# Patient Record
Sex: FEMALE | Race: ASIAN | NOT HISPANIC OR LATINO | Employment: OTHER | URBAN - METROPOLITAN AREA
[De-identification: names, ages, dates, MRNs, and addresses within clinical notes are randomized per-mention and may not be internally consistent; named-entity substitution may affect disease eponyms.]

---

## 2017-01-17 ENCOUNTER — GENERIC CONVERSION - ENCOUNTER (OUTPATIENT)
Dept: OTHER | Facility: OTHER | Age: 77
End: 2017-01-17

## 2017-03-22 DIAGNOSIS — Z12.31 ENCOUNTER FOR SCREENING MAMMOGRAM FOR MALIGNANT NEOPLASM OF BREAST: ICD-10-CM

## 2017-04-18 ENCOUNTER — GENERIC CONVERSION - ENCOUNTER (OUTPATIENT)
Dept: OTHER | Facility: OTHER | Age: 77
End: 2017-04-18

## 2017-04-18 ENCOUNTER — HOSPITAL ENCOUNTER (OUTPATIENT)
Dept: RADIOLOGY | Age: 77
Discharge: HOME | End: 2017-04-18
Payer: COMMERCIAL

## 2017-04-18 DIAGNOSIS — Z12.31 ENCOUNTER FOR SCREENING MAMMOGRAM FOR MALIGNANT NEOPLASM OF BREAST: ICD-10-CM

## 2017-04-18 PROCEDURE — G0202 SCR MAMMO BI INCL CAD: HCPCS

## 2017-05-26 ENCOUNTER — ALLSCRIPTS OFFICE VISIT (OUTPATIENT)
Dept: OTHER | Facility: OTHER | Age: 77
End: 2017-05-26

## 2017-08-02 ENCOUNTER — APPOINTMENT (OUTPATIENT)
Dept: LAB | Facility: CLINIC | Age: 77
End: 2017-08-02
Payer: COMMERCIAL

## 2017-08-02 DIAGNOSIS — Z00.00 ENCOUNTER FOR GENERAL ADULT MEDICAL EXAMINATION WITHOUT ABNORMAL FINDINGS: ICD-10-CM

## 2017-08-02 DIAGNOSIS — I10 ESSENTIAL (PRIMARY) HYPERTENSION: ICD-10-CM

## 2017-08-02 LAB
ALBUMIN SERPL BCP-MCNC: 3.7 G/DL (ref 3.5–5)
ALP SERPL-CCNC: 89 U/L (ref 46–116)
ALT SERPL W P-5'-P-CCNC: 47 U/L (ref 12–78)
ANION GAP SERPL CALCULATED.3IONS-SCNC: 6 MMOL/L (ref 4–13)
AST SERPL W P-5'-P-CCNC: 29 U/L (ref 5–45)
BILIRUB SERPL-MCNC: 0.5 MG/DL (ref 0.2–1)
BUN SERPL-MCNC: 17 MG/DL (ref 5–25)
CALCIUM SERPL-MCNC: 8.4 MG/DL (ref 8.3–10.1)
CHLORIDE SERPL-SCNC: 104 MMOL/L (ref 100–108)
CHOLEST SERPL-MCNC: 184 MG/DL (ref 50–200)
CO2 SERPL-SCNC: 31 MMOL/L (ref 21–32)
CREAT SERPL-MCNC: 0.58 MG/DL (ref 0.6–1.3)
GFR SERPL CREATININE-BSD FRML MDRD: 89 ML/MIN/1.73SQ M
GLUCOSE P FAST SERPL-MCNC: 93 MG/DL (ref 65–99)
HDLC SERPL-MCNC: 96 MG/DL (ref 40–60)
LDLC SERPL CALC-MCNC: 72 MG/DL (ref 0–100)
POTASSIUM SERPL-SCNC: 3.7 MMOL/L (ref 3.5–5.3)
PROT SERPL-MCNC: 7.8 G/DL (ref 6.4–8.2)
SODIUM SERPL-SCNC: 141 MMOL/L (ref 136–145)
TRIGL SERPL-MCNC: 79 MG/DL

## 2017-08-02 PROCEDURE — 80061 LIPID PANEL: CPT

## 2017-08-02 PROCEDURE — 80053 COMPREHEN METABOLIC PANEL: CPT

## 2017-08-02 PROCEDURE — 36415 COLL VENOUS BLD VENIPUNCTURE: CPT

## 2017-08-04 ENCOUNTER — ALLSCRIPTS OFFICE VISIT (OUTPATIENT)
Dept: OTHER | Facility: OTHER | Age: 77
End: 2017-08-04

## 2017-08-18 ENCOUNTER — ALLSCRIPTS OFFICE VISIT (OUTPATIENT)
Dept: OTHER | Facility: OTHER | Age: 77
End: 2017-08-18

## 2017-10-17 ENCOUNTER — GENERIC CONVERSION - ENCOUNTER (OUTPATIENT)
Dept: OTHER | Facility: OTHER | Age: 77
End: 2017-10-17

## 2017-10-31 ENCOUNTER — GENERIC CONVERSION - ENCOUNTER (OUTPATIENT)
Dept: OTHER | Facility: OTHER | Age: 77
End: 2017-10-31

## 2018-01-10 NOTE — RESULT NOTES
Message   notify the patient mammogram no evidence of malignancy/negative, see OB/GYN for routine examination and follow-up as scheduled        Verified Results  * MAMMO SCREENING BILATERAL W CAD 12Apr2016 10:53AM Anisha Givens     Test Name Result Flag Reference   MAMMO SCREENING BILATERAL W CAD (Report)     Patient History:   Patient is postmenopausal, has history of colorectal cancer at    age 45, and had first child at age 43  Family history of breast cancer in sister at age 61 and    colorectal cancer in sister at age 79  Took estrogen for 20 years  Patient has never smoked  Patient's BMI is 23 4  Reason for exam: screening (asymptomatic)  Mammo Screening Bilateral W CAD: April 12, 2016 - Check In #:    [de-identified]   Bilateral CC and MLO view(s) were taken  Technologist: RT Xiao(R)(M)   Prior study comparison: April 8, 2015, bilateral digital    screening mammogram, performed at [place] [address]  April 2, 2014, digital bilateral screening mammogram    performed at [address] [place] [address]  March 28, 2013,    digital bilateral screening mammogram performed at [facility]  March 27, 2012, digital bilateral screening    mammogram performed at [facility]  March 22, 2011, digital bilateral screening mammogram performed at [address] [place] [address]  There are scattered fibroglandular densities  No new dominant    soft tissue mass, architectural distortion or suspicious    calcifications are noted  The skin and nipple contours are    within normal limits  No evidence of malignancy  No significant changes   when compared with prior studies  ASSESSMENT: BiRad:1 - Negative     Recommendation:   Routine screening mammogram of both breasts in 1 year  Analyzed by CAD     8-10% of cancers will be missed on mammography  Management of a    palpable abnormality must be based on clinical grounds   Patients will be notified of their results via letter from our facility  Accredited by [employer] of [employer] and FDA  Transcription Location: DEVI Jenkins 98: [id number]     Risk Value(s):   Tyrer-Cuzick 10 Year: 5 764%, Tyrer-Cuzick Lifetime: 5 764%,    Myriad Table: 1 5%, BRITTNEE 5 Year: 2 6%, NCI Lifetime: 5 9%   Signed by:   Radha Nesbitt MD   4/12/16       Signatures   Electronically signed by : Kadie Serrato DO;  Apr 12 2016  3:05PM EST                       (Author)

## 2018-01-11 NOTE — PROGRESS NOTES
Chief Complaint  The patient arrived for a blood pressure check  the patient did bring her BP log with her to her appointment  The blood pressure was 142/84, pulse 64  I reviewed the blood pressure and the log with Dr Galen Jaramillo and he requested the patient continue to monitor her BP and continue to modify her lifestyle  Active Problems    1  Abnormal blood chemistry (790 6) (R79 9)   2  Achrochordon (701 9) (L91 8)   3  Acute bronchitis (466 0) (J20 9)   4  Acute pharyngitis (462) (J02 9)   5  Acute upper respiratory infection (465 9) (J06 9)   6  Benign essential hypertension (401 1) (I10)   7  Diarrhea (787 91) (R19 7)   8  Encounter for screening for osteoporosis (V82 81) (Z13 820)   9  Encounter for screening mammogram for malignant neoplasm of breast (V76 12)   (Z12 31)   10  Hypokalemia (276 8) (E87 6)   11  Keratosis, seborrheic (702 19) (L82 1)   12  Lower back pain (724 2) (M54 5)   13  Medicare annual wellness visit, subsequent (V70 0) (Z00 00)   14  Need for chickenpox vaccination (V05 4) (Z23)   15  Need for vaccine for TD (tetanus-diphtheria) (V06 5) (Z23)   16  Screening for genitourinary condition (V81 6) (Z13 89)   17  Screening for neurological condition (V80 09) (Z13 89)   18  Sinus bradycardia (427 89) (R00 1)   19  Skin tag (701 9) (L91 8)   20  Urinary incontinence (788 30) (R32)   21  Vitamin A deficiency (264 9) (E50 9)   22  Vitamin D deficiency (268 9) (E55 9)    Current Meds   1  Calcium 500-125 MG-UNIT Oral Tablet; TAKE 1 TABLET DAILY; Therapy: 68VLC6887 to Recorded   2  Glucosamine CAPS; Patient takes 1200 mg- 1500 mg daily; Therapy: (Recorded:95Ryh0094) to Recorded   3  Kelp 150 MCG Oral Tablet; Therapy: (Cy Jury) to Recorded   4  Omega-3 Fish Oil 1200 MG Oral Capsule; Take 1 times daily; Therapy: (Recorded:20Rig7834) to Recorded   5  One-Daily Multi Vitamins TABS; TAKE 1 TABLET DAILY; Therapy: (Recorded:64Xut4340) to Recorded   6   Probiotic CAPS; USE AS DIRECTED; Therapy: (Recorded:77Yco7286) to Recorded   7  Restasis 0 05 % Ophthalmic Emulsion; INSTILL 1 DROP IN [facility] EYE TWICE DAILY; Therapy: 40YMD5585 to  Requested for: 81Sso2010 Recorded   8  Vitamin C 500 MG Oral Capsule; TAKE 1 CAPSULE DAILY; Therapy: (Recorded:69Vln3767) to Recorded   9  Vitamin C 500 MG Oral Capsule; TAKE 1 CAPSULE DAILY; Therapy: 68DTR0108 to Recorded   10  Vitamin D3 CAPS; Patient takes 2700 IU in the winter and 2300IU in the spring/summer; Therapy: (Recorded:89Mtd7256) to Recorded    Allergies    1  No Known Drug Allergies    2  No Known Environmental Allergies   3   No Known Food Allergies    Future Appointments    Date/Time Provider Specialty Site   08/10/2018 10:30 AM Champ Adams DO [facility]     Signatures   Electronically signed by : Bernie Singer DO; Aug 20 2017  9:39AM EST                       (Author)

## 2018-01-11 NOTE — PROGRESS NOTES
Chief Complaint  The patient arrived for a blood pressure check, she had her BP log with her  Initial BP was 142/64 Pulse 64  Active Problems    1  Abnormal blood chemistry (790 6) (R79 9)   2  Achrochordon (701 9) (L91 8)   3  Acute bronchitis (466 0) (J20 9)   4  Acute pharyngitis (462) (J02 9)   5  Acute upper respiratory infection (465 9) (J06 9)   6  Benign essential hypertension (401 1) (I10)   7  Diarrhea (787 91) (R19 7)   8  Encounter for screening for osteoporosis (V82 81) (Z13 820)   9  Encounter for screening mammogram for malignant neoplasm of breast (V76 12)   (Z12 31)   10  Hypokalemia (276 8) (E87 6)   11  Keratosis, seborrheic (702 19) (L82 1)   12  Lower back pain (724 2) (M54 5)   13  Medicare annual wellness visit, subsequent (V70 0) (Z00 00)   14  Need for chickenpox vaccination (V05 4) (Z23)   15  Need for vaccine for TD (tetanus-diphtheria) (V06 5) (Z23)   16  Screening for genitourinary condition (V81 6) (Z13 89)   17  Screening for neurological condition (V80 09) (Z13 89)   18  Sinus bradycardia (427 89) (R00 1)   19  Skin tag (701 9) (L91 8)   20  Urinary incontinence (788 30) (R32)   21  Vitamin A deficiency (264 9) (E50 9)   22  Vitamin D deficiency (268 9) (E55 9)    Current Meds   1  Calcium 500-125 MG-UNIT Oral Tablet; TAKE 1 TABLET DAILY; Therapy: 12ILK3527 to Recorded   2  Glucosamine CAPS; Patient takes 1200 mg- 1500 mg daily; Therapy: (Recorded:06Sxg7667) to Recorded   3  Kelp 150 MCG Oral Tablet; Therapy: (Alcus [place]) to Recorded   4  Omega-3 Fish Oil 1200 MG Oral Capsule; Take 1 times daily; Therapy: (Recorded:88Pzm7651) to Recorded   5  One-Daily Multi Vitamins TABS; TAKE 1 TABLET DAILY; Therapy: (Recorded:05Wag2647) to Recorded   6  Probiotic CAPS; USE AS DIRECTED; Therapy: (Recorded:14Jhc4281) to Recorded   7  Restasis 0 05 % Ophthalmic Emulsion; INSTILL 1 DROP IN [facility] EYE TWICE DAILY;    Therapy: 76URA2047 to  Requested for: 25Ukx8480 Recorded 8  Vitamin C 500 MG Oral Capsule; TAKE 1 CAPSULE DAILY; Therapy: (Recorded:95Jgm3338) to Recorded   9  Vitamin C 500 MG Oral Capsule; TAKE 1 CAPSULE DAILY; Therapy: 16ESL1027 to Recorded   10  Vitamin D3 CAPS; Patient takes 2700 IU in the winter and 2300IU in the spring/summer; Therapy: (Recorded:89Vrx5357) to Recorded    Allergies    1  No Known Drug Allergies    2  No Known Environmental Allergies   3   No Known Food Allergies    Future Appointments    Date/Time Provider Specialty Site   08/10/2018 10:30 AM Glynn Phillips DO [facility]     Signatures   Electronically signed by : Christiane Borrero DO; Aug 20 2017  9:37AM EST                       (Author)

## 2018-01-11 NOTE — RESULT NOTES
Message   Notify the patient the Holter monitor did show rare PACs  follow up as scheduled to discuss        Verified Results  HOLTER MONITOR - 24 HOUR [id number] 12:49PM Kalyan De Leon Order Number: [id number]    - Patient Instructions: To schedule this appointment, please contact Central Scheduling at [phone]  For Jose Partida, please call [phone]   Order Number: [id number]    - Patient Instructions: To schedule this appointment, please contact Central Scheduling at [phone]  For Jose Partida, please call [phone]  Test Name Result Flag Reference   HOLTER MONITOR - 24 HOUR (Report)     PATIENT NAME: Husam Walsh      AGE: 66 y o  Sex: female   MRN: [id number]       PROCEDURE: Holter monitor - 24 hour        INDICATIONS: bradycardia     HOLTER FINDINGS:     The patient was monitored for 24 continuous hours  This revealed the patient to be in normal sinus rhythm with an average rate of 59 BPM; a minimum rate of 44 BPM; and a maximum rate of 93 BPM       There were a total of 0 premature ventricular contractions  There were a total of 51 premature atrial contractions  There was no evidence of atrial fibrillation or atrial flutter  There was no evidence of heart block, and no significant pauses were    seen  No symptoms were reported  1  The patient was in Sinus rhythm throughout the duration of the study  2  The patients average heart rate was 59 bpm     3  Rare PACs   4  No arrhythmia were noted  5  No symptoms reported         Signatures   Electronically signed by : Km Gutierrez DO; Aug 18 2016  7:36PM EST                       (Author)

## 2018-01-12 NOTE — RESULT NOTES
Message   Notify the patient the DEXA scan does show osteopenia, compared to the prior study no significant change follow up as scheduled to discuss        Verified Results  * DXA BONE DENSITY [address] [id number] 02:31PM Darcie Cortez Order Number: [id number]    - Patient Instructions: To schedule this appointment, please contact Central Scheduling at [phone]   Order Number: [id number]    - Patient Instructions: To schedule this appointment, please contact Central Scheduling at [phone]  Test Name Result Flag Reference   DXA BONE DENSITY SPINE HIP AND PELVIS (Report)     CENTRAL DXA SCAN     CLINICAL HISTORY:  66year old postmenopausal  female with history of degenerative arthritis, colon cancer and kidney stones  The patient does not exercise and takes calcium and vitamin D supplements  TECHNIQUE: Bone densitometry was performed using a Hologic Horizon A bone densitometer  Regions of interest appear properly placed  There are degenerative changes of the lumbar spine, which can artificially elevate bone mineral density results  Mild    dextroscoliosis is present  COMPARISON: July 16, 2014 and before     RESULTS:    LUMBAR SPINE: L1-L4:   BMD 0 815 gm/cm2   T-score -2 1   Z-score 0 5     LEFT TOTAL HIP:   BMD 0 812 gm/cm2   T-score -1 1   Z-score 0 9     LEFT FEMORAL NECK:   BMD 0 639 gm/cm2   T-score -1 9   Z-score 0 3             IMPRESSION:   1  Based on the [facility] classification, the T-scores of -2 1, -1 9 and -1 1 in the lumbar spine, left femoral neck and total left hip are consistent with low bone mineral density  2  Since the prior study, there has been no significant change in BMD in the lumbar spine or left hip  3  The 10 year risk of hip fracture is 3 6 %, with the 10 year risk of major osteoporotic fracture being 14 %, as calculated by the WHO fracture risk assessment tool (FRAX)   The current NOF guidelines recommend treating patients with FRAX 10 year risk   score of >3% for hip fracture and >20% for major osteoporotic fracture  4  Any secondary causes of low bone mineral density should be excluded prior to treatment, if clinically indicated  5  A daily intake of at least 1200 mg calcium and 800 to 1000 IU of Vitamin D, as well as weight bearing and muscle strengthening exercise, fall prevention and avoidance of tobacco and excessive alcohol intake as basic preventive measures are suggested  6  Repeat DXA scan in 18-24 months as clinically indicated  WHO CLASSIFICATION:   Normal (a T-score of -1 0 or higher)   Low bone mineral density (a T-score of less than -1 0 but higher than -2 5)   Osteoporosis (a T-score of -2 5 or less)   Severe osteoporosis (a T-score of -2 5 or less with a fragility fracture)             Workstation performed: [id number]     Signed by:   Juli Farrell MD   8/17/16       Plan  Medicare annual wellness visit, subsequent    · * Dexa Scan Axial Skel (Hip, Pelvis, Spine) ; every 2 years; Last 61UOS7597;  Next  73EIC6632; Status:Active    Signatures   Electronically signed by : Daniel Ospina DO; Aug 17 2016 10:27PM EST                       (Author)

## 2018-01-13 VITALS
HEIGHT: 59 IN | BODY MASS INDEX: 24.2 KG/M2 | OXYGEN SATURATION: 98 % | SYSTOLIC BLOOD PRESSURE: 144 MMHG | RESPIRATION RATE: 16 BRPM | HEART RATE: 58 BPM | WEIGHT: 120.04 LBS | DIASTOLIC BLOOD PRESSURE: 100 MMHG

## 2018-01-13 VITALS
TEMPERATURE: 98.7 F | HEIGHT: 59 IN | OXYGEN SATURATION: 96 % | HEART RATE: 57 BPM | DIASTOLIC BLOOD PRESSURE: 92 MMHG | RESPIRATION RATE: 16 BRPM | SYSTOLIC BLOOD PRESSURE: 133 MMHG

## 2018-01-13 NOTE — PROGRESS NOTES
Assessment    1  Medicare annual wellness visit, subsequent (V70 0) (Z00 00)   2  Former smoker (V15 82) (K79 269)   3  Skin tag (701 9) (L91 8)   4  Keratosis, seborrheic (702 19) (L82 1)   5  Benign essential hypertension (401 1) (I10)    Assessment and plan #1 Medicare annual wellness examination completed for the patient today-We'll be ordering patient's comprehensive metabolic panel, lipid panel and hemoglobin A1c  She is up-to-date on her mammogram and colonoscopy  Today she will receive the Prevnar 13  #2 skin tag, seborrheic keratosis I will have the patient see plastic surgeon Dr Mercedes Stewart #3 benign essential hypertension suboptimal control  The patient start monitoring her blood pressure on twice stay, I would like the patient to keep a log and come back for a blood pressure check in 2 weeks; the patient reports me that her home blood pressures are running fine  RTO one year, blood pressure check 2 weeks call if any problems  Plan  Abnormal blood chemistry, Benign essential hypertension    · (1) HEMOGLOBIN A1C; Status:Active; Requested MYU:[id number]; Benign essential hypertension    · (1) COMPREHENSIVE METABOLIC PANEL; Status:Active; Requested FUD:[id number];    · (1) LIPID PANEL, FASTING; Status:Active; Requested RSE:[id number];   Keratosis, seborrheic    · 1 - Britton Butterfield MD, Arabella Elena  (Plastic And Reconstructive Surgery) Co-Management  *   Status: Active  Requested for: 60SPA7032  Care Summary provided  : Yes  Screening for genitourinary condition    · *VB - Urinary Incontinence Screen (Dx Z13 89 Screen for UI);  Status:Complete;   Done:  43CFI0158 12:00AM    prevnar 13; bp check 2 weeks     Chief Complaint  Patient is here for a Medicare Wellness Exam       History of Present Illness  HPI: bp 120/80-90 at home; the pt reports increased leaking urine patient does have a seborrheic keratosis of the right shoulder region a irritated skin tag of the right neck and then a seborrheic keratosis Ã2 of the left flank   Welcome to Medicare and Wellness Visits: The patient is being seen for the subsequent annual wellness visit  Medicare Screening and Risk Factors   Hospitalizations: no previous hospitalizations  Medicare Screening Tests Risk Questions   Abdominal aortic aneurysm risk assessment: none indicated  Osteoporosis risk assessment:  descent  Drug and Alcohol Use: The patient is a former cigarette smoker and quit smoking 1976  The patient reports never drinking alcohol  She has never used illicit drugs  Diet and Physical Activity: Current diet includes well balanced meals, 2-3 servings of fruit per day, 3-4 servings of vegetables per day, 1 servings of meat per day, 1 servings of whole grains per day, 1 servings of dairy products per day and 3 cups of coffee per day  She exercises infrequently  Exercise: walking  (the pt is a grazer)   Mood Disorder and Cognitive Impairment Screening: She denies feeling down, depressed, or hopeless over the past two weeks  She denies feeling little interest or pleasure in doing things over the past two weeks  Cognitive impairment screening: denies difficulty learning/retaining new information, denies difficulty handling complex tasks, denies difficulty with reasoning, denies difficulty with spatial ability and orientation, denies difficulty with language and denies difficulty with behavior  Functional Ability/Level of Safety: Hearing is normal bilaterally  She does not use a hearing aid  Able to do activities of daily living without limitations:  Able to participate in social activities without limitations  Able to drive without limitations  Activities of daily living details: does not need help using the phone, no transportation help needed, does not need help shopping, no meal preparation help needed, does not need help doing housework, does not need help doing laundry, does not need help managing medications and does not need help managing money  Advance Directives: Advance directives: living will, but no durable power of  for health care directives and no advance directives  Co-Managers and Medical Equipment/Suppliers: See Patient Care Team   Preventive Quality Program 65 and Older: Falls Risk: The patient fell 0 times in the past 12 months  Symptoms Include: The patient is currently experiencing fall symptoms  Patient Care Team    Care Team Member Role Specialty Office Number   Gabriela Knapp DO Specialist Cardiology [phone]   Chiamaya Danny DO Specialist Obstetrics/Gynecology [phone]   Susan Macedo MD Specialist Ophthalmology [phone]   Linda Alejandro MD  Colon and Rectal Surgery [phone]     Active Problems    1  Abnormal blood chemistry (790 6) (R79 9)   2  Achrochordon (701 9) (L91 8)   3  Acute bronchitis (466 0) (J20 9)   4  Acute pharyngitis (462) (J02 9)   5  Acute upper respiratory infection (465 9) (J06 9)   6  Benign essential hypertension (401 1) (I10)   7  Diarrhea (787 91) (R19 7)   8  Encounter for screening for osteoporosis (V82 81) (Z13 820)   9  Encounter for screening mammogram for malignant neoplasm of breast (V76 12)   (Z12 31)   10  Hypokalemia (276 8) (E87 6)   11  Lower back pain (724 2) (M54 5)   12  Medicare annual wellness visit, subsequent (V70 0) (Z00 00)   13  Need for chickenpox vaccination (V05 4) (Z23)   14  Need for vaccine for TD (tetanus-diphtheria) (V06 5) (Z23)   15  Screening for genitourinary condition (V81 6) (Z13 89)   16  Screening for neurological condition (V80 09) (Z13 89)   17  Sinus bradycardia (427 89) (R00 1)   18  Vitamin A deficiency (264 9) (E50 9)   19  Vitamin D deficiency (268 9) (E55 9)    Past Medical History    · Need for chickenpox vaccination (V05 4) (Z23)   · Need for vaccine for TD (tetanus-diphtheria) (V06 5) (Z23)    The active problems and past medical history were reviewed and updated today        Surgical History    · History of  Section   · History of Colon Surgery   · History of Hysterectomy    The surgical history was reviewed and updated today  Family History  Family History    · Family history of Cancer   · Family history of Family Health Status Of Father -    · Family history of Family Health Status Of Mother -     The family history was reviewed and updated today  Social History    · Denied: Drug Use   · Never A Smoker   · Never Drank Alcohol  The social history was reviewed and updated today  The social history was reviewed and is unchanged  Current Meds   1  Calcium 500-125 MG-UNIT Oral Tablet; TAKE 1 TABLET DAILY; Therapy: 39IVA4980 to Recorded   2  Glucosamine CAPS; Patient takes 1200 mg- 1500 mg daily; Therapy: (Recorded:98Qxv5089) to Recorded   3  Kelp 150 MCG Oral Tablet; Therapy: (Danny Ma) to Recorded   4  Omega-3 Fish Oil 1200 MG Oral Capsule; Take 1 times daily; Therapy: (Recorded:19Ueq4039) to Recorded   5  One-Daily Multi Vitamins TABS; TAKE 1 TABLET DAILY; Therapy: (Recorded:33Mea1057) to Recorded   6  Probiotic CAPS; USE AS DIRECTED; Therapy: (Recorded:32Yqw2625) to Recorded   7  Restasis 0 05 % Ophthalmic Emulsion; INSTILL 1 DROP IN [facility] EYE TWICE DAILY; Therapy: 09RYH4561 to  Requested for: 52Daz4203 Recorded   8  Vitamin C 500 MG Oral Capsule; TAKE 1 CAPSULE DAILY; Therapy: (Recorded:19Zxi1381) to Recorded   9  Vitamin C 500 MG Oral Capsule; TAKE 1 CAPSULE DAILY; Therapy: 22SJO6191 to Recorded   10  Vitamin D3 CAPS; Patient takes 2700 IU in the winter and 2300IU in the spring/summer; Therapy: (Recorded:94Ucy8115) to Recorded    The medication list was reviewed and updated today  Allergies    1  No Known Drug Allergies    2  No Known Environmental Allergies   3   No Known Food Allergies    Immunizations   1    Influenza  08-Oct-2016    PPSV  2010    Td/DT  2013    Zoster  2014     Vitals  Signs    Heart Rate: 58  Respiration: 16  Systolic: 615, LUE, Sitting  Diastolic: 585, LUE, Sitting  Height: 4 ft 11 in  Weight: 120 lb 0 6 oz  BMI Calculated: 24 24  BSA Calculated: 1 48  O2 Saturation: 98    Physical Exam  Right neck skin tag, right shoulder seborrheic keratosis     Constitutional   General appearance: No acute distress, well appearing and well nourished  Head and Face   Head and face: Normal     Eyes   Conjunctiva and lids: No swelling, erythema or discharge  Pupils and irises: Equal, round, reactive to light  Ears, Nose, Mouth, and Throat   External inspection of ears and nose: Normal     Otoscopic examination: Tympanic membranes translucent with normal light reflex  Canals patent without erythema  Hearing: Normal     Nasal mucosa, septum, and turbinates: Normal without edema or erythema  Lips, teeth, and gums: Normal, good dentition  Oropharynx: Normal with no erythema, edema, exudate or lesions  Neck   Neck: Supple, symmetric, trachea midline, no masses  Pulmonary   Respiratory effort: No increased work of breathing or signs of respiratory distress  Auscultation of lungs: Clear to auscultation  Cardiovascular   Auscultation of heart: Normal rate and rhythm, normal S1 and S2, no murmurs  Pedal pulses: 2+ bilaterally  Examination of extremities for edema and/or varicosities: Normal     Lymphatic   Palpation of lymph nodes in neck: No lymphadenopathy  Psychiatric   Mood and affect: Normal        Results/Data  PHQ-9 Adult Depression Screening 74Lvn2483 10:55AM User, [facility]     Test Name Result Flag Reference   PHQ-9 Adult Depression Score 0     Over the last two weeks, how often have you been bothered by any of the following problems?   Little interest or pleasure in doing things: Not at all - 0  Feeling down, depressed, or hopeless: Not at all - 0  Trouble falling or staying asleep, or sleeping too much: Not at all - 0  Feeling tired or having little energy: Not at all - 0  Poor appetite or over eating: Not at all - 0  Feeling bad about yourself - or that you are a failure or have let yourself or your family down: Not at all - 0  Trouble concentrating on things, such as reading the newspaper or watching television: Not at all - 0  Moving or speaking so slowly that other people could have noticed  Or the opposite -  being so fidgety or restless that you have been moving around a lot more than usual: Not at all - 0  Thoughts that you would be better off dead, or of hurting yourself in some way: Not at all - 0   PHQ-9 Adult Depression Screening Negative     PHQ-9 Difficulty Level Not difficult at all     PHQ-9 Severity No Depression       Falls Risk Assessment (Dx Z13 89 Screen for Neurologic Disorder) 94FTH7170 10:55AM User, [facility]     Test Name Result Flag Reference   Falls Risk      No falls in the past year     (1) COMPREHENSIVE METABOLIC PANEL 94KON0012 33:84LP Brooklynn Shy GILL Order Number: [id number]     Test Name Result Flag Reference   SODIUM 141 mmol/L  136-145   POTASSIUM 3 7 mmol/L  3 5-5 3   CHLORIDE 104 mmol/L  100-108   CARBON DIOXIDE 31 mmol/L  21-32   ANION GAP (CALC) 6 mmol/L  4-13   BLOOD UREA NITROGEN 17 mg/dL  5-25   CREATININE 0 58 mg/dL L 0 60-1 30   Standardized to IDMS reference method   CALCIUM 8 4 mg/dL  8 3-10 1   BILI, TOTAL 0 50 mg/dL  0 20-1 00   ALK PHOSPHATAS 89 U/L     ALT (SGPT) 47 U/L  12-78   AST(SGOT) 29 U/L  5-45   ALBUMIN 3 7 g/dL  3 5-5 0   TOTAL PROTEIN 7 8 g/dL  6 4-8 2   eGFR 89 ml/min/1 73sq m     National Kidney Disease Education Program recommendations are as follows:  GFR calculation is accurate only with a steady state creatinine  Chronic Kidney disease less than 60 ml/min/1 73 sq  meters  Kidney failure less than 15 ml/min/1 73 sq  meters     GLUCOSE FASTING 93 mg/dL  65-99     (1) LIPID PANEL, FASTING 92Lvh7468 12:07PM Jeremy Hinojosa Order Number: [id number]     Test Name Result Flag Reference CHOLESTEROL 184 mg/dL     HDL,DIRECT 96 mg/dL H 40-60   Specimen collection should occur prior to Metamizole administration due to the potential for falsely depressed results  LDL CHOLESTEROL CALCULATED 72 mg/dL  0-100   - Patient Instructions: This is a fasting blood test  Water,black tea or black  coffee only after 9:00pm the night before test   Drink 2 glasses of water the morning of test       Triglyceride:         Normal              <150 mg/dl       Borderline High    150-199 mg/dl       High               200-499 mg/dl       Very High          >499 mg/dl  Cholesterol:         Desirable        <200 mg/dl      Borderline High  200-239 mg/dl      High             >239 mg/dl  HDL Cholesterol:        High    >59 mg/dL      Low     <41 mg/dL  LDL CALCULATED:    This screening LDL is a calculated result  It does not have the accuracy of the Direct Measured LDL in the monitoring of patients with hyperlipidemia and/or statin therapy  Direct Measure LDL (UEW029) must be ordered separately in these patients  TRIGLYCERIDES 79 mg/dL  <=150   Specimen collection should occur prior to N-Acetylcysteine or Metamizole administration due to the potential for falsely depressed results  * MAMMO SCREENING BILATERAL W CAD 18Apr2017 10:54AM Lyanne Phalen Order Number: [id number]    - Patient Instructions: To schedule this appointment, please contact Central Scheduling at [phone]  Do not wear any perfume, powder, lotion or deodorant on breast or underarm area  Please bring your doctors order, referral (if needed) and insurance information with you on the day of the test  Failure to bring this information may result in this test being rescheduled  Arrive 15 minutes prior to your appointment time to register  On the day of your test, please bring any prior mammogram or breast studies with you that were not performed at a [facility]   Failure to bring prior exams may result in your test needing to be rescheduled  Test Name Result Flag Reference   MAMMO SCREENING BILATERAL W CAD (Report)     Patient History:   Patient is postmenopausal, has history of colorectal cancer at    age 45, and had first child at age 43  Family history of breast cancer at age 61 and colorectal cancer    at age 79 in sister  Took estrogen for 20 years  Patient has never smoked  Patient's BMI is 23 4  Reason for exam: screening, asymptomatic  Mammo Screening Bilateral W CAD: April 18, 2017 - Check In #:    [de-identified]   Bilateral CC and MLO view(s) were taken  Technologist: Ramesh Gipson RT(R)(M)   Prior study comparison: April 12, 2016, mammo screening bilateral   W CAD performed at [address] [place] [address]  April 8, 2015,    bilateral digital screening mammogram, performed at [place] [address]  April 2, 2014, digital bilateral    screening mammogram performed at [address] [place] [address]  March 28, 2013, digital bilateral screening mammogram performed    at [address] [place] [address]  March 27, 2012, digital    bilateral screening mammogram performed at /[facility]  There are scattered fibroglandular densities  The parenchymal pattern appears stable  No dominant soft tissue    mass or suspicious calcifications are noted  The skin and nipple   contours are within normal limits  No mammographic evidence of malignancy  No    significant changes when compared with prior studies  ACR BI-RADSï¾® Assessments: BiRad:1 - Negative     Recommendation:   Routine screening mammogram in 1 year  A reminder letter will be   scheduled  Analyzed by CAD     8-10% of cancers will be missed on mammography  Management of a    palpable abnormality must be based on clinical grounds  Patients   will be notified of their results via letter from our facility  Accredited by [employer] of Radiology and FDA       Transcription Location: [facility]   Signing Station: [id number]     Risk Value(s):   Tyrer-Cuzick 10 Year: 5 100%, Tyrer-Cuzick Lifetime: 5 100%,    Myriad Table: 1 5%, BRITTNEE 5 Year: 3 5%, NCI Lifetime: 6 2%   Signed by:   Germaine Cruz MD   4/18/17     Health Management  Medicare annual wellness visit, subsequent   * Dexa Scan Axial Skel (Hip, Pelvis, Spine); every 2 years; Last 05ABH0225; Next Due:  48SCM9562; Overdue  Health Maintenance   Medicare Annual Wellness Visit; every 1 year; Next Due: 01AKG9776;  Overdue    Future Appointments    Date/Time Provider Specialty Site   08/18/2017 10:30 AM ABHIJIT, Nurse Schedule  [facility]   08/10/2018 10:30 AM Delmi Love DO Internal Medicine [facility]     Signatures   Electronically signed by : Kelton Perdue DO; Aug  6 2017  4:12PM EST                       (Author)

## 2018-01-13 NOTE — PROGRESS NOTES
Assessment    1  Medicare annual wellness visit, subsequent (V70 0) (Z00 00)   2  Sinus bradycardia (427 89) (R00 1)   3  Encounter for screening for osteoporosis (V82 81) (Z13 820)    Assessment and plan #1 annual Medicare wellness examination was completed for the patient overall the patient is clinically stable and doing very well we encouraged her to continue with healthy and balanced diet encourage her to exercise i e  walk 30 minutes daily  I will be ordering the patient's routine laboratories in one year including the comprehensive metabolic panel, lipid panel  #2 the patient has noticed some sinus bradycardia when at home in the 40s and because of this we will check a 24-hour Holter monitor to evaluate this further if she does have significant bradycardia she will need to see cardiology #3 I have also given the patient her order requisition for screening for osteoporosis  Given her old request for the DEXA scan  We encouraged the patient to get a flu vaccine this coming fall she does mention some international travel RTO in one year call if any problems  Plan  Benign essential hypertension, Medicare annual wellness visit, subsequent    · (1) LIPID PANEL, FASTING; Status:Active; Requested for:[id number];   Encounter for screening for osteoporosis    · * DXA BONE DENSITY SPINE HIP AND PELVIS; Status:Hold For - Scheduling;  Requested for:[id number];   Medicare annual wellness visit, subsequent    · (1) COMPREHENSIVE METABOLIC PANEL; Status:Active; Requested for:[id number];   Sinus bradycardia    · HOLTER MONITOR - 24 HOUR; Status:Hold For - Scheduling; Requested  for:[id number];     Discussion/Summary  Impression: Subsequent Annual Wellness Visit  Cardiovascular screening and counseling: counseling was given on maintaining a healthy diet and counseling was given on maintaining a healthy weight     Diabetes screening and counseling: counseling was given on maintaining a healthy diet and counseling was given on maintaining a healthy weight  Colorectal cancer screening and counseling: screening is current  Breast cancer screening and counseling: the risks and benefits of screening were discussed and screening is current  Cervical cancer screening and counseling: screening not indicated  Osteoporosis screening and counseling: the risks and benefits of screening were discussed  Abdominal aortic aneurysm screening and counseling: screening not indicated  HIV screening and counseling: screening not indicated  Chief Complaint  Patient presents to office today for a Medicare Wellness  History of Present Illness  HPI: the pt reports slight urine leaking she is working with ob/gyn   Welcome to [employer] and Wellness Visits: The patient is being seen for the subsequent annual wellness visit  Medicare Screening and Risk Factors   Hospitalizations: no previous hospitalizations  Medicare Screening Tests Risk Questions   Osteoporosis risk assessment: female gender, over 48years of age and 200 dexa  HIV risk assessment: none indicated  Drug and Alcohol Use: The patient has never smoked cigarettes  The patient reports never drinking alcohol  She has never used illicit drugs  Diet and Physical Activity: Current diet includes well balanced meals and low salt food choices  She exercises infrequently  Exercise: very active  Mood Disorder and Cognitive Impairment Screening: She denies feeling down, depressed, or hopeless over the past two weeks  She denies feeling little interest or pleasure in doing things over the past two weeks  Cognitive impairment screening: denies difficulty handling complex tasks, denies difficulty with language and denies difficulty with behavior  Functional Ability/Level of Safety: Hearing is normal bilaterally and a hearing aid is not used   Activities of daily living details: does not need help using the phone, no transportation help needed, does not need help shopping, no meal preparation help needed, does not need help doing housework, does not need help doing laundry, does not need help managing medications and does not need help managing money  Fall risk factors: The patient fell 0 times in the past 12 months  Home safety risk factors:  no unfamiliar surroundings, no loose rugs, no poor household lighting, no uneven floors, no household clutter, grab bars in the bathroom and handrails on the stairs  Advance Directives: Advance directives: living will, durable power of  for health care directives, advance directives and aravind Garay  Co-Managers and Medical Equipment/Suppliers: See Patient Care Team      Patient Care Team    Care Team Member Role Specialty Office Number   Christine Lockwood DO Specialist Cardiology [phone]   Wing Ayers DO Specialist Obstetrics/Gynecology [phone]   Chilango Ortiz MD Specialist Ophthalmology [phone]   Safia Hines MD  Colon and Rectal Surgery [phone]     Active Problems    1  Abnormal blood chemistry (790 6) (R79 9)   2  Achrochordon (701 9) (L91 8)   3  Acute bronchitis (466 0) (J20 9)   4  Acute pharyngitis (462) (J02 9)   5  Acute upper respiratory infection (465 9) (J06 9)   6  Benign essential hypertension (401 1) (I10)   7  Diarrhea (787 91) (R19 7)   8  Encounter for screening mammogram for malignant neoplasm of breast (V76 12)   (Z12 31)   9  Hypokalemia (276 8) (E87 6)   10  Lower back pain (724 2) (M54 5)   11  Medicare annual wellness visit, subsequent (V70 0) (Z00 00)   12  Need for chickenpox vaccination (V05 4) (Z23)   13  Need for vaccine for TD (tetanus-diphtheria) (V06 5) (Z23)   14  Screening for genitourinary condition (V81 6) (Z13 89)   15  Screening for neurological condition (V80 09) (Z13 89)   16  Vitamin A deficiency (264 9) (E50 9)   17   Vitamin D deficiency (268 9) (E55 9)    Past Medical History    · Need for chickenpox vaccination (V05 4) (Z23)   · Need for vaccine for TD (tetanus-diphtheria) (V06 5) (Z23)    The active problems and past medical history were reviewed and updated today  Surgical History    · History of  Section   · History of Colon Surgery   · History of Hysterectomy    The surgical history was reviewed and updated today  Family History  Family History    · Family history of Cancer   · Family history of Family Health Status Of Father -    · Family history of Family Health Status Of Mother -     The family history was reviewed and updated today  Social History    · Denied: Drug Use   · Never A Smoker   · Never Drank Alcohol  The social history was reviewed and updated today  The social history was reviewed and is unchanged  Current Meds   1  Calcium 600 TABS; TAKE 1 TABLET DAILY; Therapy: [phone]) to Recorded   2  Omega-3 Fish Oil 1000 MG Oral Capsule; TAKE 4 CAPSULE Daily; Therapy: [phone]) to Recorded   3  One-Daily Multi Vitamins TABS; TAKE 1 TABLET DAILY; Therapy: [phone]) to Recorded   4  Restasis 0 05 % Ophthalmic Emulsion; INSTILL 3 TIMES DAILY; Therapy: [id number] to ([id number])  Requested for: 20LEF1675 Recorded   5  Tears Again Hydrate 1000-500-40 MG CAPS; Therapy: 88MGP9310 to Recorded   6  Vitamin C 500 MG Oral Capsule; TAKE 1 CAPSULE DAILY; Therapy: [phone]) to Recorded    The medication list was reviewed and updated today  Allergies    1  No Known Drug Allergies    2  No Known Environmental Allergies   3  No Known Food Allergies    Immunizations   1    PPSV  2010    Td/DT  2013    Zoster  2014     Physical Exam    Constitutional   General appearance: No acute distress, well appearing and well nourished  Head and Face   Head and face: Normal     Eyes   Conjunctiva and lids: No swelling, erythema or discharge  Pupils and irises: Equal, round, reactive to light      Ears, Nose, Mouth, and Throat External inspection of ears and nose: Normal     Otoscopic examination: Tympanic membranes translucent with normal light reflex  Canals patent without erythema  Hearing: Normal     Nasal mucosa, septum, and turbinates: Normal without edema or erythema  Lips, teeth, and gums: Normal, good dentition  Oropharynx: Normal with no erythema, edema, exudate or lesions  Neck   Neck: Supple, symmetric, trachea midline, no masses  Pulmonary   Respiratory effort: No increased work of breathing or signs of respiratory distress  Auscultation of lungs: Clear to auscultation  Cardiovascular   Auscultation of heart: Normal rate and rhythm, normal S1 and S2, no murmurs  Pedal pulses: 2+ bilaterally  Examination of extremities for edema and/or varicosities: Normal     Lymphatic   Palpation of lymph nodes in neck: No lymphadenopathy  Psychiatric   Mood and affect: Normal        Health Management  Medicare annual wellness visit, subsequent   * Dexa Scan Axial Skel (Hip, Pelvis, Spine); every 2 years; Last [id number]; Next Due:  30QNF1709; Overdue  Health Maintenance   Medicare Annual Wellness Visit; every 1 year; Next Due: 05LAE5923;  Overdue    Signatures   Electronically signed by : Cherelle Ivey DO; Aug  2 2016 11:56AM EST                       (Author)

## 2018-01-18 NOTE — RESULT NOTES
Message   Notify the patient the mammogram -no mammographic evidence of malignancy, please have patient see OB/GYN for her routine examination and follow up as scheduled        Verified Results  * [facility] SCREENING BILATERAL W CAD 18Apr2017 10:54AM Edmond Teague Order Number: [id number]    - Patient Instructions: To schedule this appointment, please contact Central Scheduling at [phone]  Do not wear any perfume, powder, lotion or deodorant on breast or underarm area  Please bring your doctors order, referral (if needed) and insurance information with you on the day of the test  Failure to bring this information may result in this test being rescheduled  Arrive 15 minutes prior to your appointment time to register  On the day of your test, please bring any prior mammogram or breast studies with you that were not performed at a [facility]  Failure to bring prior exams may result in your test needing to be rescheduled  Test Name Result Flag Reference   MAMMO SCREENING BILATERAL W CAD (Report)     Patient History:   Patient is postmenopausal, has history of colorectal cancer at    age 45, and had first child at age 43  Family history of breast cancer at age 61 and colorectal cancer    at age 79 in sister  Took estrogen for 20 years  Patient has never smoked  Patient's BMI is 23 4  Reason for exam: screening, asymptomatic  Mammo Screening Bilateral W CAD: April 18, 2017 - Check In #:    [de-identified]   Bilateral CC and MLO view(s) were taken  Technologist: RT Miriam(R)(M)   Prior study comparison: April 12, 2016, mammo screening bilateral   W CAD performed at [address] [place] [address]  April 8, 2015,    bilateral digital screening mammogram, performed at [address] [place] [address]  April 2, 2014, digital bilateral    screening mammogram performed at [address] [place] [address]      March 28, 2013, digital bilateral screening mammogram performed at [address]  March 27, 2012, digital    bilateral screening mammogram performed at [address]  There are scattered fibroglandular densities  The parenchymal pattern appears stable  No dominant soft tissue    mass or suspicious calcifications are noted  The skin and nipple   contours are within normal limits  No mammographic evidence of malignancy  No    significant changes when compared with prior studies  ACR BI-RADSï¾® Assessments: BiRad:1 - Negative     Recommendation:   Routine screening mammogram in 1 year  A reminder letter will be   scheduled  Analyzed by CAD     8-10% of cancers will be missed on mammography  Management of a    palpable abnormality must be based on clinical grounds  Patients   will be notified of their results via letter from our facility  Accredited by [employer] of Radiology and FDA  Transcription Location: [facility] 98: [id number]     Risk Value(s):   Tyrer-Cuzick 10 Year: 5 100%, Tyrer-Cuzick Lifetime: 5 100%,    Myriad Table: 1 5%, BRITTNEE 5 Year: 3 5%, NCI Lifetime: 6 2%   Signed by:   Kiara Lara MD   4/18/17       Signatures   Electronically signed by : Cristo Mercado DO;  Apr 18 2017  6:06PM EST                       (Author)

## 2018-03-26 ENCOUNTER — TELEPHONE (OUTPATIENT)
Dept: INTERNAL MEDICINE CLINIC | Facility: CLINIC | Age: 78
End: 2018-03-26

## 2018-03-26 DIAGNOSIS — Z12.31 ENCOUNTER FOR SCREENING MAMMOGRAM FOR BREAST CANCER: Primary | ICD-10-CM

## 2018-04-18 ENCOUNTER — ANNUAL EXAM (OUTPATIENT)
Dept: OBGYN CLINIC | Facility: CLINIC | Age: 78
End: 2018-04-18
Payer: COMMERCIAL

## 2018-04-18 VITALS
DIASTOLIC BLOOD PRESSURE: 94 MMHG | SYSTOLIC BLOOD PRESSURE: 136 MMHG | BODY MASS INDEX: 25.13 KG/M2 | WEIGHT: 128 LBS | HEIGHT: 60 IN

## 2018-04-18 DIAGNOSIS — Z01.419 ENCOUNTER FOR ANNUAL ROUTINE GYNECOLOGICAL EXAMINATION: Primary | ICD-10-CM

## 2018-04-18 PROCEDURE — G0101 CA SCREEN;PELVIC/BREAST EXAM: HCPCS | Performed by: OBSTETRICS & GYNECOLOGY

## 2018-04-18 RX ORDER — KELP 150 MCG
TABLET ORAL
COMMUNITY

## 2018-04-18 RX ORDER — CYCLOSPORINE 0.5 MG/ML
EMULSION OPHTHALMIC
COMMUNITY
Start: 2018-02-21

## 2018-04-18 RX ORDER — PYRIDOXINE HCL (VITAMIN B6) 100 MG
1 TABLET ORAL DAILY
COMMUNITY
Start: 2017-08-04

## 2018-04-18 RX ORDER — BIOTIN 1 MG
TABLET ORAL
COMMUNITY

## 2018-04-18 RX ORDER — FOLIC ACID/MULTIVIT,IRON,MINER 0.4MG-18MG
TABLET ORAL
COMMUNITY

## 2018-04-18 RX ORDER — HYDROCORTISONE ACETATE 0.5 %
CREAM (GRAM) TOPICAL
COMMUNITY

## 2018-04-18 RX ORDER — MULTIVIT-MIN/IRON/FOLIC ACID/K 18-600-40
1 CAPSULE ORAL DAILY
COMMUNITY
End: 2021-06-28

## 2018-04-18 RX ORDER — MULTIVITAMIN
1 TABLET ORAL DAILY
COMMUNITY

## 2018-04-18 NOTE — PROGRESS NOTES
Assessment/Plan:    Pap smear was deferred due to low risk status  Encouraged self-breast examination as well as calcium supplementation  Continue annual mammogram   She is scheduled for screening colonoscopy this month  She will follow up with her family physician as scheduled  Return to office in 2 years per Medicare recommendations/    No problem-specific Assessment & Plan notes found for this encounter  Diagnoses and all orders for this visit:    Encounter for annual routine gynecological examination    Other orders  -     Calcium 500-125 MG-UNIT TABS; Take 1 tablet by mouth daily  -     RESTASIS 0 05 % ophthalmic emulsion;   -     Glucosamine-Chondroit-Vit C-Mn (GLUCOSAMINE 1500 COMPLEX) CAPS; Take by mouth  -     Kelp 150 MCG TABS; Take by mouth  -     Omega-3 Fatty Acids (OMEGA-3 FISH OIL) 1200 MG CAPS; Take by mouth  -     Multiple Vitamin (MULTIVITAMIN) tablet; Take 1 tablet by mouth daily  -     Probiotic Product (PROBIOTIC-10) CAPS; Take by mouth  -     Ascorbic Acid (VITAMIN C) 500 MG CAPS; Take 1 capsule by mouth daily  -     Cholecalciferol (VITAMIN D3) 1000 units CAPS; Take by mouth          Subjective:      Patient ID: Jj Buckner is a 76 y o  female  HPI     This is a 68-year-old female  ( x1) presents for her annual gyn exam   Patient went through iatrogenic menopause at age 34 (TAHBSO due to fibroid uterus)  She was on hormone replacement therapy for 3 years thereafter  Patient denies any vaginal bleeding or spotting  She denies any changes in bowel habits  She has had episodes of urinary incontinence associated with urgency  She prefers to manage this conservatively  She does follow up with her family physician on a regular basis  Overall patient is very healthy and active  She is  x7 years      The following portions of the patient's history were reviewed and updated as appropriate: allergies, current medications, past family history, past medical history, past social history, past surgical history and problem list     Review of Systems   Constitutional: Negative for fatigue, fever and unexpected weight change  Respiratory: Negative for cough, chest tightness, shortness of breath and wheezing  Cardiovascular: Negative  Negative for chest pain and palpitations  Gastrointestinal: Negative  Negative for abdominal distention, abdominal pain, blood in stool, constipation, diarrhea, nausea and vomiting  Genitourinary: Positive for frequency  Negative for difficulty urinating, dyspareunia, dysuria, flank pain, genital sores, hematuria, pelvic pain, urgency, vaginal bleeding, vaginal discharge and vaginal pain  Skin: Negative for rash  Objective:      /94   Ht 5' (1 524 m)   Wt 58 1 kg (128 lb)   Breastfeeding? No   BMI 25 00 kg/m²          Physical Exam   Constitutional: She appears well-developed and well-nourished  Cardiovascular: Normal rate and regular rhythm  Pulmonary/Chest: Effort normal and breath sounds normal    Abdominal: Soft  Bowel sounds are normal  She exhibits no distension  There is no tenderness  There is no rebound and no guarding  Genitourinary: Vagina normal  No vaginal discharge found  Genitourinary Comments: Cervix is surgically absent  The cuff is well-supported  The vagina is noted to be atrophic  No pelvic masses are appreciated    Rectovaginal exam is confirmatory

## 2018-04-23 ENCOUNTER — TRANSCRIBE ORDERS (OUTPATIENT)
Dept: RADIOLOGY | Facility: CLINIC | Age: 78
End: 2018-04-23

## 2018-04-24 ENCOUNTER — HOSPITAL ENCOUNTER (OUTPATIENT)
Dept: RADIOLOGY | Age: 78
Discharge: HOME | End: 2018-04-24
Payer: COMMERCIAL

## 2018-04-24 DIAGNOSIS — Z12.31 ENCOUNTER FOR SCREENING MAMMOGRAM FOR BREAST CANCER: ICD-10-CM

## 2018-04-24 PROCEDURE — 77067 SCR MAMMO BI INCL CAD: CPT

## 2018-08-04 DIAGNOSIS — R79.9 ABNORMAL FINDING OF BLOOD CHEMISTRY: ICD-10-CM

## 2018-08-04 DIAGNOSIS — I10 ESSENTIAL (PRIMARY) HYPERTENSION: ICD-10-CM

## 2018-08-06 ENCOUNTER — APPOINTMENT (OUTPATIENT)
Dept: LAB | Facility: CLINIC | Age: 78
End: 2018-08-06
Payer: COMMERCIAL

## 2018-08-06 DIAGNOSIS — I10 ESSENTIAL (PRIMARY) HYPERTENSION: ICD-10-CM

## 2018-08-06 DIAGNOSIS — I10 HYPERTENSION, UNSPECIFIED TYPE: Primary | ICD-10-CM

## 2018-08-06 DIAGNOSIS — I10 HYPERTENSION, UNSPECIFIED TYPE: ICD-10-CM

## 2018-08-06 DIAGNOSIS — R79.9 ABNORMAL FINDING OF BLOOD CHEMISTRY: ICD-10-CM

## 2018-08-06 LAB
ALBUMIN SERPL BCP-MCNC: 3.5 G/DL (ref 3.5–5)
ALP SERPL-CCNC: 87 U/L (ref 46–116)
ALT SERPL W P-5'-P-CCNC: 54 U/L (ref 12–78)
ANION GAP SERPL CALCULATED.3IONS-SCNC: 7 MMOL/L (ref 4–13)
AST SERPL W P-5'-P-CCNC: 34 U/L (ref 5–45)
BILIRUB SERPL-MCNC: 0.5 MG/DL (ref 0.2–1)
BUN SERPL-MCNC: 15 MG/DL (ref 5–25)
CALCIUM SERPL-MCNC: 8.6 MG/DL (ref 8.3–10.1)
CHLORIDE SERPL-SCNC: 103 MMOL/L (ref 100–108)
CHOLEST SERPL-MCNC: 194 MG/DL (ref 50–200)
CO2 SERPL-SCNC: 30 MMOL/L (ref 21–32)
CREAT SERPL-MCNC: 0.67 MG/DL (ref 0.6–1.3)
EST. AVERAGE GLUCOSE BLD GHB EST-MCNC: 117 MG/DL
GFR SERPL CREATININE-BSD FRML MDRD: 84 ML/MIN/1.73SQ M
GLUCOSE P FAST SERPL-MCNC: 93 MG/DL (ref 65–99)
HBA1C MFR BLD: 5.7 % (ref 4.2–6.3)
HDLC SERPL-MCNC: 103 MG/DL (ref 40–60)
LDLC SERPL CALC-MCNC: 77 MG/DL (ref 0–100)
NONHDLC SERPL-MCNC: 91 MG/DL
POTASSIUM SERPL-SCNC: 3.9 MMOL/L (ref 3.5–5.3)
PROT SERPL-MCNC: 7.4 G/DL (ref 6.4–8.2)
SODIUM SERPL-SCNC: 140 MMOL/L (ref 136–145)
TRIGL SERPL-MCNC: 71 MG/DL

## 2018-08-06 PROCEDURE — 36415 COLL VENOUS BLD VENIPUNCTURE: CPT

## 2018-08-06 PROCEDURE — 80061 LIPID PANEL: CPT

## 2018-08-06 PROCEDURE — 80053 COMPREHEN METABOLIC PANEL: CPT

## 2018-08-06 PROCEDURE — 83036 HEMOGLOBIN GLYCOSYLATED A1C: CPT

## 2018-08-10 ENCOUNTER — OFFICE VISIT (OUTPATIENT)
Dept: INTERNAL MEDICINE CLINIC | Facility: CLINIC | Age: 78
End: 2018-08-10
Payer: COMMERCIAL

## 2018-08-10 VITALS
WEIGHT: 125.2 LBS | RESPIRATION RATE: 16 BRPM | OXYGEN SATURATION: 97 % | BODY MASS INDEX: 24.58 KG/M2 | HEART RATE: 64 BPM | DIASTOLIC BLOOD PRESSURE: 80 MMHG | SYSTOLIC BLOOD PRESSURE: 118 MMHG | HEIGHT: 60 IN

## 2018-08-10 DIAGNOSIS — Z00.00 MEDICARE ANNUAL WELLNESS VISIT, SUBSEQUENT: ICD-10-CM

## 2018-08-10 DIAGNOSIS — R73.03 PREDIABETES: ICD-10-CM

## 2018-08-10 DIAGNOSIS — Z01.00 ENCOUNTER FOR VISION SCREENING: ICD-10-CM

## 2018-08-10 DIAGNOSIS — Z13.6 SCREENING FOR CARDIOVASCULAR CONDITION: ICD-10-CM

## 2018-08-10 DIAGNOSIS — Z13.820 SCREENING FOR OSTEOPOROSIS: Primary | ICD-10-CM

## 2018-08-10 PROCEDURE — G0439 PPPS, SUBSEQ VISIT: HCPCS | Performed by: INTERNAL MEDICINE

## 2018-08-10 PROCEDURE — 3008F BODY MASS INDEX DOCD: CPT | Performed by: INTERNAL MEDICINE

## 2018-08-10 PROCEDURE — 1101F PT FALLS ASSESS-DOCD LE1/YR: CPT | Performed by: INTERNAL MEDICINE

## 2018-08-10 NOTE — PROGRESS NOTES
Assessment/Plan:           Problem List Items Addressed This Visit        Other    Medicare annual wellness visit, subsequent     Assessment and plan 1  Medicare annual wellness examination overall the patient is clinically stable and doing well, we encouraged the patient to follow a healthy and balanced diet  We recommend that the patient exercise routinely approximately 30 minutes 5 times per week   We have reviewed the patient's vaccines and have made recommendations for updates if necessary flu vaccine in the fall I have counseled patient about the new shingles vaccine she may consider it I have counseled patient about the pneumonia shot she may consider it  We will be ordering screening laboratories which are age appropriate  Return to the office in 12 months call if any problems  Other Visit Diagnoses     Screening for osteoporosis    -  Primary    Relevant Orders    DXA bone density spine hip and pelvis    Screening for cardiovascular condition        Encounter for vision screening        Relevant Orders    Ambulatory referral to Ophthalmology    Prediabetes        Relevant Orders    Comprehensive metabolic panel    Hemoglobin A1C          Return to office 6  months  call if any problems  Subjective:      Patient ID: Caroline Sierra is a 76 y o  female      HPI   AWV Clinical     ISAR:   Previous hospitalizations?:  No       Once in a Lifetime Medicare Screening:   EKG performed?:  No    AAA screening performed? (if performed, please add date to Health Maintenance):  No       Medicare Screening Tests and Risk Assessment:   AAA Risk Assessment    Osteoporosis Risk Assessment    HIV Risk Assessment        Drug and Alcohol Use:   Tobacco use    Cigarettes:  former smoker    Quit date:  8/10/1976   Smokeless:  never used smokeless tobacco    Tobacco use duration    Years:  18    Packs per day:  1    Tobacco Cessation Readiness    Alcohol use    Alcohol use:  never    Alcohol Treatment Readiness Illicit Drug Use    Drug use:  never        Diet & Exercise:   Diet   What is your diet?:  No Added Salt, Regular   How many servings a day of the following:   Fruits and Vegetables:  1-2 Meat:  1-2   Whole Grains:  2 Simple Carbs:  2   Dairy:  2 Soda:  0   Coffee:  4 Tea:  0   Exercise    Do you currently exercise?:  currently not exercising       Cognitive Impairment Screening:   Depression screening preformed:  Yes    Depression screening results:  negative for symptoms   Cognitive Impairment Screening    Do you have difficulty learning or retaining new information?:  No Do you have difficulty handling new tasks?:  No   Do you have difficulty with reasoning?:  No Do you have difficulty with spatial ability and orientation?:  No   Do you have difficulty with language?:  No Do you have difficulty with behavior?:  No       Functional Ability/Level of Safety:   Hearing    Hearing difficulties:  No Bilateral:  normal   Hearing Impairment Assessment    Hearing status:  No impairment   Current Activities    Status:  unlimited ADL's   Help needed with the folllowing:    Using the phone:  No Transportation:  No   Shopping:  No Preparing Meals:  No   Doing Housework:  No Doing Laundry:  No   Managing Medications:  No Managing Money:  No   ADL    Fall Risk   Have you fallen in the last 12 months?:  No    Injury History       Home Safety:   Do you have handrails and grab-bars in the home?:  Yes    Do you have working smoke alarms and fire extinguisher?:  Yes Do all household members know how to use them?:  Yes   Home Safety Risk Factors       Advanced Directives:   Advanced Directives    Living Will:  No Durable POA for healthcare:  No   Advanced directive:  No    Patient's End of Life Decisions        Urinary Incontinence:   Do you have urinary incontinence?:  No        Glaucoma:              The following portions of the patient's history were reviewed and updated as appropriate: allergies, current medications, past family history, past medical history, past social history, past surgical history and problem list     Review of Systems      Objective:                    No Follow-up on file  No Known Allergies    History reviewed  No pertinent past medical history  Past Surgical History:   Procedure Laterality Date     SECTION      COLON SURGERY      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    TOTAL ABDOMINAL HYSTERECTOMY W/ BILATERAL SALPINGOOPHORECTOMY      fibroids     Current Outpatient Prescriptions on File Prior to Visit   Medication Sig Dispense Refill    Ascorbic Acid (VITAMIN C) 500 MG CAPS Take 1 capsule by mouth daily      Calcium 500-125 MG-UNIT TABS Take 1 tablet by mouth daily      Cholecalciferol (VITAMIN D3) 1000 units CAPS Take by mouth      Glucosamine-Chondroit-Vit C-Mn (GLUCOSAMINE 1500 COMPLEX) CAPS Take by mouth      Kelp 150 MCG TABS Take by mouth      Multiple Vitamin (MULTIVITAMIN) tablet Take 1 tablet by mouth daily      Omega-3 Fatty Acids (OMEGA-3 FISH OIL) 1200 MG CAPS Take by mouth      Probiotic Product (PROBIOTIC-10) CAPS Take by mouth      RESTASIS 0 05 % ophthalmic emulsion        No current facility-administered medications on file prior to visit  Family History   Problem Relation Age of Onset   Yanique Sicks Cancer Family     Lung cancer Sister     Colon cancer Sister     Breast cancer Sister      Social History     Social History    Marital status:      Spouse name: N/A    Number of children: N/A    Years of education: N/A     Occupational History    Not on file       Social History Main Topics    Smoking status: Former Smoker    Smokeless tobacco: Never Used    Alcohol use No    Drug use: No    Sexual activity: No     Other Topics Concern    Not on file     Social History Narrative    No narrative on file     Vitals:    08/10/18 1023   BP: 118/80   BP Location: Left arm   Patient Position: Sitting   Cuff Size: Standard   Pulse: 64   Resp: 16   SpO2: 97% Weight: 56 8 kg (125 lb 3 2 oz)   Height: 5' (1 524 m)     Results for orders placed or performed in visit on 08/06/18   Hemoglobin A1c   Result Value Ref Range    Hemoglobin A1C 5 7 4 2 - 6 3 %     mg/dl   Lipid panel   Result Value Ref Range    Cholesterol 194 50 - 200 mg/dL    Triglycerides 71 <=150 mg/dL    HDL, Direct 103 (H) 40 - 60 mg/dL    LDL Calculated 77 0 - 100 mg/dL    Non-HDL-Chol (CHOL-HDL) 91 mg/dl   Comprehensive metabolic panel   Result Value Ref Range    Sodium 140 136 - 145 mmol/L    Potassium 3 9 3 5 - 5 3 mmol/L    Chloride 103 100 - 108 mmol/L    CO2 30 21 - 32 mmol/L    Anion Gap 7 4 - 13 mmol/L    BUN 15 5 - 25 mg/dL    Creatinine 0 67 0 60 - 1 30 mg/dL    Glucose, Fasting 93 65 - 99 mg/dL    Calcium 8 6 8 3 - 10 1 mg/dL    AST 34 5 - 45 U/L    ALT 54 12 - 78 U/L    Alkaline Phosphatase 87 46 - 116 U/L    Total Protein 7 4 6 4 - 8 2 g/dL    Albumin 3 5 3 5 - 5 0 g/dL    Total Bilirubin 0 50 0 20 - 1 00 mg/dL    eGFR 84 ml/min/1 73sq m     Weight (last 2 days)     Date/Time   Weight    08/10/18 1023  56 8 (125 2)            Body mass index is 24 45 kg/m²  BP      Temp      Pulse     Resp      SpO2        Vitals:    08/10/18 1023   Weight: 56 8 kg (125 lb 3 2 oz)     Vitals:    08/10/18 1023   Weight: 56 8 kg (125 lb 3 2 oz)       /80 (BP Location: Left arm, Patient Position: Sitting, Cuff Size: Standard)   Pulse 64   Resp 16   Ht 5' (1 524 m)   Wt 56 8 kg (125 lb 3 2 oz)   SpO2 97%   BMI 24 45 kg/m²          Physical Exam   Constitutional: She appears well-developed and well-nourished  HENT:   Head: Normocephalic  Mouth/Throat: Oropharynx is clear and moist    Eyes: Conjunctivae are normal  Pupils are equal, round, and reactive to light  Right eye exhibits no discharge  Left eye exhibits no discharge  No scleral icterus  Neck: Neck supple  Cardiovascular: Normal rate, regular rhythm, normal heart sounds and intact distal pulses    Exam reveals no gallop and no friction rub  No murmur heard  Pulmonary/Chest: Breath sounds normal  No respiratory distress  She has no wheezes  She has no rales  Abdominal: Soft  Bowel sounds are normal  She exhibits no distension and no mass  There is no tenderness  There is no rebound and no guarding  Musculoskeletal: She exhibits no edema or deformity  Lymphadenopathy:     She has no cervical adenopathy  Neurological: She is alert   Coordination normal

## 2018-08-12 PROBLEM — Z00.00 MEDICARE ANNUAL WELLNESS VISIT, SUBSEQUENT: Status: ACTIVE | Noted: 2018-08-12

## 2018-08-12 NOTE — ASSESSMENT & PLAN NOTE
Assessment and plan 1  Medicare annual wellness examination overall the patient is clinically stable and doing well, we encouraged the patient to follow a healthy and balanced diet  We recommend that the patient exercise routinely approximately 30 minutes 5 times per week   We have reviewed the patient's vaccines and have made recommendations for updates if necessary flu vaccine in the fall I have counseled patient about the new shingles vaccine she may consider it I have counseled patient about the pneumonia shot she may consider it  We will be ordering screening laboratories which are age appropriate  Return to the office in 12 months call if any problems

## 2018-08-21 ENCOUNTER — HOSPITAL ENCOUNTER (OUTPATIENT)
Dept: RADIOLOGY | Age: 78
Discharge: HOME | End: 2018-08-21
Payer: COMMERCIAL

## 2018-08-21 DIAGNOSIS — Z13.820 SCREENING FOR OSTEOPOROSIS: ICD-10-CM

## 2018-08-21 PROCEDURE — 77080 DXA BONE DENSITY AXIAL: CPT

## 2018-10-31 ENCOUNTER — OFFICE VISIT (OUTPATIENT)
Dept: INTERNAL MEDICINE CLINIC | Facility: CLINIC | Age: 78
End: 2018-10-31
Payer: COMMERCIAL

## 2018-10-31 VITALS
SYSTOLIC BLOOD PRESSURE: 138 MMHG | OXYGEN SATURATION: 98 % | HEIGHT: 60 IN | DIASTOLIC BLOOD PRESSURE: 90 MMHG | HEART RATE: 65 BPM | BODY MASS INDEX: 24.45 KG/M2 | RESPIRATION RATE: 16 BRPM

## 2018-10-31 DIAGNOSIS — M85.80 OSTEOPENIA, UNSPECIFIED LOCATION: Primary | ICD-10-CM

## 2018-10-31 PROCEDURE — 1036F TOBACCO NON-USER: CPT | Performed by: INTERNAL MEDICINE

## 2018-10-31 PROCEDURE — 99213 OFFICE O/P EST LOW 20 MIN: CPT | Performed by: INTERNAL MEDICINE

## 2018-10-31 PROCEDURE — 1160F RVW MEDS BY RX/DR IN RCRD: CPT | Performed by: INTERNAL MEDICINE

## 2018-10-31 PROCEDURE — 4040F PNEUMOC VAC/ADMIN/RCVD: CPT | Performed by: INTERNAL MEDICINE

## 2018-10-31 NOTE — PROGRESS NOTES
Assessment/Plan:           Problem List Items Addressed This Visit        Musculoskeletal and Integument    Osteopenia - Primary     I have reviewed the DEXA scan with the patient I would like the patient to do weight-bearing exercises take calcium plus vitamin-D 500 mg b i d  I will recheck a DEXA scan in two years  Relevant Orders    DXA bone density spine hip and pelvis    Comprehensive metabolic panel    Hemoglobin A1C          Return to office 6  months  call if any problems  Subjective:      Patient ID: Marina Onofre is a [de-identified] y o  female  HPI [de-identified] old female coming in for a follow up office visit regarding osteopenia, prediabetes; The patient reports me compliant taking medications without untoward side effects the  The patient is here to review his medical condition, update me on the medical condition and the patient reports me no hospitalizations and no ER visits  The patient is a 58 home readings have been anywhere between 110-130 range over 60 70 and 80 on average  Patient is here to review the DEXA scan laboratories  The following portions of the patient's history were reviewed and updated as appropriate: allergies, current medications, past family history, past medical history, past social history, past surgical history and problem list     Review of Systems   Constitutional: Negative for activity change, appetite change and unexpected weight change  HENT: Negative for congestion and postnasal drip  Eyes: Negative for visual disturbance  Respiratory: Negative for cough and shortness of breath  Cardiovascular: Negative for chest pain  Gastrointestinal: Negative for abdominal pain, diarrhea, nausea and vomiting  Neurological: Negative for dizziness, light-headedness and headaches  Hematological: Negative for adenopathy           Objective:      /90 (BP Location: Left arm, Patient Position: Sitting, Cuff Size: Standard)   Pulse 65   Resp 16   Ht 5' (1 524 m) SpO2 98%   BMI 24 45 kg/m²                   No Follow-up on file  No Known Allergies    History reviewed  No pertinent past medical history  Past Surgical History:   Procedure Laterality Date     SECTION      COLON SURGERY      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    TOTAL ABDOMINAL HYSTERECTOMY W/ BILATERAL SALPINGOOPHORECTOMY      fibroids     Current Outpatient Prescriptions on File Prior to Visit   Medication Sig Dispense Refill    Ascorbic Acid (VITAMIN C) 500 MG CAPS Take 1 capsule by mouth daily      Calcium 500-125 MG-UNIT TABS Take 1 tablet by mouth daily      Cholecalciferol (VITAMIN D3) 1000 units CAPS Take by mouth      Glucosamine-Chondroit-Vit C-Mn (GLUCOSAMINE 1500 COMPLEX) CAPS Take by mouth      Kelp 150 MCG TABS Take by mouth      Multiple Vitamin (MULTIVITAMIN) tablet Take 1 tablet by mouth daily      Omega-3 Fatty Acids (OMEGA-3 FISH OIL) 1200 MG CAPS Take by mouth      Probiotic Product (PROBIOTIC-10) CAPS Take by mouth      RESTASIS 0 05 % ophthalmic emulsion        No current facility-administered medications on file prior to visit  Family History   Problem Relation Age of Onset   Nargis Urias Cancer Family     Lung cancer Sister     Colon cancer Sister     Breast cancer Sister      Social History     Social History    Marital status:      Spouse name: N/A    Number of children: N/A    Years of education: N/A     Occupational History    Not on file       Social History Main Topics    Smoking status: Former Smoker    Smokeless tobacco: Never Used    Alcohol use No    Drug use: No    Sexual activity: No     Other Topics Concern    Not on file     Social History Narrative    No narrative on file     Vitals:    10/31/18 1437   BP: 138/90   BP Location: Left arm   Patient Position: Sitting   Cuff Size: Standard   Pulse: 65   Resp: 16   SpO2: 98%   Height: 5' (1 524 m)     Results for orders placed or performed in visit on 18   Hemoglobin A1c   Result Value Ref Range    Hemoglobin A1C 5 7 4 2 - 6 3 %     mg/dl   Lipid panel   Result Value Ref Range    Cholesterol 194 50 - 200 mg/dL    Triglycerides 71 <=150 mg/dL    HDL, Direct 103 (H) 40 - 60 mg/dL    LDL Calculated 77 0 - 100 mg/dL    Non-HDL-Chol (CHOL-HDL) 91 mg/dl   Comprehensive metabolic panel   Result Value Ref Range    Sodium 140 136 - 145 mmol/L    Potassium 3 9 3 5 - 5 3 mmol/L    Chloride 103 100 - 108 mmol/L    CO2 30 21 - 32 mmol/L    ANION GAP 7 4 - 13 mmol/L    BUN 15 5 - 25 mg/dL    Creatinine 0 67 0 60 - 1 30 mg/dL    Glucose, Fasting 93 65 - 99 mg/dL    Calcium 8 6 8 3 - 10 1 mg/dL    AST 34 5 - 45 U/L    ALT 54 12 - 78 U/L    Alkaline Phosphatase 87 46 - 116 U/L    Total Protein 7 4 6 4 - 8 2 g/dL    Albumin 3 5 3 5 - 5 0 g/dL    Total Bilirubin 0 50 0 20 - 1 00 mg/dL    eGFR 84 ml/min/1 73sq m     Weight (last 2 days)     None        Body mass index is 24 45 kg/m²  BP      Temp      Pulse     Resp      SpO2        Vitals:     Vitals:          Physical Exam   Constitutional: She appears well-developed and well-nourished  HENT:   Head: Normocephalic  Mouth/Throat: Oropharynx is clear and moist    Eyes: Pupils are equal, round, and reactive to light  Conjunctivae are normal  Right eye exhibits no discharge  Left eye exhibits no discharge  No scleral icterus  Neck: Neck supple  Cardiovascular: Normal rate, regular rhythm, normal heart sounds and intact distal pulses  Exam reveals no gallop and no friction rub  No murmur heard  Pulmonary/Chest: Breath sounds normal  No respiratory distress  She has no wheezes  She has no rales  Abdominal: Soft  Bowel sounds are normal  She exhibits no distension and no mass  There is no tenderness  There is no rebound and no guarding  Musculoskeletal: She exhibits no edema or deformity  Lymphadenopathy:     She has no cervical adenopathy  Neurological: She is alert   Coordination normal

## 2018-11-04 PROBLEM — M85.80 OSTEOPENIA: Status: ACTIVE | Noted: 2018-11-04

## 2018-11-04 NOTE — ASSESSMENT & PLAN NOTE
I have reviewed the DEXA scan with the patient I would like the patient to do weight-bearing exercises take calcium plus vitamin-D 500 mg b i d  I will recheck a DEXA scan in two years

## 2018-11-05 ENCOUNTER — TELEPHONE (OUTPATIENT)
Dept: INTERNAL MEDICINE CLINIC | Facility: CLINIC | Age: 78
End: 2018-11-05

## 2018-11-05 NOTE — TELEPHONE ENCOUNTER
Patient called the answering service over the weekend  Patient would like to start taking vitamin Omega XL and would like to know if she should stop taking her flax seed oil and fish oil      Please advise

## 2018-11-08 ENCOUNTER — TELEPHONE (OUTPATIENT)
Dept: INTERNAL MEDICINE CLINIC | Facility: CLINIC | Age: 78
End: 2018-11-08

## 2019-02-22 ENCOUNTER — APPOINTMENT (OUTPATIENT)
Dept: LAB | Facility: CLINIC | Age: 79
End: 2019-02-22
Payer: COMMERCIAL

## 2019-02-22 DIAGNOSIS — M85.80 OSTEOPENIA, UNSPECIFIED LOCATION: ICD-10-CM

## 2019-02-22 LAB
ALBUMIN SERPL BCP-MCNC: 3.8 G/DL (ref 3.5–5)
ALP SERPL-CCNC: 74 U/L (ref 46–116)
ALT SERPL W P-5'-P-CCNC: 40 U/L (ref 12–78)
ANION GAP SERPL CALCULATED.3IONS-SCNC: 10 MMOL/L (ref 4–13)
AST SERPL W P-5'-P-CCNC: 30 U/L (ref 5–45)
BILIRUB SERPL-MCNC: 0.6 MG/DL (ref 0.2–1)
BUN SERPL-MCNC: 11 MG/DL (ref 5–25)
CALCIUM SERPL-MCNC: 9 MG/DL (ref 8.3–10.1)
CHLORIDE SERPL-SCNC: 104 MMOL/L (ref 100–108)
CO2 SERPL-SCNC: 29 MMOL/L (ref 21–32)
CREAT SERPL-MCNC: 0.62 MG/DL (ref 0.6–1.3)
EST. AVERAGE GLUCOSE BLD GHB EST-MCNC: 117 MG/DL
GFR SERPL CREATININE-BSD FRML MDRD: 85 ML/MIN/1.73SQ M
GLUCOSE P FAST SERPL-MCNC: 84 MG/DL (ref 65–99)
HBA1C MFR BLD: 5.7 % (ref 4.2–6.3)
POTASSIUM SERPL-SCNC: 3.9 MMOL/L (ref 3.5–5.3)
PROT SERPL-MCNC: 7.7 G/DL (ref 6.4–8.2)
SODIUM SERPL-SCNC: 143 MMOL/L (ref 136–145)

## 2019-02-22 PROCEDURE — 80053 COMPREHEN METABOLIC PANEL: CPT

## 2019-02-22 PROCEDURE — 36415 COLL VENOUS BLD VENIPUNCTURE: CPT

## 2019-02-22 PROCEDURE — 83036 HEMOGLOBIN GLYCOSYLATED A1C: CPT

## 2019-04-16 ENCOUNTER — ANNUAL EXAM (OUTPATIENT)
Dept: OBGYN CLINIC | Facility: CLINIC | Age: 79
End: 2019-04-16
Payer: COMMERCIAL

## 2019-04-16 VITALS
WEIGHT: 117.2 LBS | HEIGHT: 60 IN | DIASTOLIC BLOOD PRESSURE: 76 MMHG | SYSTOLIC BLOOD PRESSURE: 110 MMHG | BODY MASS INDEX: 23.01 KG/M2

## 2019-04-16 DIAGNOSIS — Z01.419 ENCOUNTER FOR ANNUAL ROUTINE GYNECOLOGICAL EXAMINATION: Primary | ICD-10-CM

## 2019-04-16 DIAGNOSIS — Z12.39 BREAST CANCER SCREENING: ICD-10-CM

## 2019-04-16 PROCEDURE — G0101 CA SCREEN;PELVIC/BREAST EXAM: HCPCS | Performed by: OBSTETRICS & GYNECOLOGY

## 2019-05-06 ENCOUNTER — OFFICE VISIT (OUTPATIENT)
Dept: INTERNAL MEDICINE CLINIC | Facility: CLINIC | Age: 79
End: 2019-05-06
Payer: COMMERCIAL

## 2019-05-06 VITALS
HEIGHT: 60 IN | DIASTOLIC BLOOD PRESSURE: 72 MMHG | SYSTOLIC BLOOD PRESSURE: 112 MMHG | BODY MASS INDEX: 23.16 KG/M2 | HEART RATE: 53 BPM | WEIGHT: 118 LBS | RESPIRATION RATE: 16 BRPM | OXYGEN SATURATION: 99 %

## 2019-05-06 DIAGNOSIS — N39.498 OTHER URINARY INCONTINENCE: ICD-10-CM

## 2019-05-06 DIAGNOSIS — R73.03 PREDIABETES: ICD-10-CM

## 2019-05-06 DIAGNOSIS — Z13.29 SCREENING FOR HYPOTHYROIDISM: ICD-10-CM

## 2019-05-06 DIAGNOSIS — S09.90XA INJURY OF HEAD, INITIAL ENCOUNTER: ICD-10-CM
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
## 2019-05-06 DIAGNOSIS — Z23 NEED FOR PNEUMOCOCCAL VACCINATION: ICD-10-CM

## 2019-05-06 DIAGNOSIS — H81.12 BENIGN POSITIONAL VERTIGO, LEFT: Primary | ICD-10-CM

## 2019-05-06 DIAGNOSIS — Z13.6 SCREENING FOR CARDIOVASCULAR CONDITION: ICD-10-CM

## 2019-05-06 PROBLEM — E03.9 HYPOTHYROIDISM: Status: ACTIVE | Noted: 2019-05-06

## 2019-05-06 PROBLEM — R32 ABSENCE OF BLADDER CONTINENCE: Status: ACTIVE | Noted: 2019-05-06

## 2019-05-06 PROCEDURE — 90670 PCV13 VACCINE IM: CPT

## 2019-05-06 PROCEDURE — 99214 OFFICE O/P EST MOD 30 MIN: CPT | Performed by: INTERNAL MEDICINE

## 2019-05-06 PROCEDURE — G0009 ADMIN PNEUMOCOCCAL VACCINE: HCPCS

## 2019-05-06 PROCEDURE — 4040F PNEUMOC VAC/ADMIN/RCVD: CPT

## 2019-05-08 ENCOUNTER — EVALUATION (OUTPATIENT)
Dept: PHYSICAL THERAPY | Facility: CLINIC | Age: 79
End: 2019-05-08
Payer: COMMERCIAL

## 2019-05-08 DIAGNOSIS — H81.12 BENIGN PAROXYSMAL VERTIGO OF LEFT EAR: Primary | ICD-10-CM

## 2019-05-08 DIAGNOSIS — H81.12 BENIGN POSITIONAL VERTIGO, LEFT: ICD-10-CM

## 2019-05-08 PROCEDURE — 95992 CANALITH REPOSITIONING PROC: CPT | Performed by: PHYSICAL THERAPIST

## 2019-05-08 PROCEDURE — 97162 PT EVAL MOD COMPLEX 30 MIN: CPT | Performed by: PHYSICAL THERAPIST

## 2019-05-13 ENCOUNTER — OFFICE VISIT (OUTPATIENT)
Dept: PHYSICAL THERAPY | Facility: CLINIC | Age: 79
End: 2019-05-13
Payer: COMMERCIAL

## 2019-05-13 DIAGNOSIS — H81.12 BENIGN PAROXYSMAL VERTIGO OF LEFT EAR: Primary | ICD-10-CM

## 2019-05-13 PROCEDURE — 95992 CANALITH REPOSITIONING PROC: CPT | Performed by: PHYSICAL THERAPIST

## 2019-05-15 ENCOUNTER — HOSPITAL ENCOUNTER (OUTPATIENT)
Dept: RADIOLOGY | Age: 79
Discharge: HOME | End: 2019-05-15
Payer: COMMERCIAL

## 2019-05-15 VITALS — HEIGHT: 60 IN | BODY MASS INDEX: 23.16 KG/M2 | WEIGHT: 118 LBS

## 2019-05-15 DIAGNOSIS — Z12.39 BREAST CANCER SCREENING: ICD-10-CM

## 2019-05-15 PROCEDURE — 77067 SCR MAMMO BI INCL CAD: CPT

## 2019-05-17 ENCOUNTER — TELEPHONE (OUTPATIENT)
Dept: INTERNAL MEDICINE CLINIC | Facility: CLINIC | Age: 79
End: 2019-05-17

## 2019-06-12 ENCOUNTER — EVALUATION (OUTPATIENT)
Dept: PHYSICAL THERAPY | Facility: CLINIC | Age: 79
End: 2019-06-12
Payer: COMMERCIAL

## 2019-06-12 DIAGNOSIS — H81.12 BENIGN PAROXYSMAL VERTIGO OF LEFT EAR: Primary | ICD-10-CM

## 2019-06-12 PROCEDURE — 95992 CANALITH REPOSITIONING PROC: CPT | Performed by: PHYSICAL THERAPIST

## 2019-08-12 ENCOUNTER — OFFICE VISIT (OUTPATIENT)
Dept: INTERNAL MEDICINE CLINIC | Facility: CLINIC | Age: 79
End: 2019-08-12
Payer: COMMERCIAL

## 2019-08-12 VITALS
BODY MASS INDEX: 24.15 KG/M2 | DIASTOLIC BLOOD PRESSURE: 70 MMHG | OXYGEN SATURATION: 98 % | WEIGHT: 123 LBS | RESPIRATION RATE: 16 BRPM | SYSTOLIC BLOOD PRESSURE: 122 MMHG | HEART RATE: 52 BPM | HEIGHT: 60 IN

## 2019-08-12 DIAGNOSIS — Z00.00 MEDICARE ANNUAL WELLNESS VISIT, SUBSEQUENT: ICD-10-CM

## 2019-08-12 DIAGNOSIS — Z13.6 SCREENING FOR CARDIOVASCULAR CONDITION: ICD-10-CM

## 2019-08-12 DIAGNOSIS — H81.12 BENIGN POSITIONAL VERTIGO, LEFT: ICD-10-CM

## 2019-08-12 DIAGNOSIS — S09.90XA INJURY OF HEAD, INITIAL ENCOUNTER: ICD-10-CM

## 2019-08-12 DIAGNOSIS — R73.03 PREDIABETES: Primary | ICD-10-CM

## 2019-08-12 PROCEDURE — 1125F AMNT PAIN NOTED PAIN PRSNT: CPT | Performed by: INTERNAL MEDICINE

## 2019-08-12 PROCEDURE — 1170F FXNL STATUS ASSESSED: CPT | Performed by: INTERNAL MEDICINE

## 2019-08-12 PROCEDURE — G0439 PPPS, SUBSEQ VISIT: HCPCS | Performed by: INTERNAL MEDICINE

## 2019-08-12 PROCEDURE — 99214 OFFICE O/P EST MOD 30 MIN: CPT | Performed by: INTERNAL MEDICINE

## 2019-08-12 NOTE — PROGRESS NOTES
Assessment and Plan:     Problem List Items Addressed This Visit        Other    Medicare annual wellness visit, subsequent     Assessment and plan 1  Medicare subsequent annual wellness examination overall the patient is clinically stable and doing well, we encouraged the patient to follow a healthy and balanced diet  We recommend that the patient exercise routinely approximately 30 minutes 5 times per week   We have reviewed the patient's vaccines and have made recommendations for updates if necessary annual flu vaccine, consider the shingles vaccine at the pharmacy       We will be ordering screening laboratories which are age appropriate  Return to the office in  6 months    call if any problems           Prediabetes - Primary    Relevant Orders    Comprehensive metabolic panel    Hemoglobin A1C         History of Present Illness:     Patient presents for Medicare Annual Wellness visit    Patient Care Team:  Bakari Gonzáles DO as PCP - General Ronit Chacon DO     Problem List:     Patient Active Problem List   Diagnosis    Medicare annual wellness visit, subsequent    Osteopenia    Fall    Benign positional vertigo, left    Head injury    Hypothyroidism    Prediabetes    Screening for cardiovascular condition    Absence of bladder continence      Past Medical and Surgical History:     Past Medical History:   Diagnosis Date    Arthritis     Colon cancer ([facility] [address] ) 12    age 45     Past Surgical History:   Procedure Laterality Date     SECTION      COLON SURGERY      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    TOTAL ABDOMINAL HYSTERECTOMY Bilateral     age 37   [facility] TOTAL ABDOMINAL HYSTERECTOMY W/ BILATERAL SALPINGOOPHORECTOMY Bilateral     fibroids; age 37      Family History:     Family History   Problem Relation Age of Onset    Cancer Family     Lung cancer Sister 80    Colon cancer Sister 79    Breast cancer Sister 61    No Known Problems Daughter     No Known Problems Daughter     No Known Problems Maternal Aunt       Social History:     Social History     Tobacco Use   Smoking Status Former Smoker   Smokeless Tobacco Never Used     Social History     Substance and Sexual Activity   Alcohol Use No     Social History     Substance and Sexual Activity   Drug Use No      Medications and Allergies:     Current Outpatient Medications   Medication Sig Dispense Refill    Ascorbic Acid (VITAMIN C) 500 MG CAPS Take 1 capsule by mouth daily      Calcium 500-125 MG-UNIT TABS Take 1 tablet by mouth daily      Cholecalciferol (VITAMIN D3) 1000 units CAPS Take by mouth      Glucosamine-Chondroit-Vit C-Mn (GLUCOSAMINE 1500 COMPLEX) CAPS Take by mouth      Kelp 150 MCG TABS Take by mouth      Multiple Vitamin (MULTIVITAMIN) tablet Take 1 tablet by mouth daily      Omega-3 Fatty Acids (OMEGA-3 FISH OIL) 1200 MG CAPS Take by mouth      Probiotic Product (PROBIOTIC-10) CAPS Take by mouth      RESTASIS 0 05 % ophthalmic emulsion        No current facility-administered medications for this visit  No Known Allergies   Immunizations:     Immunization History   Administered Date(s) Administered    Influenza Split High Dose Preservative Free IM 10/08/2016, 09/22/2017    Pneumococcal Conjugate 13-Valent 05/06/2019    Pneumococcal Polysaccharide PPV23 07/23/2010    TD (adult) Preservative Free 07/23/2013    Zoster 04/24/2014      Medicare Screening Tests and Risk Assessments:     Ming Aceves is here for her Subsequent Wellness visit  Health Risk Assessment:  Patient rates overall health as good  Patient feels that their physical health rating is Same  Eyesight was rated as Same  Hearing was rated as Same  Patient feels that their emotional and mental health rating is Same  Pain experienced by patient in the last 7 days has been None  Patient states that she has experienced no weight loss or gain in last 6 months       Emotional/Mental Health:  Patient has not been feeling nervous/anxious  PHQ-9 Depression Screening:    Frequency of the following problems over the past two weeks:      1  Little interest or pleasure in doing things: 0 - not at all      2  Feeling down, depressed, or hopeless: 0 - not at all  PHQ-2 Score: 0          Broken Bones/Falls: Fall Risk Assessment:    In the past year, patient has experienced: No history of falling in past year          Bladder/Bowel:  Patient has not leaked urine accidently in the last six months  Patient reports no loss of bowel control  Immunizations:  Patient has had a flu vaccination within the last year  Patient has received a pneumonia shot  Patient has received a shingles shot  Patient has received tetanus/diphtheria shot  Home Safety:  Patient does not have trouble with stairs inside or outside of their home  Patient currently reports that there are no safety hazards present in home, working smoke alarms, working carbon monoxide detectors  Preventative Screenings:   Breast cancer screening performed, colon cancer screen completed, cholesterol screen completed, glaucoma eye exam completed,     Nutrition:  Current diet: Regular with servings of the following:    Medications:  Patient is currently taking over-the-counter supplements  Patient is able to manage medications  Lifestyle Choices:  Patient reports no tobacco use  Patient has not smoked or used tobacco in the past   Patient reports no alcohol use  Patient drives a vehicle  Patient wears seat belt  Activities of Daily Living:  Can get out of bed by his or her self, able to dress self, able to make own meals, able to do own shopping, able to bathe self, can do own laundry/housekeeping, can manage own money, pay bills and track expenses    Previous Hospitalizations:  No hospitalization or ED visit in past 12 months        Advanced Directives:  Patient has decided on a power of   Patient has spoken to designated power of     Patient has completed advanced directive          Preventative Screening/Counseling:      Cardiovascular:      Counseling: Healthy Diet and Healthy Weight          Diabetes:      Counseling: Healthy Diet, Healthy Weight and Improve Physical Activity          Colorectal Cancer:      General: Screening Not Indicated          Breast Cancer:      General: Screening Not Indicated          Cervical Cancer:      General: Screening Not Indicated          AAA:      General: Screening Not Indicated          Glaucoma:      General: Screening Current          HIV:      General: Screening Not Indicated          Hepatitis C:      General: Screening Not Indicated        Advanced Directives:   Patient has living will for healthcare, has durable POA for healthcare,

## 2019-08-12 NOTE — PATIENT INSTRUCTIONS
Obesity   AMBULATORY CARE:   Obesity  is when your body mass index (BMI) is greater than 30  Your healthcare provider will use your height and weight to measure your BMI  The risks of obesity include  many health problems, such as injuries or physical disability  You may need tests to check for the following:  · Diabetes     · High blood pressure or high cholesterol     · Heart disease     · Gallbladder or liver disease     · Cancer of the colon, breast, prostate, liver, or kidney     · Sleep apnea     · Arthritis or gout  Seek care immediately if:   · You have a severe headache, confusion, or difficulty speaking  · You have weakness on one side of your body  · You have chest pain, sweating, or shortness of breath  Contact your healthcare provider if:   · You have symptoms of gallbladder or liver disease, such as pain in your upper abdomen  · You have knee or hip pain and discomfort while walking  · You have symptoms of diabetes, such as intense hunger and thirst, and frequent urination  · You have symptoms of sleep apnea, such as snoring or daytime sleepiness  · You have questions or concerns about your condition or care  Treatment for obesity  focuses on helping you lose weight to improve your health  Even a small decrease in BMI can reduce the risk for many health problems  Your healthcare provider will help you set a weight-loss goal   · Lifestyle changes  are the first step in treating obesity  These include making healthy food choices and getting regular physical activity  Your healthcare provider may suggest a weight-loss program that involves coaching, education, and therapy  · Medicine  may help you lose weight when it is used with a healthy diet and physical activity  · Surgery  can help you lose weight if you are very obese and have other health problems  There are several types of weight-loss surgery  Ask your healthcare provider for more information    Be successful losing weight:   · Set small, realistic goals  An example of a small goal is to walk for 20 minutes 5 days a week  Anther goal is to lose 5% of your body weight  · Tell friends, family members, and coworkers about your goals  and ask for their support  Ask a friend to lose weight with you, or join a weight-loss support group  · Identify foods or triggers that may cause you to overeat , and find ways to avoid them  Remove tempting high-calorie foods from your home and workplace  Place a bowl of fresh fruit on your kitchen counter  If stress causes you to eat, then find other ways to cope with stress  · Keep a diary to track what you eat and drink  Also write down how many minutes of physical activity you do each day  Weigh yourself once a week and record it in your diary  Eating changes: You will need to eat 500 to 1,000 fewer calories each day than you currently eat to lose 1 to 2 pounds a week  The following changes will help you cut calories:  · Eat smaller portions  Use small plates, no larger than 9 inches in diameter  Fill your plate half full of fruits and vegetables  Measure your food using measuring cups until you know what a serving size looks like  · Eat 3 meals and 1 or 2 snacks each day  Plan your meals in advance  Ceci Chicas and eat at home most of the time  Eat slowly  · Eat fruits and vegetables at every meal   They are low in calories and high in fiber, which makes you feel full  Do not add butter, margarine, or cream sauce to vegetables  Use herbs to season steamed vegetables  · Eat less fat and fewer fried foods  Eat more baked or grilled chicken and fish  These protein sources are lower in calories and fat than red meat  Limit fast food  Dress your salads with olive oil and vinegar instead of bottled dressing  · Limit the amount of sugar you eat  Do not drink sugary beverages  Limit alcohol  Activity changes:  Physical activity is good for your body in many ways   It helps you burn calories and build strong muscles  It decreases stress and depression, and improves your mood  It can also help you sleep better  Talk to your healthcare provider before you begin an exercise program   · Exercise for at least 30 minutes 5 days a week  Start slowly  Set aside time each day for physical activity that you enjoy and that is convenient for you  It is best to do both weight training and an activity that increases your heart rate, such as walking, bicycling, or swimming  · Find ways to be more active  Do yard work and housecleaning  Walk up the stairs instead of using elevators  Spend your leisure time going to events that require walking, such as outdoor festivals or fairs  This extra physical activity can help you lose weight and keep it off  Follow up with your healthcare provider as directed: You may need to meet with a dietitian  Write down your questions so you remember to ask them during your visits  © 2017 [address] [employer] Information is for End User's use only and may not be sold, redistributed or otherwise used for commercial purposes  All illustrations and images included in CareNotes® are the copyrighted property of [employer]  or Jaun A Figueroa  The above information is an  only  It is not intended as medical advice for individual conditions or treatments  Talk to your doctor, nurse or pharmacist before following any medical regimen to see if it is safe and effective for you  Urinary Incontinence   WHAT YOU NEED TO KNOW:   What is urinary incontinence? Urinary incontinence (UI) is when you lose control of your bladder  What causes UI? UI occurs because your bladder cannot store or empty urine properly  The following are the most common types of UI:  · Stress incontinence  is when you leak urine due to increased bladder pressure  This may happen when you cough, sneeze, or exercise       · Urge incontinence  is when you feel the need to urinate right away and leak urine accidentally  · Mixed incontinence  is when you have both stress and urge UI  What are the signs and symptoms of UI?   · You feel like your bladder does not empty completely when you urinate  · You urinate often and need to urinate immediately  · You leak urine when you sleep, or you wake up with the urge to urinate  · You leak urine when you cough, sneeze, exercise, or laugh  How is UI diagnosed? Your healthcare provider will ask how often you leak urine and whether you have stress or urge symptoms  Tell him which medicines you take, how often you urinate, and how much liquid you drink each day  You may need any of the following tests:  · Urine tests  may show infection or kidney function  · A pelvic exam  may be done to check for blockages  A pelvic exam will also show if your bladder, uterus, or other organs have moved out of place  · An x-ray, ultrasound, or CT  may show problems with parts of your urinary system  You may be given contrast liquid to help your organs show up better in the pictures  Tell the healthcare provider if you have ever had an allergic reaction to contrast liquid  Do not enter the MRI room with anything metal  Metal can cause serious injury  Tell the healthcare provider if you have any metal in or on your body  · A bladder scan  will show how much urine is left in your bladder after you urinate  You will be asked to urinate and then healthcare providers will use a small ultrasound machine to check the urine left in your bladder  · Cystometry  is used to check the function of your urinary system  Your healthcare provider checks the pressure in your bladder while filling it with fluid  Your bladder pressure may also be tested when your bladder is full and while you urinate  How is UI treated? · Medicines  can help strengthen your bladder control      · Electrical stimulation  is used to send a small amount of electrical energy to your pelvic floor muscles  This helps control your bladder function  Electrodes may be placed outside your body or in your rectum  For women, the electrodes may be placed in the vagina  · A bulking agent  may be injected into the wall of your urethra to make it thicker  This helps keep your urethra closed and decreases urine leakage  · Devices  such as a clamp, pessary, or tampon may help stop urine leaks  Ask your healthcare provider for more information about these and other devices  · Surgery  may be needed if other treatments do not work  Several types of surgery can help improve your bladder control  Ask your healthcare provider for more information about the surgery you may need  How can I manage my symptoms? · Do pelvic muscle exercises often  Your pelvic muscles help you stop urinating  Squeeze these muscles tight for 5 seconds, then relax for 5 seconds  Gradually work up to squeezing for 10 seconds  Do 3 sets of 15 repetitions a day, or as directed  This will help strengthen your pelvic muscles and improve bladder control  · A catheter  may be used to help empty your bladder  A catheter is a tiny, plastic tube that is put into your bladder to drain your urine  Your healthcare provider may tell you to use a catheter to prevent your bladder from getting too full and leaking urine  · Keep a UI record  Write down how often you leak urine and how much you leak  Make a note of what you were doing when you leaked urine  · Train your bladder  Go to the bathroom at set times, such as every 2 hours, even if you do not feel the urge to go  You can also try to hold your urine when you feel the urge to go  For example, hold your urine for 5 minutes when you feel the urge to go  As that becomes easier, hold your urine for 10 minutes  · Drink liquids as directed  Ask your healthcare provider how much liquid to drink each day and which liquids are best for you   You may need to limit the amount of liquid you drink to help control your urine leakage  Limit or do not have drinks that contain caffeine or alcohol  Do not drink any liquid right before you go to bed  · Prevent constipation  Eat a variety of high-fiber foods  Good examples are high-fiber cereals, beans, vegetables, and whole-grain breads  Prune juice may help make your bowel movement softer  Walking is the best way to trigger your intestines to have a bowel movement  · Exercise regularly and maintain a healthy weight  Ask your healthcare provider how much you should weigh and about the best exercise plan for you  Weight loss and exercise will decrease pressure on your bladder and help you control your leakage  Ask him to help you create a weight loss plan if you are overweight  When should I seek immediate care? · You have severe pain  · You are confused or cannot think clearly  When should I contact my healthcare provider? · You have a fever  · You see blood in your urine  · You have pain when you urinate  · You have new or worse pain, even after treatment  · Your mouth feels dry or you have vision changes  · Your urine is cloudy or smells bad  · You have questions or concerns about your condition or care  CARE AGREEMENT:   You have the right to help plan your care  Learn about your health condition and how it may be treated  Discuss treatment options with your caregivers to decide what care you want to receive  You always have the right to refuse treatment  The above information is an  only  It is not intended as medical advice for individual conditions or treatments  Talk to your doctor, nurse or pharmacist before following any medical regimen to see if it is safe and effective for you  © 2017 [address] [employer] Information is for End User's use only and may not be sold, redistributed or otherwise used for commercial purposes   All illustrations and images included in CareNotes® are the copyrighted property of [employer] M [employer] Inc  or Juan A Figueroa  Cigarette Smoking and Your Health   AMBULATORY CARE:   Risks to your health if you smoke:  Nicotine and other chemicals found in tobacco damage every cell in your body  Even if you are a light smoker, you have an increased risk for cancer, heart disease, and lung disease  If you are pregnant or have diabetes, smoking increases your risk for complications  Benefits to your health if you stop smoking:   · You decrease respiratory symptoms such as coughing, wheezing, and shortness of breath  · You reduce your risk for cancers of the lung, mouth, throat, kidney, bladder, pancreas, stomach, and cervix  If you already have cancer, you increase the benefits of chemotherapy  You also reduce your risk for cancer returning or a second cancer from developing  · You reduce your risk for heart disease, blood clots, heart attack, and stroke  · You reduce your risk for lung infections, and diseases such as pneumonia, asthma, chronic bronchitis, and emphysema  · Your circulation improves  More oxygen can be delivered to your body  If you have diabetes, you lower your risk for complications, such as kidney, artery, and eye diseases  You also lower your risk for nerve damage  Nerve damage can lead to amputations, poor vision, and blindness  · You improve your body's ability to heal and to fight infections  Benefits to the health of others if you stop smoking:  Tobacco is harmful to nonsmokers who breathe in your secondhand smoke  The following are ways the health of others around you may improve when you stop smoking:  · You lower the risks for lung cancer and heart disease in nonsmoking adults  · If you are pregnant, you lower the risk for miscarriage, early delivery, low birth weight, and stillbirth  You also lower your baby's risk for SIDS, obesity, developmental delay, and neurobehavioral problems, such as ADHD  · If you have children, you lower their risk for ear infections, colds, pneumonia, bronchitis, and asthma  For more information and support to stop smoking:   · Smokefree  gov  Phone: 4- 099 - 987-0986  Web Address: www smokefree  gov  Follow up with your healthcare provider as directed:  Write down your questions so you remember to ask them during your visits  © 2017 [address] [employer] Information is for End User's use only and may not be sold, redistributed or otherwise used for commercial purposes  All illustrations and images included in CareNotes® are the copyrighted property of [employer] M [employer]  or Juan A Figueroa  The above information is an  only  It is not intended as medical advice for individual conditions or treatments  Talk to your doctor, nurse or pharmacist before following any medical regimen to see if it is safe and effective for you  Fall Prevention   AMBULATORY CARE:   Fall prevention  includes ways to make your home and other areas safer  It also includes ways you can move more carefully to prevent a fall  Health conditions that cause changes in your blood pressure, vision, or muscle strength and coordination may increase your risk for falls  Medicines may also increase your risk for falls if they make you dizzy, weak, or sleepy  Call 911 or have someone else call if:   · You have fallen and are unconscious  · You have fallen and cannot move part of your body  Contact your healthcare provider if:   · You have fallen and have pain or a headache  · You have questions or concerns about your condition or care  Fall prevention tips:   · Stand or sit up slowly  This may help you keep your balance and prevent falls  · Use assistive devices as directed  Your healthcare provider may suggest that you use a cane or walker to help you keep your balance  You may need to have grab bars put in your bathroom near the toilet or in the shower      · Wear shoes that fit well and have soles that   Wear shoes both inside and outside  Use slippers with good   Do not wear shoes with high heels  · Wear a personal alarm  This is a device that allows you to call 911 if you fall and need help  Ask your healthcare provider for more information  · Stay active  Exercise can help strengthen your muscles and improve your balance  Your healthcare provider may recommend water aerobics or walking  He or she may also recommend physical therapy to improve your coordination  Never start an exercise program without talking to your healthcare provider first      · Manage your medical conditions  Keep all appointments with your healthcare providers  Visit your eye doctor as directed  Home safety tips:   · Add items to prevent falls in the bathroom  Put nonslip strips on your bath or shower floor to prevent you from slipping  Use a bath mat if you do not have carpet in the bathroom  This will prevent you from falling when you step out of the bath or shower  Use a shower seat so you do not need to stand while you shower  Sit on the toilet or a chair in your bathroom to dry yourself and put on clothing  This will prevent you from losing your balance from drying or dressing yourself while you are standing  · Keep paths clear  Remove books, shoes, and other objects from walkways and stairs  Place cords for telephones and lamps out of the way so that you do not need to walk over them  Tape them down if you cannot move them  Remove small rugs  If you cannot remove a rug, secure it with double-sided tape  This will prevent you from tripping  · Install bright lights in your home  Use night lights to help light paths to the bathroom or kitchen  Always turn on the light before you start walking  · Keep items you use often on shelves within reach  Do not use a step stool to help you reach an item  · Paint or place reflective tape on the edges of your stairs    This will help you see the stairs better  Follow up with your healthcare provider as directed:  Write down your questions so you remember to ask them during your visits  © 2017 [address] [employer] Information is for End User's use only and may not be sold, redistributed or otherwise used for commercial purposes  All illustrations and images included in CareNotes® are the copyrighted property of [employer]  or Juan A Figueroa  The above information is an  only  It is not intended as medical advice for individual conditions or treatments  Talk to your doctor, nurse or pharmacist before following any medical regimen to see if it is safe and effective for you  Advance Directives   WHAT YOU NEED TO KNOW:   What are advance directives? Advance directives are legal documents that state your wishes and plans for medical care  These plans are made ahead of time in case you lose your ability to make decisions for yourself  Advance directives can apply to any medical decision, such as the treatments you want, and if you want to donate organs  What are the types of advance directives? There are many types of advance directives, and each state has rules about how to use them  You may choose a combination of any of the following:  · Living will: This is a written record of the treatment you want  You can also choose which treatments you do not want, which to limit, and which to stop at a certain time  This includes surgery, medicine, IV fluid, and tube feedings  · Durable power of  for healthcare [facility]): This is a written record that states who you want to make healthcare choices for you when you are unable to make them for yourself  This person, called a proxy, is usually a family member or a friend  You may choose more than 1 proxy  · Do not resuscitate (DNR) order:  A DNR order is used in case your heart stops beating or you stop breathing   It is a request not to have certain forms of treatment, such as CPR  A DNR order may be included in other types of advance directives  · Medical directive: This covers the care that you want if you are in a coma, near death, or unable to make decisions for yourself  You can list the treatments you want for each condition  Treatment may include pain medicine, surgery, blood transfusions, dialysis, IV or tube feedings, and a ventilator (breathing machine)  · Values history: This document has questions about your views, beliefs, and how you feel and think about life  This information can help others choose the care that you would choose  Why are advance directives important? An advance directive helps you control your care  Although spoken wishes may be used, it is better to have your wishes written down  Spoken wishes can be misunderstood, or not followed  Treatments may be given even if you do not want them  An advance directive may make it easier for your family to make difficult choices about your care  How do I decide what to put in my advance directives? · Make informed decisions:  Make sure you fully understand treatments or care you may receive  Think about the benefits and problems your decisions could cause for you or your family  Talk to healthcare providers if you have concerns or questions before you write down your wishes  You may also want to talk with your Scientologist or , or a   Check your state laws to make sure that what you put in your advance directive is legal      · Sign all forms:  Sign and date your advance directive when you have finished  You may also need 2 witnesses to sign the forms  Witnesses cannot be your doctor or his staff, your spouse, heirs or beneficiaries, people you owe money to, or your chosen proxy  Talk to your family, proxy, and healthcare providers about your advance directive  Give each person a copy, and keep one for yourself in a place you can get to easily   Do not keep it hidden or locked away  · Review and revise your plans: You can revise your advance directive at any time, as long as you are able to make decisions  Review your plan every year, and when there are changes in your life, or your health  When you make changes, let your family, proxy, and healthcare providers know  Give each a new copy  Where can I find more information? · [employer]  [address] , [place] [address]  Phone: 5- 479 - [phone]  Phone: 2- 561 - [phone]  Web Address: http://www  aafp org  · [address] [facility])  [address] [address] [facility], [address] [facility] , [address] [place] [address]  Phone: [phone]  Phone: [phone]  Web Address: Evie ayala  CARE AGREEMENT:   You have the right to help plan your care  To help with this plan, you must learn about your health condition and treatment options  You must also learn about advance directives and how they are used  Work with your healthcare providers to decide what care will be used to treat you  You always have the right to refuse treatment  The above information is an  only  It is not intended as medical advice for individual conditions or treatments  Talk to your doctor, nurse or pharmacist before following any medical regimen to see if it is safe and effective for you  © 2017 [address] [employer] Information is for End User's use only and may not be sold, redistributed or otherwise used for commercial purposes  All illustrations and images included in CareNotes® are the copyrighted property of [employer]  or Juan A Figueroa

## 2019-08-13 NOTE — ASSESSMENT & PLAN NOTE
Patient states symptoms have resolved she has declined going for CT scan at this point time she will notify me if any further symptoms

## 2019-08-13 NOTE — PROGRESS NOTES
BMI Counseling: Body mass index is 24 02 kg/m²  Discussed the patient's BMI with her  The BMI is above average  BMI counseling and education was provided to the patient  Nutrition recommendations include reducing portion sizes  Assessment/Plan:    Medicare annual wellness visit, subsequent  Assessment and plan 1  Medicare subsequent annual wellness examination overall the patient is clinically stable and doing well, we encouraged the patient to follow a healthy and balanced diet  We recommend that the patient exercise routinely approximately 30 minutes 5 times per week   We have reviewed the patient's vaccines and have made recommendations for updates if necessary annual flu vaccine, consider the shingles vaccine at the pharmacy       We will be ordering screening laboratories which are age appropriate  Return to the office in  6 months    call if any problems  Benign positional vertigo, left  Clinically improve she has worked with the vestibular therapist and has had significant improvement in her symptoms     Head injury  Patient states symptoms have resolved she has declined going for CT scan at this point time she will notify me if any further symptoms  Prediabetes  Pre diabetes -I have counseled the patient to follow a healthy balanced diet, I have counseled patient reduce carbohydrates and sweets in the diet, I would like the patient exercise routinely  I will be checking hemoglobin A1c and comprehensive metabolic panel  Have counseled patient about the prevention of diabetes, and the risk of progression to type 2 diabetes  Screening for cardiovascular condition  I have counselled the pt to follow a healthy and balanced diet ,and recommend routine exercise           Problem List Items Addressed This Visit        Nervous and Auditory    Benign positional vertigo, left     Clinically improve she has worked with the vestibular therapist and has had significant improvement in her symptoms Other    Medicare annual wellness visit, subsequent     Assessment and plan 1  Medicare subsequent annual wellness examination overall the patient is clinically stable and doing well, we encouraged the patient to follow a healthy and balanced diet  We recommend that the patient exercise routinely approximately 30 minutes 5 times per week   We have reviewed the patient's vaccines and have made recommendations for updates if necessary annual flu vaccine, consider the shingles vaccine at the pharmacy       We will be ordering screening laboratories which are age appropriate  Return to the office in  6 months    call if any problems  Head injury     Patient states symptoms have resolved she has declined going for CT scan at this point time she will notify me if any further symptoms  Prediabetes - Primary     Pre diabetes -I have counseled the patient to follow a healthy balanced diet, I have counseled patient reduce carbohydrates and sweets in the diet, I would like the patient exercise routinely  I will be checking hemoglobin A1c and comprehensive metabolic panel  Have counseled patient about the prevention of diabetes, and the risk of progression to type 2 diabetes  Relevant Orders    Comprehensive metabolic panel    Hemoglobin A1C    Screening for cardiovascular condition     I have counselled the pt to follow a healthy and balanced diet ,and recommend routine exercise  Return to office 6  months  call if any problems  Subjective:      Patient ID: Cande Donald is a [de-identified] y o  female  HPI [de-identified] old female coming in for a follow up office visit regarding prediabetes, benign positional vertigo, head injury, screening for cardiovascular condition; The patient reports me compliant taking medications without untoward side effects the    The patient is here to review his medical condition, update me on the medical condition and the patient reports me no hospitalizations and no ER visits  She reports me vertigo has improved she has seen the vestibular therapist x2 has had improvement in the overall symptoms  She reports me she held off on the CT scan because her symptoms improved  She would like to hold off on the CT scan at this point time  She is trying to follow healthy diet he reports me some weight gain has been going to a lot of parties  The following portions of the patient's history were reviewed and updated as appropriate: allergies, current medications, past family history, past medical history, past social history, past surgical history and problem list     Review of Systems   Constitutional: Negative for activity change, appetite change and unexpected weight change  HENT: Negative for congestion and postnasal drip  Eyes: Negative for visual disturbance  Respiratory: Negative for cough and shortness of breath  Cardiovascular: Negative for chest pain  Gastrointestinal: Negative for abdominal pain, diarrhea, nausea and vomiting  Neurological: Negative for dizziness, light-headedness and headaches  Hematological: Negative for adenopathy  vertigo improved    Objective:    No follow-ups on file  No results found  Recent Results (from the past 96637 hour(s))   Holter monitor - 24 hour    Narrative    PATIENT NAME:  Latoya Powell     AGE:  66 y o  Sex:  female  MRN:  [id number]      PROCEDURE: Holter monitor - 24 hour       INDICATIONS: bradycardia    HOLTER FINDINGS:    The patient was monitored for 24 continuous hours  This revealed the   patient to be in normal sinus rhythm with an average rate of 59 BPM; a   minimum rate of 44 BPM; and a maximum rate of 93 BPM      There were a total of 0 premature ventricular contractions  There were a   total of 51 premature atrial contractions  There was no evidence of   atrial fibrillation or atrial flutter    There was no evidence of heart   block, and no significant pauses were seen  No symptoms were reported  Impression    1  The patient was in Sinus rhythm throughout the duration of the study  2  The patients average heart rate was 59 bpm    3  Rare PACs  4  No arrhythmia were noted  5  No symptoms reported  No Known Allergies    Past Medical History:   Diagnosis Date    Arthritis     Colon cancer ([address] )     age 45     Past Surgical History:   Procedure Laterality Date     SECTION      COLON SURGERY      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    TOTAL ABDOMINAL HYSTERECTOMY Bilateral     age 37   [facility] TOTAL ABDOMINAL HYSTERECTOMY W/ BILATERAL SALPINGOOPHORECTOMY Bilateral     fibroids; age 37     Current Outpatient Medications on File Prior to Visit   Medication Sig Dispense Refill    Ascorbic Acid (VITAMIN C) 500 MG CAPS Take 1 capsule by mouth daily      Calcium 500-125 MG-UNIT TABS Take 1 tablet by mouth daily      Cholecalciferol (VITAMIN D3) 1000 units CAPS Take by mouth      Glucosamine-Chondroit-Vit C-Mn (GLUCOSAMINE 1500 COMPLEX) CAPS Take by mouth      Kelp 150 MCG TABS Take by mouth      Multiple Vitamin (MULTIVITAMIN) tablet Take 1 tablet by mouth daily      Omega-3 Fatty Acids (OMEGA-3 FISH OIL) 1200 MG CAPS Take by mouth      Probiotic Product (PROBIOTIC-10) CAPS Take by mouth      RESTASIS 0 05 % ophthalmic emulsion        No current facility-administered medications on file prior to visit  Family History   Problem Relation Age of Onset    Cancer Family     Lung cancer Sister 80    Colon cancer Sister 79    Breast cancer Sister 61    No Known Problems Daughter     No Known Problems Daughter     No Known Problems Maternal Aunt      Social History     Socioeconomic History    Marital status:       Spouse name: Not on file    Number of children: Not on file    Years of education: Not on file    Highest education level: Not on file   Occupational History    Not on file   Social Needs    Financial resource strain: Not on file    Food insecurity:     Worry: Not on file     Inability: Not on file    Transportation needs:     Medical: Not on file     Non-medical: Not on file   Tobacco Use    Smoking status: Former Smoker    Smokeless tobacco: Never Used   Substance and Sexual Activity    Alcohol use: No    Drug use: No    Sexual activity: Never   Lifestyle    Physical activity:     Days per week: Not on file     Minutes per session: Not on file    Stress: Not on file   Relationships    Social connections:     Talks on phone: Not on file     Gets together: Not on file     Attends Voodoo service: Not on file     Active member of club or organization: Not on file     Attends meetings of clubs or organizations: Not on file     Relationship status: Not on file    Intimate partner violence:     Fear of current or ex partner: Not on file     Emotionally abused: Not on file     Physically abused: Not on file     Forced sexual activity: Not on file   Other Topics Concern    Not on file   Social History Narrative    Not on file     Vitals:    08/12/19 1041   BP: 122/70   Pulse: (!) 52   Resp: 16   SpO2: 98%   Weight: 55 8 kg (123 lb)   Height: 5' (1 524 m)     Results for orders placed or performed in visit on 02/22/19   Comprehensive metabolic panel   Result Value Ref Range    Sodium 143 136 - 145 mmol/L    Potassium 3 9 3 5 - 5 3 mmol/L    Chloride 104 100 - 108 mmol/L    CO2 29 21 - 32 mmol/L    ANION GAP 10 4 - 13 mmol/L    BUN 11 5 - 25 mg/dL    Creatinine 0 62 0 60 - 1 30 mg/dL    Glucose, Fasting 84 65 - 99 mg/dL    Calcium 9 0 8 3 - 10 1 mg/dL    AST 30 5 - 45 U/L    ALT 40 12 - 78 U/L    Alkaline Phosphatase 74 46 - 116 U/L    Total Protein 7 7 6 4 - 8 2 g/dL    Albumin 3 8 3 5 - 5 0 g/dL    Total Bilirubin 0 60 0 20 - 1 00 mg/dL    eGFR 85 ml/min/1 73sq m   Hemoglobin A1C   Result Value Ref Range    Hemoglobin A1C 5 7 4 2 - 6 3 %     mg/dl     Weight (last 2 days)     Date/Time   Weight    08/12/19 1041 55 8 (123)            Body mass index is 24 02 kg/m²  BP      Temp      Pulse    Resp      SpO2        Vitals:    08/12/19 1041   Weight: 55 8 kg (123 lb)     Vitals:    08/12/19 1041   Weight: 55 8 kg (123 lb)       /70   Pulse (!) 52   Resp 16   Ht 5' (1 524 m)   Wt 55 8 kg (123 lb)   SpO2 98%   BMI 24 02 kg/m²          Physical Exam   Constitutional: She appears well-developed and well-nourished  HENT:   Head: Normocephalic  Mouth/Throat: Oropharynx is clear and moist    Eyes: Pupils are equal, round, and reactive to light  Conjunctivae are normal  Right eye exhibits no discharge  Left eye exhibits no discharge  No scleral icterus  Neck: Neck supple  Cardiovascular: Normal rate, regular rhythm, normal heart sounds and intact distal pulses  Exam reveals no gallop and no friction rub  No murmur heard  Pulmonary/Chest: Breath sounds normal  No respiratory distress  She has no wheezes  She has no rales  Abdominal: Soft  Bowel sounds are normal  She exhibits no distension and no mass  There is no tenderness  There is no rebound and no guarding  Musculoskeletal: She exhibits no edema or deformity  Lymphadenopathy:     She has no cervical adenopathy  Neurological: She is alert   Coordination normal

## 2019-08-13 NOTE — ASSESSMENT & PLAN NOTE
Assessment and plan 1  Medicare subsequent annual wellness examination overall the patient is clinically stable and doing well, we encouraged the patient to follow a healthy and balanced diet  We recommend that the patient exercise routinely approximately 30 minutes 5 times per week   We have reviewed the patient's vaccines and have made recommendations for updates if necessary annual flu vaccine, consider the shingles vaccine at the pharmacy       We will be ordering screening laboratories which are age appropriate  Return to the office in  6 months    call if any problems

## 2019-08-13 NOTE — ASSESSMENT & PLAN NOTE
Clinically improve she has worked with the vestibular therapist and has had significant improvement in her symptoms

## 2019-08-23 ENCOUNTER — OFFICE VISIT (OUTPATIENT)
Dept: INTERNAL MEDICINE CLINIC | Facility: CLINIC | Age: 79
End: 2019-08-23
Payer: COMMERCIAL

## 2019-08-23 VITALS
WEIGHT: 123 LBS | SYSTOLIC BLOOD PRESSURE: 108 MMHG | TEMPERATURE: 98.3 F | OXYGEN SATURATION: 98 % | RESPIRATION RATE: 14 BRPM | HEART RATE: 68 BPM | HEIGHT: 60 IN | DIASTOLIC BLOOD PRESSURE: 70 MMHG | BODY MASS INDEX: 24.15 KG/M2

## 2019-08-23 DIAGNOSIS — L03.319 CELLULITIS OF PUBIC REGION: Primary | ICD-10-CM

## 2019-08-23 PROCEDURE — 99213 OFFICE O/P EST LOW 20 MIN: CPT | Performed by: NURSE PRACTITIONER

## 2019-08-23 RX ORDER — SULFAMETHOXAZOLE AND TRIMETHOPRIM 800; 160 MG/1; MG/1
1 TABLET ORAL EVERY 12 HOURS SCHEDULED
Qty: 14 TABLET | Refills: 0 | Status: SHIPPED | OUTPATIENT
Start: 2019-08-23 | End: 2019-08-30

## 2019-08-23 NOTE — PROGRESS NOTES
Assessment/Plan:    Cellulitis of pubic region  Start antibiotics  Warm compress  Go to ER if worsening       Diagnoses and all orders for this visit:    Cellulitis of pubic region  -     sulfamethoxazole-trimethoprim (BACTRIM DS) 800-160 mg per tablet; Take 1 tablet by mouth every 12 (twelve) hours for 7 days    Other orders  -     Flax OIL; Take by mouth  -     COD LIVER OIL PO; Take by mouth          Subjective:      Patient ID: Stormy Palma is a [de-identified] y o  female  Patient is here for a large painful boil of suprapubic region  She has a history of this and it usually pops or goes away    Slight fever      The following portions of the patient's history were reviewed and updated as appropriate: allergies, current medications, past family history, past medical history, past social history, past surgical history and problem list     Review of Systems   Constitutional: Negative  HENT: Negative  Eyes: Negative  Respiratory: Negative  Cardiovascular: Negative  Gastrointestinal: Negative  Musculoskeletal: Negative  Neurological: Negative  Objective:      /70 (BP Location: Left arm, Patient Position: Sitting, Cuff Size: Adult)   Pulse 68   Temp 98 3 °F (36 8 °C) (Oral)   Resp 14   Ht 5' (1 524 m)   Wt 55 8 kg (123 lb)   SpO2 98%   BMI 24 02 kg/m²          Physical Exam   Constitutional: She is oriented to person, place, and time  She appears well-developed and well-nourished  HENT:   Head: Normocephalic and atraumatic  Right Ear: External ear normal    Left Ear: External ear normal    Nose: Nose normal    Mouth/Throat: Oropharynx is clear and moist    Eyes: Pupils are equal, round, and reactive to light  Conjunctivae are normal    Neck: Normal range of motion  Neck supple  Cardiovascular: Normal rate and regular rhythm  Pulmonary/Chest: Effort normal and breath sounds normal    Abdominal: Soft  Bowel sounds are normal    Musculoskeletal: Normal range of motion  Neurological: She is alert and oriented to person, place, and time  Skin: Skin is warm and dry  Nursing note and vitals reviewed

## 2019-08-26 ENCOUNTER — OFFICE VISIT (OUTPATIENT)
Dept: INTERNAL MEDICINE CLINIC | Facility: CLINIC | Age: 79
End: 2019-08-26
Payer: COMMERCIAL

## 2019-08-26 ENCOUNTER — HOSPITAL ENCOUNTER (EMERGENCY)
Facility: HOSPITAL | Age: 79
Discharge: HOME | End: 2019-08-26
Attending: EMERGENCY MEDICINE | Admitting: EMERGENCY MEDICINE
Payer: COMMERCIAL

## 2019-08-26 VITALS
DIASTOLIC BLOOD PRESSURE: 73 MMHG | RESPIRATION RATE: 16 BRPM | BODY MASS INDEX: 24.76 KG/M2 | OXYGEN SATURATION: 95 % | HEART RATE: 68 BPM | WEIGHT: 126.76 LBS | TEMPERATURE: 97.9 F | SYSTOLIC BLOOD PRESSURE: 150 MMHG

## 2019-08-26 VITALS
OXYGEN SATURATION: 96 % | SYSTOLIC BLOOD PRESSURE: 108 MMHG | RESPIRATION RATE: 14 BRPM | HEART RATE: 66 BPM | BODY MASS INDEX: 24.15 KG/M2 | HEIGHT: 60 IN | DIASTOLIC BLOOD PRESSURE: 74 MMHG | WEIGHT: 123 LBS | TEMPERATURE: 98.6 F

## 2019-08-26 DIAGNOSIS — L02.91 CUTANEOUS ABSCESS: Primary | ICD-10-CM

## 2019-08-26 DIAGNOSIS — L03.90 CELLULITIS: ICD-10-CM

## 2019-08-26 DIAGNOSIS — L03.319 CELLULITIS OF PUBIC REGION: Primary | ICD-10-CM

## 2019-08-26 PROCEDURE — 99283 EMERGENCY DEPT VISIT LOW MDM: CPT

## 2019-08-26 PROCEDURE — 99213 OFFICE O/P EST LOW 20 MIN: CPT | Performed by: NURSE PRACTITIONER

## 2019-08-26 PROCEDURE — 99284 EMERGENCY DEPT VISIT MOD MDM: CPT | Performed by: EMERGENCY MEDICINE

## 2019-08-26 PROCEDURE — 10060 I&D ABSCESS SIMPLE/SINGLE: CPT | Performed by: EMERGENCY MEDICINE

## 2019-08-26 RX ORDER — BUPIVACAINE HYDROCHLORIDE 5 MG/ML
30 INJECTION, SOLUTION EPIDURAL; INTRACAUDAL ONCE
Status: COMPLETED | OUTPATIENT
Start: 2019-08-26 | End: 2019-08-26

## 2019-08-26 RX ORDER — CEPHALEXIN 250 MG/1
500 CAPSULE ORAL ONCE
Status: COMPLETED | OUTPATIENT
Start: 2019-08-26 | End: 2019-08-26

## 2019-08-26 RX ORDER — CEPHALEXIN 500 MG/1
500 CAPSULE ORAL 4 TIMES DAILY
Qty: 27 CAPSULE | Refills: 0 | Status: SHIPPED | OUTPATIENT
Start: 2019-08-26 | End: 2019-09-02

## 2019-08-26 RX ADMIN — BUPIVACAINE HYDROCHLORIDE 30 ML: 5 INJECTION, SOLUTION EPIDURAL; INTRACAUDAL; PERINEURAL at 17:12

## 2019-08-26 RX ADMIN — CEPHALEXIN 500 MG: 250 CAPSULE ORAL at 17:12

## 2019-08-26 NOTE — DISCHARGE INSTRUCTIONS
Diagnosis: draining  cutaneous pubic abscess with surrounding cellulitis    - expect bloody drainage for up to next 2 weeks -- can redress as needed daily     - was area daily with soap and water     - for  next week - please apply warm soapy rag to area daily for 30 minutes     - please finish the bactrim that you were  prescribed by your doctor     - keflex- - start tomorrow- 1 tablet 4 times a day for 7 days    - f or pain- over the counter tylenol 500 mg every 4 hrs -- for severe pain - only as needed -- oxycodone- only take 1 tablet  -- be very careful with this medication- can cause nausea/ dizziness--     - please inspect area daily -- please return to  the er for any spreading redness around open abscess-- any inrceasing pain in area/ any fevers- temp > 100 4- any shaking chills     - 1 would recommend that you see your primary doctor - before you leave for the beach for a recheck     - the wound will take  up to 1 month to fully heal     - at the beach do not go in the ocean or bay--  !!!!!!!!!!!!!!!!!!!!

## 2019-08-26 NOTE — PROGRESS NOTES
Assessment/Plan:    Cellulitis of pubic region  Refer to ER for eval       Diagnoses and all orders for this visit:    Cellulitis of pubic region  -     Transfer to other facility          Subjective:      Patient ID: Caroline Sierra is a [de-identified] y o  female  Patient is here for follow up of cellulitis    Worsening redness and swelling  Draining pus  Severe pain    Not better on bactrim      The following portions of the patient's history were reviewed and updated as appropriate: allergies, current medications, past family history, past medical history, past social history, past surgical history and problem list     Review of Systems   Constitutional: Negative  HENT: Negative  Eyes: Negative  Respiratory: Negative  Cardiovascular: Negative  Gastrointestinal: Negative  Musculoskeletal: Negative  Skin: Positive for wound  Neurological: Negative            Objective:      /74 (BP Location: Left arm, Patient Position: Sitting, Cuff Size: Adult)   Pulse 66   Temp 98 6 °F (37 °C) (Oral)   Resp 14   Ht 5' (1 524 m)   Wt 55 8 kg (123 lb)   SpO2 96%   BMI 24 02 kg/m²          Physical Exam   Skin:

## 2019-08-26 NOTE — ED NOTES
Pt discharge instructions reviewed by Provider, Pt has no further questions at this time  Pt awake and alert, no signs of acute distress noted  Pt ambulated out of ED with a steady gait       Jess Zavala RN  08/26/19 0754

## 2019-08-26 NOTE — ED PROCEDURE NOTE
PROCEDURE  Incision and drain  Date/Time: 8/26/2019 6:12 PM  Performed by: Matteo Mcintosh MD  Authorized by: Matteo Mcintosh MD     Patient location:  ED  Other Assisting Provider: Yes (comment)    Consent:     Consent obtained:  Verbal    Consent given by:  Patient    Risks discussed:  Bleeding, damage to other organs, infection and incomplete drainage    Alternatives discussed:  No treatment  Universal protocol:     Procedure explained and questions answered to patient or proxy's satisfaction: yes      Patient identity confirmed:  Verbally with patient  Location:     Type:  Abscess (pubic area)    Size:  4    Location: pubic area  Pre-procedure details:     Skin preparation:  Chloraprep  Anesthesia (see MAR for exact dosages): Anesthesia method:  Local infiltration    Local anesthetic:  Bupivacaine 0 5% w/o epi (15)  Procedure details:     Complexity:  Simple    Incision types:  Single straight (oblique linear incision along tissue plan over already opned up area)    Approach:  Open    Incision depth:  Subcutaneous    Wound management:  Probed and deloculated and debrided    Drainage:  Bloody and purulent    Drainage amount: Moderate    Wound treatment:  Wound left open  Post-procedure details:     Patient tolerance of procedure:   Tolerated well, no immediate complications  Comments:      -- dressing applied by er md- mild bleeding         Matteo Mcintosh MD  08/26/19 7495

## 2019-08-26 NOTE — ED NOTES
Wound bled through dressing applied by provider, Jose Alfredo notified  Pt 's wound resdressed with ABD akash Raymundo RN  08/26/19 9222

## 2019-08-29 ENCOUNTER — OFFICE VISIT (OUTPATIENT)
Dept: INTERNAL MEDICINE CLINIC | Facility: CLINIC | Age: 79
End: 2019-08-29
Payer: COMMERCIAL

## 2019-08-29 VITALS
DIASTOLIC BLOOD PRESSURE: 72 MMHG | SYSTOLIC BLOOD PRESSURE: 118 MMHG | OXYGEN SATURATION: 96 % | HEART RATE: 68 BPM | BODY MASS INDEX: 24.74 KG/M2 | HEIGHT: 60 IN | TEMPERATURE: 98.3 F | RESPIRATION RATE: 14 BRPM | WEIGHT: 126 LBS

## 2019-08-29 DIAGNOSIS — L03.319 CELLULITIS OF PUBIC REGION: Primary | ICD-10-CM

## 2019-08-29 PROCEDURE — 99214 OFFICE O/P EST MOD 30 MIN: CPT | Performed by: NURSE PRACTITIONER

## 2019-08-29 NOTE — PROGRESS NOTES
Assessment/Plan:    Cellulitis of pubic region  On keflex po  Refer to wound care       Diagnoses and all orders for this visit:    Cellulitis of pubic region  -     Ambulatory referral to Wound Care; Future          Subjective:      Patient ID: Pawel Zimmerman is a [de-identified] y o  female  Patient is here for follow up of ER  Suprapubic cellulitis/abscess drained by dr Virginia Bennett  On keflex  Finished bactrim  Wound is improving      The following portions of the patient's history were reviewed and updated as appropriate: allergies, current medications, past family history, past medical history, past social history, past surgical history and problem list     Review of Systems   Constitutional: Negative  HENT: Negative  Eyes: Negative  Respiratory: Negative  Cardiovascular: Negative  Gastrointestinal: Negative  Musculoskeletal: Negative  Skin: Positive for wound  Neurological: Negative            Objective:      /72 (BP Location: Left arm, Patient Position: Sitting, Cuff Size: Adult)   Pulse 68   Temp 98 3 °F (36 8 °C) (Oral)   Resp 14   Ht 5' (1 524 m)   Wt 57 2 kg (126 lb)   SpO2 96%   BMI 24 61 kg/m²          Physical Exam   Skin:

## 2019-08-29 NOTE — ED PROVIDER NOTES
History  Chief Complaint   Patient presents with    Cellulitis     Pt c/o cellulitis in her left groin/stomach area  Pt states she was started on bactrim on friday  Pt denies improvement  Pt was sent here for oral abx  [de-identified] yr female with 1 week of increasing pubic area swelling- pain -- seen by pmd several days ago placed on bactrim -- no hxof ca mrsa- no fevers/chills/systemic comps - seen by pmd today sent to er -- pt states wound has been draining for severaldays      History provided by:  Patient   used: No        Prior to Admission Medications   Prescriptions Last Dose Informant Patient Reported? Taking?    Ascorbic Acid (VITAMIN C) 500 MG CAPS  Self Yes No   Sig: Take 1 capsule by mouth daily   COD LIVER OIL PO  Self Yes No   Sig: Take by mouth   Calcium 500-125 MG-UNIT TABS  Self Yes No   Sig: Take 1 tablet by mouth daily   Cholecalciferol (VITAMIN D3) 1000 units CAPS  Self Yes No   Sig: Take by mouth   Flax OIL  Self Yes No   Sig: Take by mouth   Glucosamine-Chondroit-Vit C-Mn (GLUCOSAMINE 1500 COMPLEX) CAPS  Self Yes No   Sig: Take by mouth   Kelp 150 MCG TABS  Self Yes No   Sig: Take by mouth   Multiple Vitamin (MULTIVITAMIN) tablet  Self Yes No   Sig: Take 1 tablet by mouth daily   Omega-3 Fatty Acids (OMEGA-3 FISH OIL) 1200 MG CAPS  Self Yes No   Sig: Take by mouth   Probiotic Product (PROBIOTIC-10) CAPS  Self Yes No   Sig: Take by mouth   RESTASIS 0 05 % ophthalmic emulsion  Self Yes No   sulfamethoxazole-trimethoprim (BACTRIM DS) 800-160 mg per tablet  Self No No   Sig: Take 1 tablet by mouth every 12 (twelve) hours for 7 days      Facility-Administered Medications: None       Past Medical History:   Diagnosis Date    Arthritis     Colon cancer ([facility] [address] ) 12    age 45       Past Surgical History:   Procedure Laterality Date     SECTION      COLON SURGERY      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    TOTAL ABDOMINAL HYSTERECTOMY Bilateral     age 37   Willatracy Romero TOTAL ABDOMINAL HYSTERECTOMY W/ BILATERAL SALPINGOOPHORECTOMY Bilateral 1983    fibroids; age 37       Family History   Problem Relation Age of Onset    Cancer Family     Lung cancer Sister 80    Colon cancer Sister 79    Breast cancer Sister 61    No Known Problems Daughter     No Known Problems Daughter     No Known Problems Maternal Aunt      I have reviewed and agree with the history as documented  Social History     Tobacco Use    Smoking status: Former Smoker    Smokeless tobacco: Never Used   Substance Use Topics    Alcohol use: No    Drug use: No        Review of Systems   Constitutional: Negative  HENT: Negative  Eyes: Negative  Respiratory: Negative  Cardiovascular: Negative  Gastrointestinal: Negative  Endocrine: Negative  Genitourinary: Negative  Musculoskeletal: Negative  Skin: Positive for wound  Negative for color change, pallor and rash  Allergic/Immunologic: Negative  Neurological: Negative  Hematological: Negative  Psychiatric/Behavioral: Negative  Physical Exam  Physical Exam   Constitutional: She is oriented to person, place, and time  She appears well-developed and well-nourished  No distress  avss-- pulse ox 96 % on ra- interpretation is normal- no intervention    HENT:   Head: Normocephalic and atraumatic  Eyes: Pupils are equal, round, and reactive to light  Conjunctivae and EOM are normal  Right eye exhibits no discharge  Left eye exhibits no discharge  No scleral icterus  Mm pink   Neck: Normal range of motion  Neck supple  No JVD present  No tracheal deviation present  No thyromegaly present  Cardiovascular: Normal rate, regular rhythm, normal heart sounds and intact distal pulses  Exam reveals no gallop and no friction rub  No murmur heard  Pulmonary/Chest: Effort normal and breath sounds normal  No stridor  No respiratory distress  She has no wheezes  She has no rales  She exhibits no tenderness  Abdominal: Soft   Bowel sounds are normal  She exhibits no distension and no mass  There is no tenderness  There is no rebound and no guarding  No hernia  Genitourinary:   Genitourinary Comments: Normal perineal/ anal exam    Lymphadenopathy:     She has no cervical adenopathy  Neurological: She is alert and oriented to person, place, and time  No cranial nerve deficit or sensory deficit  She exhibits normal muscle tone  Coordination normal    Skin: Capillary refill takes less than 2 seconds  No rash noted  She is not diaphoretic  There is erythema  No pallor  Upper pubic area in midline with 4 cm open draining abscess  with surrounding 10 cm in length area of erythema-- no crepitus-- no ascending erythema   Psychiatric: She has a normal mood and affect  Her behavior is normal  Judgment and thought content normal    Nursing note and vitals reviewed        Vital Signs  ED Triage Vitals [08/26/19 1642]   Temperature Pulse Respirations Blood Pressure SpO2   97 9 °F (36 6 °C) 68 16 162/75 95 %      Temp Source Heart Rate Source Patient Position - Orthostatic VS BP Location FiO2 (%)   Oral Monitor Sitting Right arm --      Pain Score       --           Vitals:    08/26/19 1642 08/26/19 1830   BP: 162/75 150/73   Pulse: 68 68   Patient Position - Orthostatic VS: Sitting Sitting         Visual Acuity      ED Medications  Medications   cephalexin (KEFLEX) capsule 500 mg (500 mg Oral Given 8/26/19 1712)   bupivacaine (PF) (MARCAINE) 0 5 % injection 30 mL (30 mL Infiltration Given 8/26/19 1712)       Diagnostic Studies  Results Reviewed     None                 No orders to display              Procedures  Procedures       ED Course  ED Course as of Aug 29 1949   Mon Aug 26, 2019   1715 Er md note- for field block of cutaneous abscess--  total of 10 ml of  5 % of marcaine instilled around abscess  all under negative pressure prior to instillation -- pt tolerated procedure well- no complaints/ complications- very minimal bleeding MDM    Disposition  Final diagnoses:   Cutaneous abscess   Cellulitis     Time reflects when diagnosis was documented in both MDM as applicable and the Disposition within this note     Time User Action Codes Description Comment    8/26/2019  6:15 PM [place] Abby Add [L02 91] Cutaneous abscess     8/26/2019  6:15 PM [place] Add [L03 90] Cellulitis       ED Disposition     ED Disposition Condition Date/Time Comment    Discharge Stable Mon Aug 26, 2019  5:18 PM Hannah Lopez discharge to home/self care              Follow-up Information    None         Discharge Medication List as of 8/26/2019  6:23 PM      START taking these medications    Details   cephalexin (KEFLEX) 500 mg capsule Take 1 capsule (500 mg total) by mouth 4 (four) times a day for 27 doses, Starting Mon 8/26/2019, Until Mon 9/2/2019, Normal         CONTINUE these medications which have NOT CHANGED    Details   Ascorbic Acid (VITAMIN C) 500 MG CAPS Take 1 capsule by mouth daily, Historical Med      Calcium 500-125 MG-UNIT TABS Take 1 tablet by mouth daily, Starting Fri 8/4/2017, Historical Med      Cholecalciferol (VITAMIN D3) 1000 units CAPS Take by mouth, Historical Med      COD LIVER OIL PO Take by mouth, Historical Med      Flax OIL Take by mouth, Historical Med      Glucosamine-Chondroit-Vit C-Mn (GLUCOSAMINE 1500 COMPLEX) CAPS Take by mouth, Historical Med      Kelp 150 MCG TABS Take by mouth, Historical Med      Multiple Vitamin (MULTIVITAMIN) tablet Take 1 tablet by mouth daily, Historical Med      Omega-3 Fatty Acids (OMEGA-3 FISH OIL) 1200 MG CAPS Take by mouth, Historical Med      Probiotic Product (PROBIOTIC-10) CAPS Take by mouth, Historical Med      RESTASIS 0 05 % ophthalmic emulsion Starting Wed 2/21/2018, Historical Med      sulfamethoxazole-trimethoprim (BACTRIM DS) 800-160 mg per tablet Take 1 tablet by mouth every 12 (twelve) hours for 7 days, Starting Fri 8/23/2019, Until Fri 8/30/2019, Normal           No discharge procedures on file      ED Provider  Electronically Signed by           Celine Munguia MD  08/29/19 1219

## 2019-09-05 ENCOUNTER — APPOINTMENT (OUTPATIENT)
Dept: WOUND CARE | Facility: HOSPITAL | Age: 79
End: 2019-09-05
Payer: COMMERCIAL

## 2019-09-05 ENCOUNTER — LAB REQUISITION (OUTPATIENT)
Dept: LAB | Facility: HOSPITAL | Age: 79
End: 2019-09-05
Payer: COMMERCIAL

## 2019-09-05 DIAGNOSIS — L02.234 CARBUNCLE OF GROIN: ICD-10-CM

## 2019-09-05 PROCEDURE — 87205 SMEAR GRAM STAIN: CPT | Performed by: PREVENTIVE MEDICINE

## 2019-09-05 PROCEDURE — 99213 OFFICE O/P EST LOW 20 MIN: CPT

## 2019-09-05 PROCEDURE — 87070 CULTURE OTHR SPECIMN AEROBIC: CPT | Performed by: PREVENTIVE MEDICINE

## 2019-09-05 PROCEDURE — 10060 I&D ABSCESS SIMPLE/SINGLE: CPT

## 2019-09-07 LAB
BACTERIA WND AEROBE CULT: ABNORMAL
GRAM STN SPEC: ABNORMAL

## 2019-09-09 ENCOUNTER — TELEPHONE (OUTPATIENT)
Dept: INTERNAL MEDICINE CLINIC | Facility: CLINIC | Age: 79
End: 2019-09-09

## 2019-09-09 NOTE — TELEPHONE ENCOUNTER
Patient went to wound care on Thursday 9/5/19 to get a wound cut, drained and packed  They told patient that she would need to continue this at home but patient said where the wound is located it is very difficult for her to do herself so asked her if she had a friend to do it for her  She is mobile and did not need VN at home for this so she asked if this was something we would do here  I asked Bonita King and she advised that we do not remove or pack here  I did inform patient and she asked what is she suppose to do if wound care will not do it and she can not do it? Is she suppose to pay $95 dollars each time at the ER? She finds this ridiculous and she is asking for your recommendation for treatment of her wound 6" bellow belly button that needs to be packed?

## 2019-09-10 ENCOUNTER — APPOINTMENT (OUTPATIENT)
Dept: WOUND CARE | Facility: HOSPITAL | Age: 79
End: 2019-09-10
Payer: COMMERCIAL

## 2019-09-10 ENCOUNTER — CLINICAL SUPPORT (OUTPATIENT)
Dept: INTERNAL MEDICINE CLINIC | Facility: CLINIC | Age: 79
End: 2019-09-10
Payer: COMMERCIAL

## 2019-09-10 DIAGNOSIS — Z23 NEED FOR INFLUENZA VACCINATION: Primary | ICD-10-CM

## 2019-09-10 PROCEDURE — G0008 ADMIN INFLUENZA VIRUS VAC: HCPCS

## 2019-09-10 PROCEDURE — 90662 IIV NO PRSV INCREASED AG IM: CPT

## 2019-09-10 PROCEDURE — 97597 DBRDMT OPN WND 1ST 20 CM/<: CPT

## 2019-09-10 NOTE — TELEPHONE ENCOUNTER
Patient was given the information for Dr Nadia Austin and advised to call to see if they are able to assist her with the wound packing

## 2019-09-24 ENCOUNTER — APPOINTMENT (OUTPATIENT)
Dept: WOUND CARE | Facility: HOSPITAL | Age: 79
End: 2019-09-24
Payer: COMMERCIAL

## 2019-09-24 PROCEDURE — 97597 DBRDMT OPN WND 1ST 20 CM/<: CPT

## 2019-10-02 ENCOUNTER — TELEPHONE (OUTPATIENT)
Dept: OTHER | Facility: OTHER | Age: 79
End: 2019-10-02

## 2019-10-02 NOTE — TELEPHONE ENCOUNTER
Patient would like to know if the insurance wellness form was signed by Dr Vandana Cheung, if so can she go pick it up

## 2019-10-03 ENCOUNTER — APPOINTMENT (OUTPATIENT)
Dept: WOUND CARE | Facility: HOSPITAL | Age: 79
End: 2019-10-03
Payer: COMMERCIAL

## 2019-10-03 PROCEDURE — 99212 OFFICE O/P EST SF 10 MIN: CPT

## 2019-10-23 ENCOUNTER — DOCUMENTATION (OUTPATIENT)
Dept: INTERNAL MEDICINE CLINIC | Facility: CLINIC | Age: 79
End: 2019-10-23

## 2019-10-23 ENCOUNTER — TELEPHONE (OUTPATIENT)
Dept: INTERNAL MEDICINE CLINIC | Facility: CLINIC | Age: 79
End: 2019-10-23

## 2019-10-23 NOTE — TELEPHONE ENCOUNTER
LMOM for pt to [facility] - she wants to know if we take [employer] as she is changing insurances - left message to return call to let her know we do not accept [address], [employer] plan (duel complete is ok) all per insurance form we have

## 2020-08-10 ENCOUNTER — TRANSCRIBE ORDERS (OUTPATIENT)
Dept: LAB | Facility: CLINIC | Age: 80
End: 2020-08-10

## 2020-08-10 ENCOUNTER — APPOINTMENT (OUTPATIENT)
Dept: LAB | Facility: CLINIC | Age: 80
End: 2020-08-10
Payer: COMMERCIAL

## 2020-08-10 DIAGNOSIS — R73.03 PREDIABETES: ICD-10-CM

## 2020-08-10 LAB
ALBUMIN SERPL BCP-MCNC: 3.9 G/DL (ref 3.5–5)
ALP SERPL-CCNC: 65 U/L (ref 46–116)
ALT SERPL W P-5'-P-CCNC: 26 U/L (ref 12–78)
ANION GAP SERPL CALCULATED.3IONS-SCNC: 5 MMOL/L (ref 4–13)
AST SERPL W P-5'-P-CCNC: 21 U/L (ref 5–45)
BILIRUB SERPL-MCNC: 0.44 MG/DL (ref 0.2–1)
BUN SERPL-MCNC: 18 MG/DL (ref 5–25)
CALCIUM SERPL-MCNC: 8.9 MG/DL (ref 8.3–10.1)
CHLORIDE SERPL-SCNC: 103 MMOL/L (ref 100–108)
CO2 SERPL-SCNC: 30 MMOL/L (ref 21–32)
CREAT SERPL-MCNC: 0.85 MG/DL (ref 0.6–1.3)
EST. AVERAGE GLUCOSE BLD GHB EST-MCNC: 117 MG/DL
GFR SERPL CREATININE-BSD FRML MDRD: 64 ML/MIN/1.73SQ M
GLUCOSE P FAST SERPL-MCNC: 87 MG/DL (ref 65–99)
HBA1C MFR BLD: 5.7 %
POTASSIUM SERPL-SCNC: 4.6 MMOL/L (ref 3.5–5.3)
PROT SERPL-MCNC: 7.7 G/DL (ref 6.4–8.2)
SODIUM SERPL-SCNC: 138 MMOL/L (ref 136–145)

## 2020-08-10 PROCEDURE — 36415 COLL VENOUS BLD VENIPUNCTURE: CPT

## 2020-08-10 PROCEDURE — 83036 HEMOGLOBIN GLYCOSYLATED A1C: CPT

## 2020-08-10 PROCEDURE — 80053 COMPREHEN METABOLIC PANEL: CPT

## 2020-08-14 ENCOUNTER — OFFICE VISIT (OUTPATIENT)
Dept: INTERNAL MEDICINE CLINIC | Facility: CLINIC | Age: 80
End: 2020-08-14
Payer: COMMERCIAL

## 2020-08-14 VITALS
OXYGEN SATURATION: 98 % | SYSTOLIC BLOOD PRESSURE: 124 MMHG | BODY MASS INDEX: 24.35 KG/M2 | TEMPERATURE: 98 F | HEART RATE: 59 BPM | HEIGHT: 60 IN | RESPIRATION RATE: 16 BRPM | DIASTOLIC BLOOD PRESSURE: 80 MMHG | WEIGHT: 124 LBS

## 2020-08-14 DIAGNOSIS — I87.2 VENOUS INSUFFICIENCY: ICD-10-CM

## 2020-08-14 DIAGNOSIS — R32 URINARY INCONTINENCE, UNSPECIFIED TYPE: ICD-10-CM

## 2020-08-14 DIAGNOSIS — L98.9 SKIN LESION: ICD-10-CM

## 2020-08-14 DIAGNOSIS — E03.9 HYPOTHYROIDISM, UNSPECIFIED TYPE: ICD-10-CM

## 2020-08-14 DIAGNOSIS — Z00.00 MEDICARE ANNUAL WELLNESS VISIT, SUBSEQUENT: Primary | ICD-10-CM

## 2020-08-14 DIAGNOSIS — R73.03 PREDIABETES: ICD-10-CM

## 2020-08-14 PROCEDURE — G0439 PPPS, SUBSEQ VISIT: HCPCS | Performed by: INTERNAL MEDICINE

## 2020-08-14 PROCEDURE — 3079F DIAST BP 80-89 MM HG: CPT | Performed by: INTERNAL MEDICINE

## 2020-08-14 PROCEDURE — 1170F FXNL STATUS ASSESSED: CPT | Performed by: INTERNAL MEDICINE

## 2020-08-14 PROCEDURE — 1036F TOBACCO NON-USER: CPT | Performed by: INTERNAL MEDICINE

## 2020-08-14 PROCEDURE — 3288F FALL RISK ASSESSMENT DOCD: CPT | Performed by: INTERNAL MEDICINE

## 2020-08-14 PROCEDURE — 1125F AMNT PAIN NOTED PAIN PRSNT: CPT | Performed by: INTERNAL MEDICINE

## 2020-08-14 PROCEDURE — 3074F SYST BP LT 130 MM HG: CPT | Performed by: INTERNAL MEDICINE

## 2020-08-14 PROCEDURE — 3725F SCREEN DEPRESSION PERFORMED: CPT | Performed by: INTERNAL MEDICINE

## 2020-08-14 PROCEDURE — 99214 OFFICE O/P EST MOD 30 MIN: CPT | Performed by: INTERNAL MEDICINE

## 2020-08-14 PROCEDURE — 1160F RVW MEDS BY RX/DR IN RCRD: CPT | Performed by: INTERNAL MEDICINE

## 2020-08-14 PROCEDURE — 4040F PNEUMOC VAC/ADMIN/RCVD: CPT | Performed by: INTERNAL MEDICINE

## 2020-08-14 PROCEDURE — 1101F PT FALLS ASSESS-DOCD LE1/YR: CPT | Performed by: INTERNAL MEDICINE

## 2020-08-14 NOTE — PROGRESS NOTES
Assessment/Plan:    Hypothyroidism  Hypothyroidism controlled the patient is currently euthyroid I will be ordering a TSH prior to the next office visit and the patient will continue with current medical regiment; we will continue to monitor the patient's progress  Venous insufficiency  Compression stockings, leg elevation, sodium restriction    Skin lesion  Will have the patient see Dermatology    Absence of bladder continence  She reports me symptoms compatible of urge incontinence at this point time she does not want treatment or evaluation would like to let me know about her symptoms and if they change or worsen she will notify me for further evaluation for now she will use a pad    Prediabetes  Pre diabetes -I have counseled the patient to follow a healthy balanced diet, I have counseled patient reduce carbohydrates and sweets in the diet, I would like the patient exercise routinely  I will be checking hemoglobin A1c and comprehensive metabolic panel  Have counseled patient about the prevention of diabetes, and the risk of progression to type 2 diabetes  Problem List Items Addressed This Visit        Endocrine    Hypothyroidism     Hypothyroidism controlled the patient is currently euthyroid I will be ordering a TSH prior to the next office visit and the patient will continue with current medical regiment; we will continue to monitor the patient's progress           Relevant Orders    TSH, 3rd generation       Cardiovascular and Mediastinum    Venous insufficiency     Compression stockings, leg elevation, sodium restriction         Relevant Orders    Compression Stocking       Musculoskeletal and Integument    Skin lesion     Will have the patient see Dermatology         Relevant Orders    Ambulatory referral to Dermatology       Other    Medicare annual wellness visit, subsequent - Primary    Prediabetes     Pre diabetes -I have counseled the patient to follow a healthy balanced diet, I have counseled patient reduce carbohydrates and sweets in the diet, I would like the patient exercise routinely  I will be checking hemoglobin A1c and comprehensive metabolic panel  Have counseled patient about the prevention of diabetes, and the risk of progression to type 2 diabetes  Relevant Orders    Comprehensive metabolic panel    Hemoglobin A1C    Lipid Panel with Direct LDL reflex    Absence of bladder continence     She reports me symptoms compatible of urge incontinence at this point time she does not want treatment or evaluation would like to let me know about her symptoms and if they change or worsen she will notify me for further evaluation for now she will use a pad               Declines colonoscopy and uro  RTO in 6 months call if any problems  Subjective:      Patient ID: Paco Cantu is a 78 y o  female  HPI 78-year old female coming in for a follow up office visit regarding The patient reports me compliant taking medications without untoward side effects the  The patient is here to review his medical condition, update me on the medical condition and the patient reports me no hospitalizations and no ER visits  incontinance too much coffee wants derm right calf bump and with pressing it hurts pt to goto derm 1 period trying to follow healthy diet and remains active    The following portions of the patient's history were reviewed and updated as appropriate: allergies, current medications, past family history, past medical history, past social history, past surgical history and problem list     Review of Systems   Constitutional: Negative for activity change, appetite change and unexpected weight change  HENT: Negative for congestion  Respiratory: Negative for cough and shortness of breath  Cardiovascular: Negative for chest pain  Gastrointestinal: Negative for abdominal pain, diarrhea, nausea and vomiting  Objective:    No follow-ups on file      No results found       No Known Allergies    Past Medical History:   Diagnosis Date    Arthritis     Colon cancer ([facility] [address] )     age 45     Past Surgical History:   Procedure Laterality Date     SECTION      COLON SURGERY  1992    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    TOTAL ABDOMINAL HYSTERECTOMY Bilateral     age 37   Emily [place] TOTAL ABDOMINAL HYSTERECTOMY W/ BILATERAL SALPINGOOPHORECTOMY Bilateral     fibroids; age 37     Current Outpatient Medications on File Prior to Visit   Medication Sig Dispense Refill    Ascorbic Acid (VITAMIN C) 500 MG CAPS Take 1 capsule by mouth daily      Calcium 500-125 MG-UNIT TABS Take 1 tablet by mouth daily      Cholecalciferol (VITAMIN D3) 1000 units CAPS Take by mouth      COD LIVER OIL PO Take by mouth      Glucosamine-Chondroit-Vit C-Mn (GLUCOSAMINE 1500 COMPLEX) CAPS Take by mouth      Kelp 150 MCG TABS Take by mouth      Multiple Vitamin (MULTIVITAMIN) tablet Take 1 tablet by mouth daily      Omega-3 Fatty Acids (OMEGA-3 FISH OIL) 1200 MG CAPS Take by mouth      Probiotic Product (PROBIOTIC-10) CAPS Take by mouth      RESTASIS 0 05 % ophthalmic emulsion        No current facility-administered medications on file prior to visit  Family History   Problem Relation Age of Onset    Cancer Family     Lung cancer Sister 80    Colon cancer Sister 79    Breast cancer Sister 61    No Known Problems Daughter     No Known Problems Daughter     No Known Problems Maternal Aunt      Social History     Socioeconomic History    Marital status:       Spouse name: Not on file    Number of children: Not on file    Years of education: Not on file    Highest education level: Not on file   Occupational History    Not on file   Social Needs    Financial resource strain: Not on file    Food insecurity     Worry: Not on file     Inability: Not on file    Transportation needs     Medical: Not on file     Non-medical: Not on file   Tobacco Use    Smoking status: Former Smoker  Smokeless tobacco: Never Used   Substance and Sexual Activity    Alcohol use: No    Drug use: No    Sexual activity: Never   Lifestyle    Physical activity     Days per week: Not on file     Minutes per session: Not on file    Stress: Not on file   Relationships    Social connections     Talks on phone: Not on file     Gets together: Not on file     Attends Yarsanism service: Not on file     Active member of club or organization: Not on file     Attends meetings of clubs or organizations: Not on file     Relationship status: Not on file    Intimate partner violence     Fear of current or ex partner: Not on file     Emotionally abused: Not on file     Physically abused: Not on file     Forced sexual activity: Not on file   Other Topics Concern    Not on file   Social History Narrative    Not on file     Vitals:    08/14/20 1443   BP: 124/80   Pulse: 59   Resp: 16   Temp: 98 °F (36 7 °C)   TempSrc: Temporal   SpO2: 98%   Weight: 56 2 kg (124 lb)   Height: 5' (1 524 m)     Results for orders placed or performed in visit on 08/10/20   Comprehensive metabolic panel   Result Value Ref Range    Sodium 138 136 - 145 mmol/L    Potassium 4 6 3 5 - 5 3 mmol/L    Chloride 103 100 - 108 mmol/L    CO2 30 21 - 32 mmol/L    ANION GAP 5 4 - 13 mmol/L    BUN 18 5 - 25 mg/dL    Creatinine 0 85 0 60 - 1 30 mg/dL    Glucose, Fasting 87 65 - 99 mg/dL    Calcium 8 9 8 3 - 10 1 mg/dL    AST 21 5 - 45 U/L    ALT 26 12 - 78 U/L    Alkaline Phosphatase 65 46 - 116 U/L    Total Protein 7 7 6 4 - 8 2 g/dL    Albumin 3 9 3 5 - 5 0 g/dL    Total Bilirubin 0 44 0 20 - 1 00 mg/dL    eGFR 64 ml/min/1 73sq m   Hemoglobin A1C   Result Value Ref Range    Hemoglobin A1C 5 7 (H) Normal 3 8-5 6%; PreDiabetic 5 7-6 4%; Diabetic >=6 5%; Glycemic control for adults with diabetes <7 0% %     mg/dl     Weight (last 2 days)     Date/Time   Weight    08/14/20 1443   56 2 (124)            Body mass index is 24 22 kg/m²    BP      Temp Pulse     Resp      SpO2        Vitals:    08/14/20 1443   Weight: 56 2 kg (124 lb)     Vitals:    08/14/20 1443   Weight: 56 2 kg (124 lb)       /80   Pulse 59   Temp 98 °F (36 7 °C) (Temporal)   Resp 16   Ht 5' (1 524 m)   Wt 56 2 kg (124 lb)   SpO2 98%   BMI 24 22 kg/m²          Physical Exam  Constitutional:       Appearance: She is well-developed  HENT:      Head: Normocephalic  Eyes:      General: No scleral icterus  Right eye: No discharge  Left eye: No discharge  Conjunctiva/sclera: Conjunctivae normal       Pupils: Pupils are equal, round, and reactive to light  Neck:      Musculoskeletal: Neck supple  Cardiovascular:      Rate and Rhythm: Normal rate and regular rhythm  Heart sounds: Normal heart sounds  No murmur  No friction rub  No gallop  Pulmonary:      Effort: No respiratory distress  Breath sounds: Normal breath sounds  No wheezing or rales  Abdominal:      General: Bowel sounds are normal  There is no distension  Palpations: Abdomen is soft  There is no mass  Tenderness: There is no abdominal tenderness  There is no guarding or rebound  Musculoskeletal:         General: No deformity  Lymphadenopathy:      Cervical: No cervical adenopathy  Neurological:      Mental Status: She is alert        Coordination: Coordination normal

## 2020-08-14 NOTE — PATIENT INSTRUCTIONS
Medicare Preventive Visit Patient Instructions  Thank you for completing your Welcome to Medicare Visit or Medicare Annual Wellness Visit today  Your next wellness visit will be due in one year (8/14/2021)  The screening/preventive services that you may require over the next 5-10 years are detailed below  Some tests may not apply to you based off risk factors and/or age  Screening tests ordered at today's visit but not completed yet may show as past due  Also, please note that scanned in results may not display below  Preventive Screenings:  Service Recommendations Previous Testing/Comments   Colorectal Cancer Screening  * Colonoscopy    * Fecal Occult Blood Test (FOBT)/Fecal Immunochemical Test (FIT)  * Fecal DNA/Cologuard Test  * Flexible Sigmoidoscopy Age: 54-65 years old   Colonoscopy: every 10 years (may be performed more frequently if at higher risk)  OR  FOBT/FIT: every 1 year  OR  Cologuard: every 3 years  OR  Sigmoidoscopy: every 5 years  Screening may be recommended earlier than age 48 if at higher risk for colorectal cancer  Also, an individualized decision between you and your healthcare provider will decide whether screening between the ages of 74-80 would be appropriate  Colonoscopy: Not on file  FOBT/FIT: Not on file  Cologuard: Not on file  Sigmoidoscopy: Not on file         Breast Cancer Screening Age: 36 years old  Frequency: every 1-2 years  Not required if history of left and right mastectomy Mammogram: 05/15/2019       Cervical Cancer Screening Between the ages of 21-29, pap smear recommended once every 3 years  Between the ages of 33-67, can perform pap smear with HPV co-testing every 5 years     Recommendations may differ for women with a history of total hysterectomy, cervical cancer, or abnormal pap smears in past  Pap Smear: 04/16/2019       Hepatitis C Screening Once for adults born between 1945 and 1965  More frequently in patients at high risk for Hepatitis C Hep C Antibody: Not on file       Diabetes Screening 1-2 times per year if you're at risk for diabetes or have pre-diabetes Fasting glucose: 87 mg/dL   A1C: 5 7 %       Cholesterol Screening Once every 5 years if you don't have a lipid disorder  May order more often based on risk factors  Lipid panel: 08/06/2018         Other Preventive Screenings Covered by Medicare:  1  Abdominal Aortic Aneurysm (AAA) Screening: covered once if your at risk  You're considered to be at risk if you have a family history of AAA  2  Lung Cancer Screening: covers low dose CT scan once per year if you meet all of the following conditions: (1) Age 50-69; (2) No signs or symptoms of lung cancer; (3) Current smoker or have quit smoking within the last 15 years; (4) You have a tobacco smoking history of at least 30 pack years (packs per day multiplied by number of years you smoked); (5) You get a written order from a healthcare provider  3  Glaucoma Screening: covered annually if you're considered high risk: (1) You have diabetes OR (2) Family history of glaucoma OR (3)  aged 48 and older OR (3)  American aged 72 and older  3  Osteoporosis Screening: covered every 2 years if you meet one of the following conditions: (1) You're estrogen deficient and at risk for osteoporosis based off medical history and other findings; (2) Have a vertebral abnormality; (3) On glucocorticoid therapy for more than 3 months; (4) Have primary hyperparathyroidism; (5) On osteoporosis medications and need to assess response to drug therapy  · Last bone density test (DXA Scan): 08/21/2018  5  HIV Screening: covered annually if you're between the age of 12-76  Also covered annually if you are younger than 13 and older than 72 with risk factors for HIV infection  For pregnant patients, it is covered up to 3 times per pregnancy      Immunizations:  Immunization Recommendations   Influenza Vaccine Annual influenza vaccination during flu season is recommended for all persons aged >= 6 months who do not have contraindications   Pneumococcal Vaccine (Prevnar and Pneumovax)  * Prevnar = PCV13  * Pneumovax = PPSV23   Adults 25-60 years old: 1-3 doses may be recommended based on certain risk factors  Adults 72 years old: Prevnar (PCV13) vaccine recommended followed by Pneumovax (PPSV23) vaccine  If already received PPSV23 since turning 65, then PCV13 recommended at least one year after PPSV23 dose  Hepatitis B Vaccine 3 dose series if at intermediate or high risk (ex: diabetes, end stage renal disease, liver disease)   Tetanus (Td) Vaccine - COST NOT COVERED BY MEDICARE PART B Following completion of primary series, a booster dose should be given every 10 years to maintain immunity against tetanus  Td may also be given as tetanus wound prophylaxis  Tdap Vaccine - COST NOT COVERED BY MEDICARE PART B Recommended at least once for all adults  For pregnant patients, recommended with each pregnancy  Shingles Vaccine (Shingrix) - COST NOT COVERED BY MEDICARE PART B  2 shot series recommended in those aged 48 and above     Health Maintenance Due:      Topic Date Due    DXA SCAN  08/21/2020     Immunizations Due:      Topic Date Due    Influenza Vaccine  07/01/2020     Advance Directives   What are advance directives? Advance directives are legal documents that state your wishes and plans for medical care  These plans are made ahead of time in case you lose your ability to make decisions for yourself  Advance directives can apply to any medical decision, such as the treatments you want, and if you want to donate organs  What are the types of advance directives? There are many types of advance directives, and each state has rules about how to use them  You may choose a combination of any of the following:  · Living will: This is a written record of the treatment you want   You can also choose which treatments you do not want, which to limit, and which to stop at a certain time  This includes surgery, medicine, IV fluid, and tube feedings  · Durable power of  for healthcare [facility]): This is a written record that states who you want to make healthcare choices for you when you are unable to make them for yourself  This person, called a proxy, is usually a family member or a friend  You may choose more than 1 proxy  · Do not resuscitate (DNR) order:  A DNR order is used in case your heart stops beating or you stop breathing  It is a request not to have certain forms of treatment, such as CPR  A DNR order may be included in other types of advance directives  · Medical directive: This covers the care that you want if you are in a coma, near death, or unable to make decisions for yourself  You can list the treatments you want for each condition  Treatment may include pain medicine, surgery, blood transfusions, dialysis, IV or tube feedings, and a ventilator (breathing machine)  · Values history: This document has questions about your views, beliefs, and how you feel and think about life  This information can help others choose the care that you would choose  Why are advance directives important? An advance directive helps you control your care  Although spoken wishes may be used, it is better to have your wishes written down  Spoken wishes can be misunderstood, or not followed  Treatments may be given even if you do not want them  An advance directive may make it easier for your family to make difficult choices about your care  © Copyright [employer] 2018 Information is for End User's use only and may not be sold, redistributed or otherwise used for commercial purposes   All illustrations and images included in CareNotes® are the copyrighted property of A D A M , Inc  or [facility]

## 2020-08-14 NOTE — PROGRESS NOTES
Assessment and Plan:     Problem List Items Addressed This Visit        Endocrine    Hypothyroidism     Hypothyroidism controlled the patient is currently euthyroid I will be ordering a TSH prior to the next office visit and the patient will continue with current medical regiment; we will continue to monitor the patient's progress  Relevant Orders    TSH, 3rd generation       Cardiovascular and Mediastinum    Venous insufficiency     Compression stockings, leg elevation, sodium restriction         Relevant Orders    Compression Stocking       Musculoskeletal and Integument    Skin lesion     Will have the patient see Dermatology         Relevant Orders    Ambulatory referral to Dermatology       Other    Medicare annual wellness visit, subsequent - Primary     Assessment and plan 1  Medicare subsequent annual wellness examination overall the patient is clinically stable and doing well, we encouraged the patient to follow a healthy and balanced diet  We recommend that the patient exercise routinely approximately 30 minutes 5 times per week   We have reviewed the patient's vaccines and have made recommendations for updates if necessary   annual flu vaccine     We will be ordering screening laboratories which are age appropriate  Return to the office in      call if any problems  Prediabetes     Pre diabetes -I have counseled the patient to follow a healthy balanced diet, I have counseled patient reduce carbohydrates and sweets in the diet, I would like the patient exercise routinely  I will be checking hemoglobin A1c and comprehensive metabolic panel  Have counseled patient about the prevention of diabetes, and the risk of progression to type 2 diabetes           Relevant Orders    Comprehensive metabolic panel    Hemoglobin A1C    Lipid Panel with Direct LDL reflex    Absence of bladder continence     She reports me symptoms compatible of urge incontinence at this point time she does not want treatment or evaluation would like to let me know about her symptoms and if they change or worsen she will notify me for further evaluation for now she will use a pad                Preventive health issues were discussed with patient, and age appropriate screening tests were ordered as noted in patient's After Visit Summary  Personalized health advice and appropriate referrals for health education or preventive services given if needed, as noted in patient's After Visit Summary  History of Present Illness:     Patient presents for Medicare Annual Wellness visit    Patient Care Team:  Velma Hernandez DO as PCP - General  Mingo Chavez DO     Problem List:     Patient Active Problem List   Diagnosis    Medicare annual wellness visit, subsequent    Osteopenia    Fall    Benign positional vertigo, left    Head injury    Hypothyroidism    Prediabetes    Screening for cardiovascular condition    Absence of bladder continence    Cellulitis of pubic region    Venous insufficiency    Skin lesion      Past Medical and Surgical History:     Past Medical History:   Diagnosis Date    Arthritis     Colon cancer ([address] ) 12    age 45     Past Surgical History:   Procedure Laterality Date     SECTION      COLON SURGERY      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    TOTAL ABDOMINAL HYSTERECTOMY Bilateral     age 37   [facility] TOTAL ABDOMINAL HYSTERECTOMY W/ BILATERAL SALPINGOOPHORECTOMY Bilateral     fibroids; age 37      Family History:     Family History   Problem Relation Age of Onset    Cancer Family     Lung cancer Sister 80    Colon cancer Sister 79    Breast cancer Sister 61    No Known Problems Daughter     No Known Problems Daughter     No Known Problems Maternal Aunt       Social History:        Social History     Socioeconomic History    Marital status:       Spouse name: None    Number of children: None    Years of education: None    Highest education level: None Occupational History    None   Social Needs    Financial resource strain: None    Food insecurity     Worry: None     Inability: None    Transportation needs     Medical: None     Non-medical: None   Tobacco Use    Smoking status: Former Smoker    Smokeless tobacco: Never Used   Substance and Sexual Activity    Alcohol use: No    Drug use: No    Sexual activity: Never   Lifestyle    Physical activity     Days per week: None     Minutes per session: None    Stress: None   Relationships    Social connections     Talks on phone: None     Gets together: None     Attends Nondenominational service: None     Active member of club or organization: None     Attends meetings of clubs or organizations: None     Relationship status: None    Intimate partner violence     Fear of current or ex partner: None     Emotionally abused: None     Physically abused: None     Forced sexual activity: None   Other Topics Concern    None   Social History Narrative    None      Medications and Allergies:     Current Outpatient Medications   Medication Sig Dispense Refill    Ascorbic Acid (VITAMIN C) 500 MG CAPS Take 1 capsule by mouth daily      Calcium 500-125 MG-UNIT TABS Take 1 tablet by mouth daily      Cholecalciferol (VITAMIN D3) 1000 units CAPS Take by mouth      COD LIVER OIL PO Take by mouth      Glucosamine-Chondroit-Vit C-Mn (GLUCOSAMINE 1500 COMPLEX) CAPS Take by mouth      Kelp 150 MCG TABS Take by mouth      Multiple Vitamin (MULTIVITAMIN) tablet Take 1 tablet by mouth daily      Omega-3 Fatty Acids (OMEGA-3 FISH OIL) 1200 MG CAPS Take by mouth      Probiotic Product (PROBIOTIC-10) CAPS Take by mouth      RESTASIS 0 05 % ophthalmic emulsion        No current facility-administered medications for this visit        No Known Allergies   Immunizations:     Immunization History   Administered Date(s) Administered    Influenza Split High Dose Preservative Free IM 10/08/2016, 09/22/2017    Influenza, high dose seasonal 0 5 mL 09/10/2019    Pneumococcal Conjugate 13-Valent 05/06/2019    Pneumococcal Polysaccharide PPV23 07/23/2010    TD (adult) Preservative Free 07/23/2013    Zoster 04/24/2014      Health Maintenance:         Topic Date Due    DXA SCAN  08/21/2020         Topic Date Due    Influenza Vaccine  07/01/2020      Medicare Health Risk Assessment:     /80   Pulse 59   Temp 98 °F (36 7 °C) (Temporal)   Resp 16   Ht 5' (1 524 m)   Wt 56 2 kg (124 lb)   SpO2 98%   BMI 24 22 kg/m²          Health Risk Assessment:   Patient rates overall health as good  Patient feels that their physical health rating is same  Eyesight was rated as same  Hearing was rated as same  Patient feels that their emotional and mental health rating is same  Pain experienced in the last 7 days has been none  Patient states that she has experienced no weight loss or gain in last 6 months  Depression Screening:   PHQ-2 Score: 0      Fall Risk Screening: In the past year, patient has experienced: history of falling in past year    Number of falls: 1    Urinary Incontinence Screening:   Patient has not leaked urine accidently in the last six months  Home Safety:  Patient does not have trouble with stairs inside or outside of their home  Patient has working smoke alarms and has no working carbon monoxide detector  Home safety hazards include: none  Nutrition:   Current diet is Regular  Medications:   Patient is currently taking over-the-counter supplements  OTC medications include: see medication list  Patient is able to manage medications  Activities of Daily Living (ADLs)/Instrumental Activities of Daily Living (IADLs):   Walk and transfer into and out of bed and chair?: Yes  Dress and groom yourself?: Yes    Bathe or shower yourself?: Yes    Feed yourself?  Yes  Do your laundry/housekeeping?: Yes  Manage your money, pay your bills and track your expenses?: Yes  Do your own shopping?: Yes    Previous Hospitalizations:   Any hospitalizations or ED visits within the last 12 months?: Yes    How many hospitalizations have you had in the last year?: 1-2    Advance Care Planning:   Living will: Yes    Durable POA for healthcare:  Yes    Advanced directive: Yes      Cognitive Screening:   Provider or family/friend/caregiver concerned regarding cognition?: No    PREVENTIVE SCREENINGS      Cardiovascular Screening:    General: Screening Current      Diabetes Screening:     General: Screening Current      Colorectal Cancer Screening:     General: History Colorectal Cancer      Breast Cancer Screening:     General: Screening Current      Cervical Cancer Screening:    General: Screening Not Indicated      Osteoporosis Screening:    General: Screening Current      Lung Cancer Screening:     General: Screening Not Indicated      Enrrique De Leon DO

## 2020-08-15 PROBLEM — L98.9 SKIN LESION: Status: ACTIVE | Noted: 2020-08-15

## 2020-08-15 PROBLEM — I87.2 VENOUS INSUFFICIENCY: Status: ACTIVE | Noted: 2020-08-15

## 2020-09-02 ENCOUNTER — TELEPHONE (OUTPATIENT)
Dept: INTERNAL MEDICINE CLINIC | Facility: CLINIC | Age: 80
End: 2020-09-02

## 2020-09-21 ENCOUNTER — OFFICE VISIT (OUTPATIENT)
Dept: PHYSICAL THERAPY | Facility: CLINIC | Age: 80
End: 2020-09-21
Payer: COMMERCIAL

## 2020-09-21 DIAGNOSIS — H81.12 BPPV (BENIGN PAROXYSMAL POSITIONAL VERTIGO), LEFT: Primary | ICD-10-CM

## 2020-09-21 PROCEDURE — 97162 PT EVAL MOD COMPLEX 30 MIN: CPT | Performed by: PHYSICAL THERAPIST

## 2020-09-21 NOTE — PROGRESS NOTES
PT Evaluation     Today's date: 2020  Patient name: Monik Potts  : 1940  MRN: [id number]  Referring provider: Lizz Aponte DO  Dx:   Encounter Diagnosis     ICD-10-CM    1  BPPV (benign paroxysmal positional vertigo), left  H81 12        Start Time: 1010  Stop Time: 1045  Total time in clinic (min): 35 minutes    Assessment  Assessment details: Monik Potts is a 78 y o  female who presents to the clinic with complaints of vertigo  The patient's signs and symptoms are consistent with a left sided BPPV  The patient presents with the above listed impairments  She was positive for a [place]-Hallpike on the left and was able to tolerate two CRTs with decreasing symptoms during the second maneuver  She is eager to reduce her vertigo complaints and should benefit from skilled physical therapy  Impairments: activity intolerance, lacks appropriate home exercise program and safety issue  Understanding of Dx/Px/POC: good   Prognosis: good    Goals  Impairment Goals:  1  Patient will decrease complaints of vertigo by 25% at worst in 2 weeks  2  Patient will decrease complaints of vertigo by 75% at worst in 4 weeks    Functional Goals:  1  Patient will be independent with HEP by discharge  2  Patient will increase FOTO to average norms by discharge  3    Patient will be able to lay back in bed with decreased complaints of vertigo in 4 weeks         Plan  Patient would benefit from: skilled physical therapy  Planned modality interventions: cryotherapy, thermotherapy: hydrocollator packs and TENS  Planned therapy interventions: home exercise program, graded exercise, functional ROM exercises, flexibility, body mechanics training, postural training, patient education, therapeutic activities, therapeutic exercise, manual therapy, joint mobilization and neuromuscular re-education  Frequency: 2x week (1-2x / week)  Duration in weeks: 4  Treatment plan discussed with: patient        Subjective Evaluation    History of Present Illness  Mechanism of injury: HPI:  Patient reports that 1-2 weeks ago her vertigo symptoms started back up  She notes that most of her symptoms occur when she lays back in bed toward her left side  She was treated for vertigo over 1 year ago with good success  Pain Location:  No pain reported   Occupation:  Retired  Prior Functional Limitations:   Independent prior   AGG:  Laying back in bed   Ease:  Rest, Staying still   Patient Goals:  "I want the vertigo go away"     Pain  No pain reported          Objective     Functional Assessment        Comments  Smooth Pursuit:  (-)  Convergence:  (-)  Saccades:  (-)  Head Thrust:  (-)  Freda-Hallpike:  (+) on L    Shoulder and Cervical ROM WNL  Shoulder strength WNL bilaterally       Flowsheet Rows      Most Recent Value   PT/OT G-Codes   Current Score  97   Projected Score  95              Diagnosis:    Precautions:    Manuals 9/21       PROM        Mobs                                There Ex                                                        Neruo Re-Ed        CRT x2       VORx1        VORx2                                                                                               Ther Act                                         Modalities             CP PRN

## 2020-10-14 ENCOUNTER — HOSPITAL ENCOUNTER (OUTPATIENT)
Dept: RADIOLOGY | Age: 80
Discharge: HOME | End: 2020-10-14
Payer: COMMERCIAL

## 2020-10-14 VITALS — WEIGHT: 124 LBS | HEIGHT: 60 IN | BODY MASS INDEX: 24.35 KG/M2

## 2020-10-14 DIAGNOSIS — Z12.31 ENCOUNTER FOR SCREENING MAMMOGRAM FOR MALIGNANT NEOPLASM OF BREAST: ICD-10-CM

## 2020-10-14 PROCEDURE — 77063 BREAST TOMOSYNTHESIS BI: CPT

## 2020-10-14 PROCEDURE — 77067 SCR MAMMO BI INCL CAD: CPT

## 2020-11-13 NOTE — PROGRESS NOTES
PT Discharge    Today's date: 2020  Patient name: Jozef Ewing  : 1940  MRN: [id number]  Referring provider: Feliciano Archuleta DO  Dx:   Encounter Diagnosis     ICD-10-CM    1  BPPV (benign paroxysmal positional vertigo), left  H81 12 PT plan of care cert/re-cert     Patient has not returned for any follow-up appointments for her BPPV  She will be discharged at this time  If Megan were to need physical therapy in the future, we would be happy to share in her care

## 2020-11-30 ENCOUNTER — OFFICE VISIT (OUTPATIENT)
Dept: INTERNAL MEDICINE CLINIC | Facility: CLINIC | Age: 80
End: 2020-11-30
Payer: COMMERCIAL

## 2020-11-30 VITALS
HEIGHT: 60 IN | BODY MASS INDEX: 24.35 KG/M2 | WEIGHT: 124 LBS | TEMPERATURE: 97.6 F | DIASTOLIC BLOOD PRESSURE: 90 MMHG | HEART RATE: 72 BPM | SYSTOLIC BLOOD PRESSURE: 120 MMHG | OXYGEN SATURATION: 98 %

## 2020-11-30 DIAGNOSIS — E03.9 HYPOTHYROIDISM, UNSPECIFIED TYPE: Primary | ICD-10-CM

## 2020-11-30 DIAGNOSIS — I87.2 VENOUS INSUFFICIENCY: ICD-10-CM

## 2020-11-30 DIAGNOSIS — R73.03 PREDIABETES: ICD-10-CM

## 2020-11-30 DIAGNOSIS — L98.9 SKIN LESION: ICD-10-CM

## 2020-11-30 DIAGNOSIS — Z12.31 BREAST CANCER SCREENING BY MAMMOGRAM: ICD-10-CM

## 2020-11-30 DIAGNOSIS — H81.12 BENIGN POSITIONAL VERTIGO, LEFT: ICD-10-CM

## 2020-11-30 DIAGNOSIS — M85.80 OSTEOPENIA, UNSPECIFIED LOCATION: ICD-10-CM

## 2020-11-30 DIAGNOSIS — N39.498 OTHER URINARY INCONTINENCE: ICD-10-CM

## 2020-11-30 PROCEDURE — 99214 OFFICE O/P EST MOD 30 MIN: CPT | Performed by: INTERNAL MEDICINE

## 2020-11-30 PROCEDURE — T1015 CLINIC SERVICE: HCPCS | Performed by: INTERNAL MEDICINE

## 2020-11-30 RX ORDER — ZINC GLUCONATE 50 MG
50 TABLET ORAL DAILY
COMMUNITY

## 2021-01-20 DIAGNOSIS — Z23 ENCOUNTER FOR IMMUNIZATION: ICD-10-CM

## 2021-01-21 ENCOUNTER — OFFICE VISIT (OUTPATIENT)
Dept: PODIATRY | Facility: CLINIC | Age: 81
End: 2021-01-21
Payer: COMMERCIAL

## 2021-01-21 VITALS — HEIGHT: 60 IN | BODY MASS INDEX: 24.54 KG/M2 | WEIGHT: 125 LBS

## 2021-01-21 DIAGNOSIS — L60.0 INGROWN TOENAIL: Primary | ICD-10-CM

## 2021-01-21 DIAGNOSIS — M20.11 VALGUS DEFORMITY OF BOTH GREAT TOES: ICD-10-CM

## 2021-01-21 DIAGNOSIS — M79.674 PAIN IN TOE OF RIGHT FOOT: ICD-10-CM

## 2021-01-21 DIAGNOSIS — M20.12 VALGUS DEFORMITY OF BOTH GREAT TOES: ICD-10-CM

## 2021-01-21 PROCEDURE — 99202 OFFICE O/P NEW SF 15 MIN: CPT | Performed by: PODIATRIST

## 2021-01-21 PROCEDURE — 1160F RVW MEDS BY RX/DR IN RCRD: CPT | Performed by: PODIATRIST

## 2021-01-21 PROCEDURE — 1036F TOBACCO NON-USER: CPT | Performed by: PODIATRIST

## 2021-01-21 NOTE — PROGRESS NOTES
Assessment/Plan:  Explained to patient that she has a pincer toenail with ingrown nails along the medial and lateral nail borders  Discussed treatment options contrasting partial matrixectomy along both borders with total nail avulsion and total matrixectomy  Patient prefers to have the ingrown nails removed understanding that the toenail will be very narrow  Patient prefers to schedule this towards the spring time  She will schedule in April  No treatment needed for the asymptomatic bunions  No problem-specific Assessment & Plan notes found for this encounter  Diagnoses and all orders for this visit:    Ingrown toenail    Pain in toe of right foot    Valgus deformity of both great toes          Subjective:      Patient ID: Geraldo Lemos is a 78 y o  female  HPI     Patient, an 78-year-old female in apparent good health presents to discuss her right great toenail  The toenail is intermittently painful with shoe pressure along both the medial and lateral nail border  The toenail appears to be a pincer nail  Patient had a similar problem with her left great toenail that was treated surgically with the lateral nail border removed approximately 40 years ago  Patient has bilateral bunions but they are asymptomatic  The following portions of the patient's history were reviewed and updated as appropriate: allergies, current medications, past family history, past medical history, past social history, past surgical history and problem list     Review of Systems   Constitutional: Negative  Cardiovascular: Negative  Gastrointestinal: Negative  Musculoskeletal: Negative  Neurological: Negative  Objective:      Ht 5' (1 524 m)   Wt 56 7 kg (125 lb)   BMI 24 41 kg/m²          Physical Exam  Constitutional:       Appearance: Normal appearance  Cardiovascular:      Pulses: Normal pulses        Comments: Temperature and turgor within normal limits bilateral  Musculoskeletal: General: Deformity present  Comments: Hallux valgus bilateral   The condition is severe but the bunions are asymptomatic  Skin:     Comments: Right great toenail is pincer like and dystrophic  It is painful along the medial and lateral nail borders  Neurological:      General: No focal deficit present  Mental Status: She is oriented to person, place, and time

## 2021-02-13 ENCOUNTER — IMMUNIZATIONS (OUTPATIENT)
Dept: FAMILY MEDICINE CLINIC | Facility: HOSPITAL | Age: 81
End: 2021-02-13

## 2021-02-13 DIAGNOSIS — Z23 ENCOUNTER FOR IMMUNIZATION: Primary | ICD-10-CM

## 2021-02-13 PROCEDURE — 0001A SARS-COV-2 / COVID-19 MRNA VACCINE (PFIZER-BIONTECH) 30 MCG: CPT

## 2021-02-13 PROCEDURE — 91300 SARS-COV-2 / COVID-19 MRNA VACCINE (PFIZER-BIONTECH) 30 MCG: CPT

## 2021-03-04 ENCOUNTER — IMMUNIZATIONS (OUTPATIENT)
Dept: FAMILY MEDICINE CLINIC | Facility: HOSPITAL | Age: 81
End: 2021-03-04

## 2021-03-04 DIAGNOSIS — Z23 ENCOUNTER FOR IMMUNIZATION: Primary | ICD-10-CM

## 2021-03-04 PROCEDURE — 0002A SARS-COV-2 / COVID-19 MRNA VACCINE (PFIZER-BIONTECH) 30 MCG: CPT

## 2021-03-04 PROCEDURE — 91300 SARS-COV-2 / COVID-19 MRNA VACCINE (PFIZER-BIONTECH) 30 MCG: CPT

## 2021-03-30 NOTE — PROGRESS NOTES
Assessment & Plan:     E03 9  Hypothyroidism, unspecified type  I have evaluated the patient and found the condition to be: Resolved    M85 80  Osteopenia, unspecified site  I have evaluated the patient and found the condition to be: Stable  The patient should continue treatment and follow-up with: calcium    I87 2  Venous insufficiency   I have evaluated the patient and found the condition to be: Stable  I have evaluated the patient and: Recommended continue with same treatment plan  The patient should continue treatment and follow-up with: using compression stocking     R73 03  Prediabetes   I have evaluated the patient and found the condition to be: Stable  I have evaluated the patient and: Recommended continue with same treatment plan  The patient should continue treatment and follow-up with: monitoring a1c will go to the gym    N39 498  Other urinary incontinence   I have evaluated the patient and found the condition to be: Stable  The patient should continue treatment and follow-up with: will be seeing urology         Subjective:     Patient ID: Andrew Hma is a 78 y o  female     No chief complaint on file  HPI    Review of Systems    Objective: There were no vitals taken for this visit      Problem List Items Addressed This Visit     None          Physical Exam

## 2021-04-02 ENCOUNTER — APPOINTMENT (OUTPATIENT)
Dept: LAB | Facility: CLINIC | Age: 81
End: 2021-04-02
Payer: COMMERCIAL

## 2021-04-02 DIAGNOSIS — E03.9 HYPOTHYROIDISM, UNSPECIFIED TYPE: ICD-10-CM

## 2021-04-02 DIAGNOSIS — R73.03 PREDIABETES: ICD-10-CM

## 2021-04-02 LAB
ALBUMIN SERPL BCP-MCNC: 3.8 G/DL (ref 3.5–5)
ALP SERPL-CCNC: 77 U/L (ref 46–116)
ALT SERPL W P-5'-P-CCNC: 31 U/L (ref 12–78)
ANION GAP SERPL CALCULATED.3IONS-SCNC: 10 MMOL/L (ref 4–13)
AST SERPL W P-5'-P-CCNC: 21 U/L (ref 5–45)
BASOPHILS # BLD AUTO: 0.05 THOUSANDS/ΜL (ref 0–0.1)
BASOPHILS NFR BLD AUTO: 1 % (ref 0–1)
BILIRUB SERPL-MCNC: 0.63 MG/DL (ref 0.2–1)
BUN SERPL-MCNC: 17 MG/DL (ref 5–25)
CALCIUM SERPL-MCNC: 9.4 MG/DL (ref 8.3–10.1)
CHLORIDE SERPL-SCNC: 105 MMOL/L (ref 100–108)
CHOLEST SERPL-MCNC: 199 MG/DL (ref 50–200)
CO2 SERPL-SCNC: 29 MMOL/L (ref 21–32)
CREAT SERPL-MCNC: 0.7 MG/DL (ref 0.6–1.3)
EOSINOPHIL # BLD AUTO: 0.17 THOUSAND/ΜL (ref 0–0.61)
EOSINOPHIL NFR BLD AUTO: 3 % (ref 0–6)
ERYTHROCYTE [DISTWIDTH] IN BLOOD BY AUTOMATED COUNT: 14.3 % (ref 11.6–15.1)
GFR SERPL CREATININE-BSD FRML MDRD: 81 ML/MIN/1.73SQ M
GLUCOSE P FAST SERPL-MCNC: 79 MG/DL (ref 65–99)
HCT VFR BLD AUTO: 43.1 % (ref 34.8–46.1)
HDLC SERPL-MCNC: 105 MG/DL
HGB BLD-MCNC: 13.9 G/DL (ref 11.5–15.4)
IMM GRANULOCYTES # BLD AUTO: 0.01 THOUSAND/UL (ref 0–0.2)
IMM GRANULOCYTES NFR BLD AUTO: 0 % (ref 0–2)
LDLC SERPL CALC-MCNC: 82 MG/DL (ref 0–100)
LYMPHOCYTES # BLD AUTO: 1.38 THOUSANDS/ΜL (ref 0.6–4.47)
LYMPHOCYTES NFR BLD AUTO: 26 % (ref 14–44)
MCH RBC QN AUTO: 28.8 PG (ref 26.8–34.3)
MCHC RBC AUTO-ENTMCNC: 32.3 G/DL (ref 31.4–37.4)
MCV RBC AUTO: 89 FL (ref 82–98)
MONOCYTES # BLD AUTO: 0.38 THOUSAND/ΜL (ref 0.17–1.22)
MONOCYTES NFR BLD AUTO: 7 % (ref 4–12)
NEUTROPHILS # BLD AUTO: 3.26 THOUSANDS/ΜL (ref 1.85–7.62)
NEUTS SEG NFR BLD AUTO: 63 % (ref 43–75)
NRBC BLD AUTO-RTO: 0 /100 WBCS
PLATELET # BLD AUTO: 232 THOUSANDS/UL (ref 149–390)
PMV BLD AUTO: 11.6 FL (ref 8.9–12.7)
POTASSIUM SERPL-SCNC: 4.5 MMOL/L (ref 3.5–5.3)
PROT SERPL-MCNC: 7.8 G/DL (ref 6.4–8.2)
RBC # BLD AUTO: 4.83 MILLION/UL (ref 3.81–5.12)
SODIUM SERPL-SCNC: 144 MMOL/L (ref 136–145)
TRIGL SERPL-MCNC: 59 MG/DL
TSH SERPL DL<=0.05 MIU/L-ACNC: 0.56 UIU/ML (ref 0.36–3.74)
WBC # BLD AUTO: 5.25 THOUSAND/UL (ref 4.31–10.16)

## 2021-04-02 PROCEDURE — 80061 LIPID PANEL: CPT

## 2021-04-02 PROCEDURE — 84443 ASSAY THYROID STIM HORMONE: CPT

## 2021-04-02 PROCEDURE — 36415 COLL VENOUS BLD VENIPUNCTURE: CPT

## 2021-04-02 PROCEDURE — 80053 COMPREHEN METABOLIC PANEL: CPT

## 2021-04-02 PROCEDURE — 85025 COMPLETE CBC W/AUTO DIFF WBC: CPT

## 2021-04-06 ENCOUNTER — OFFICE VISIT (OUTPATIENT)
Dept: INTERNAL MEDICINE CLINIC | Facility: CLINIC | Age: 81
End: 2021-04-06
Payer: COMMERCIAL

## 2021-04-06 VITALS
DIASTOLIC BLOOD PRESSURE: 82 MMHG | RESPIRATION RATE: 16 BRPM | OXYGEN SATURATION: 99 % | BODY MASS INDEX: 24.41 KG/M2 | HEIGHT: 60 IN | HEART RATE: 62 BPM | SYSTOLIC BLOOD PRESSURE: 118 MMHG

## 2021-04-06 DIAGNOSIS — R32 URINARY INCONTINENCE, UNSPECIFIED TYPE: ICD-10-CM

## 2021-04-06 DIAGNOSIS — R73.03 PREDIABETES: Primary | ICD-10-CM

## 2021-04-06 DIAGNOSIS — M85.80 OSTEOPENIA, UNSPECIFIED LOCATION: ICD-10-CM

## 2021-04-06 DIAGNOSIS — Z13.6 SCREENING FOR CARDIOVASCULAR CONDITION: ICD-10-CM

## 2021-04-06 PROCEDURE — 3725F SCREEN DEPRESSION PERFORMED: CPT | Performed by: INTERNAL MEDICINE

## 2021-04-06 PROCEDURE — 1036F TOBACCO NON-USER: CPT | Performed by: INTERNAL MEDICINE

## 2021-04-06 PROCEDURE — 1160F RVW MEDS BY RX/DR IN RCRD: CPT | Performed by: INTERNAL MEDICINE

## 2021-04-06 PROCEDURE — 99213 OFFICE O/P EST LOW 20 MIN: CPT | Performed by: INTERNAL MEDICINE

## 2021-04-06 PROCEDURE — T1015 CLINIC SERVICE: HCPCS | Performed by: INTERNAL MEDICINE

## 2021-04-06 NOTE — PATIENT INSTRUCTIONS
Please consider Shingrix vaccine in the next 6 months please obtain this vaccine through her local pharmacy    Please go for the vitamin-D level and also A1c June 2021 orders are already active in her chart you may go to the laboratory for this

## 2021-04-06 NOTE — PROGRESS NOTES
Assessment/Plan:    Prediabetes   Pre diabetes -I have counseled the patient to follow a healthy balanced diet, I have counseled patient reduce carbohydrates and sweets in the diet, I would like the patient exercise routinely  I will be checking hemoglobin A1c and comprehensive metabolic panel  Have counseled patient about the prevention of diabetes, and the risk of progression to type 2 diabetes  Osteopenia    Continue calcium plus vitamin-D and weight-bearing exercises    Screening for cardiovascular condition   I have counselled the pt to follow a healthy and balanced diet ,and recommend routine exercise  Urine incontinence   Patient will follow-up with uro gyn doctor Duy Adaem         Problem List Items Addressed This Visit        Musculoskeletal and Integument    Osteopenia       Continue calcium plus vitamin-D and weight-bearing exercises            Other    Prediabetes - Primary      Pre diabetes -I have counseled the patient to follow a healthy balanced diet, I have counseled patient reduce carbohydrates and sweets in the diet, I would like the patient exercise routinely  I will be checking hemoglobin A1c and comprehensive metabolic panel  Have counseled patient about the prevention of diabetes, and the risk of progression to type 2 diabetes  Relevant Orders    Comprehensive metabolic panel    Hemoglobin A1C    Lipid Panel with Direct LDL reflex    Screening for cardiovascular condition      I have counselled the pt to follow a healthy and balanced diet ,and recommend routine exercise  Relevant Orders    Lipid Panel with Direct LDL reflex    Urine incontinence      Patient will follow-up with uro gyn doctor Jose C Holland in 6 months call if any problems    Subjective:      Patient ID: Kiara Mata is a 78 y o  female  HPI 78-year old female coming in for a follow up office visit regarding prediabetes, osteopenia;  The patient reports me compliant taking medications without untoward side effects the  The patient is here to review his medical condition, update me on the medical condition and the patient reports me no hospitalizations and no ER visits  She does report me ongoing urinary continence she will be seeing uro gyn doctor Carleen Lombard, reports me trying her best to follow healthy diet although she has gained some weight with the COVID crisis plans to become more active with this pain summer months  She reports me overall is feeling well she is here to review her laboratories in detail  The following portions of the patient's history were reviewed and updated as appropriate: allergies, current medications, past family history, past medical history, past social history, past surgical history and problem list     Review of Systems   Constitutional: Negative for activity change, appetite change and unexpected weight change  HENT: Negative for congestion and postnasal drip  Eyes: Negative for visual disturbance  Respiratory: Negative for cough and shortness of breath  Cardiovascular: Negative for chest pain  Gastrointestinal: Negative for abdominal pain, diarrhea, nausea and vomiting  Neurological: Negative for dizziness, light-headedness and headaches  Hematological: Negative for adenopathy  Objective:    No follow-ups on file  No results found        No Known Allergies    Past Medical History:   Diagnosis Date    Arthritis     Colon cancer ([facility], [place] [address] )     age 45     Past Surgical History:   Procedure Laterality Date     SECTION      COLON SURGERY      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    TOTAL ABDOMINAL HYSTERECTOMY Bilateral     age [address]   [address] [place] [address] TOTAL ABDOMINAL HYSTERECTOMY W/ BILATERAL SALPINGOOPHORECTOMY Bilateral     fibroids; age [address]     Current Outpatient Medications on File Prior to Visit   Medication Sig Dispense Refill    Ascorbic Acid (VITAMIN C) 500 MG CAPS Take 1 capsule by mouth daily      Calcium 500-125 MG-UNIT TABS Take 1 tablet by mouth daily      Cholecalciferol (VITAMIN D3) 1000 units CAPS Take by mouth      COD LIVER OIL PO Take by mouth      Glucosamine-Chondroit-Vit C-Mn (GLUCOSAMINE 1500 COMPLEX) CAPS Take by mouth      Kelp 150 MCG TABS Take by mouth      Multiple Vitamin (MULTIVITAMIN) tablet Take 1 tablet by mouth daily      Omega-3 Fatty Acids (OMEGA-3 FISH OIL) 1200 MG CAPS Take by mouth      Probiotic Product (PROBIOTIC-10) CAPS Take by mouth      RESTASIS 0 05 % ophthalmic emulsion       zinc gluconate 50 mg tablet Take 50 mg by mouth daily       No current facility-administered medications on file prior to visit  Family History   Problem Relation Age of Onset    No Known Problems Mother     No Known Problems Father     Cancer Family     Lung cancer Sister 80    Colon cancer Sister 79    Breast cancer Sister 61    No Known Problems Daughter     No Known Problems Maternal Aunt     No Known Problems Maternal Grandmother     No Known Problems Maternal Grandfather     No Known Problems Paternal Grandmother     No Known Problems Paternal Grandfather      Social History     Socioeconomic History    Marital status:       Spouse name: Not on file    Number of children: Not on file    Years of education: Not on file    Highest education level: Not on file   Occupational History    Not on file   Social Needs    Financial resource strain: Not on file    Food insecurity     Worry: Not on file     Inability: Not on file    Transportation needs     Medical: Not on file     Non-medical: Not on file   Tobacco Use    Smoking status: Former Smoker    Smokeless tobacco: Never Used   Substance and Sexual Activity    Alcohol use: No    Drug use: No    Sexual activity: Never   Lifestyle    Physical activity     Days per week: Not on file     Minutes per session: Not on file    Stress: Not on file   Relationships    Social connections     Talks on phone: Not on file     Gets together: Not on file     Attends Mu-ism service: Not on file     Active member of club or organization: Not on file     Attends meetings of clubs or organizations: Not on file     Relationship status: Not on file    Intimate partner violence     Fear of current or ex partner: Not on file     Emotionally abused: Not on file     Physically abused: Not on file     Forced sexual activity: Not on file   Other Topics Concern    Not on file   Social History Narrative    Not on file     Vitals:    04/06/21 1039   BP: 118/82   Pulse: 62   Resp: 16   SpO2: 99%   Height: 5' (1 524 m)     Results for orders placed or performed in visit on 04/02/21   CBC and differential   Result Value Ref Range    WBC 5 25 4 31 - 10 16 Thousand/uL    RBC 4 83 3 81 - 5 12 Million/uL    Hemoglobin 13 9 11 5 - 15 4 g/dL    Hematocrit 43 1 34 8 - 46 1 %    MCV 89 82 - 98 fL    MCH 28 8 26 8 - 34 3 pg    MCHC 32 3 31 4 - 37 4 g/dL    RDW 14 3 11 6 - 15 1 %    MPV 11 6 8 9 - 12 7 fL    Platelets 286 089 - 855 Thousands/uL    nRBC 0 /100 WBCs    Neutrophils Relative 63 43 - 75 %    Immat GRANS % 0 0 - 2 %    Lymphocytes Relative 26 14 - 44 %    Monocytes Relative 7 4 - 12 %    Eosinophils Relative 3 0 - 6 %    Basophils Relative 1 0 - 1 %    Neutrophils Absolute 3 26 1 85 - 7 62 Thousands/µL    Immature Grans Absolute 0 01 0 00 - 0 20 Thousand/uL    Lymphocytes Absolute 1 38 0 60 - 4 47 Thousands/µL    Monocytes Absolute 0 38 0 17 - 1 22 Thousand/µL    Eosinophils Absolute 0 17 0 00 - 0 61 Thousand/µL    Basophils Absolute 0 05 0 00 - 0 10 Thousands/µL   Comprehensive metabolic panel   Result Value Ref Range    Sodium 144 136 - 145 mmol/L    Potassium 4 5 3 5 - 5 3 mmol/L    Chloride 105 100 - 108 mmol/L    CO2 29 21 - 32 mmol/L    ANION GAP 10 4 - 13 mmol/L    BUN 17 5 - 25 mg/dL    Creatinine 0 70 0 60 - 1 30 mg/dL    Glucose, Fasting 79 65 - 99 mg/dL    Calcium 9 4 8 3 - 10 1 mg/dL    AST 21 5 - 45 U/L    ALT 31 12 - 78 U/L    Alkaline Phosphatase 77 46 - 116 U/L    Total Protein 7 8 6 4 - 8 2 g/dL    Albumin 3 8 3 5 - 5 0 g/dL    Total Bilirubin 0 63 0 20 - 1 00 mg/dL    eGFR 81 ml/min/1 73sq m   Lipid Panel with Direct LDL reflex   Result Value Ref Range    Cholesterol 199 50 - 200 mg/dL    Triglycerides 59 <=150 mg/dL    HDL, Direct 105 >=40 mg/dL    LDL Calculated 82 0 - 100 mg/dL   TSH, 3rd generation   Result Value Ref Range    TSH 3RD GENERATON 0 559 0 358 - 3 740 uIU/mL     Weight (last 2 days)     None        Body mass index is 24 41 kg/m²  BP      Temp      Pulse     Resp      SpO2      There were no vitals filed for this visit  There were no vitals filed for this visit  /82   Pulse 62   Resp 16   Ht 5' (1 524 m)   SpO2 99%   BMI 24 41 kg/m²          Physical Exam  Constitutional:       Appearance: She is well-developed  HENT:      Head: Normocephalic  Eyes:      General: No scleral icterus  Right eye: No discharge  Left eye: No discharge  Conjunctiva/sclera: Conjunctivae normal       Pupils: Pupils are equal, round, and reactive to light  Neck:      Musculoskeletal: Neck supple  Cardiovascular:      Rate and Rhythm: Normal rate and regular rhythm  Heart sounds: Normal heart sounds  No murmur  No friction rub  No gallop  Pulmonary:      Effort: No respiratory distress  Breath sounds: Normal breath sounds  No wheezing or rales  Abdominal:      General: Bowel sounds are normal  There is no distension  Palpations: Abdomen is soft  There is no mass  Tenderness: There is no abdominal tenderness  There is no guarding or rebound  Musculoskeletal:         General: No deformity  Lymphadenopathy:      Cervical: No cervical adenopathy  Neurological:      Mental Status: She is alert        Coordination: Coordination normal

## 2021-04-19 ENCOUNTER — TELEPHONE (OUTPATIENT)
Dept: OBGYN CLINIC | Facility: HOSPITAL | Age: 81
End: 2021-04-19

## 2021-04-19 ENCOUNTER — OFFICE VISIT (OUTPATIENT)
Dept: PODIATRY | Facility: CLINIC | Age: 81
End: 2021-04-19
Payer: COMMERCIAL

## 2021-04-19 VITALS
HEART RATE: 58 BPM | DIASTOLIC BLOOD PRESSURE: 98 MMHG | SYSTOLIC BLOOD PRESSURE: 145 MMHG | BODY MASS INDEX: 24.41 KG/M2 | HEIGHT: 60 IN

## 2021-04-19 DIAGNOSIS — M79.674 PAIN IN TOE OF RIGHT FOOT: ICD-10-CM

## 2021-04-19 DIAGNOSIS — L60.0 INGROWN TOENAIL: Primary | ICD-10-CM

## 2021-04-19 PROCEDURE — 11750 EXCISION NAIL&NAIL MATRIX: CPT | Performed by: PODIATRIST

## 2021-04-19 RX ORDER — LIDOCAINE HYDROCHLORIDE AND EPINEPHRINE 10; 10 MG/ML; UG/ML
1 INJECTION, SOLUTION INFILTRATION; PERINEURAL ONCE
Status: COMPLETED | OUTPATIENT
Start: 2021-04-19 | End: 2021-04-19

## 2021-04-19 RX ORDER — LIDOCAINE HYDROCHLORIDE 10 MG/ML
1 INJECTION, SOLUTION EPIDURAL; INFILTRATION; INTRACAUDAL; PERINEURAL ONCE
Status: COMPLETED | OUTPATIENT
Start: 2021-04-19 | End: 2021-04-19

## 2021-04-19 RX ADMIN — LIDOCAINE HYDROCHLORIDE AND EPINEPHRINE 1 ML: 10; 10 INJECTION, SOLUTION INFILTRATION; PERINEURAL at 11:39

## 2021-04-19 RX ADMIN — LIDOCAINE HYDROCHLORIDE 1 ML: 10 INJECTION, SOLUTION EPIDURAL; INFILTRATION; INTRACAUDAL; PERINEURAL at 11:38

## 2021-04-19 NOTE — TELEPHONE ENCOUNTER
Dr Barkley Sites    [phone]    Patient just had an in grown toe nail removed, can she wear regular shoes?

## 2021-04-19 NOTE — PROGRESS NOTES
Patient presents for partial matrixectomy right great toe along the medial nail border  She does not feel pain along the lateral nail border at this time  Discussed treatment options recommending partial matrixectomy  The goal of this procedure is permanent   Elimination of the offending nail border  Procedure performed as follows: Anesthesia via 4 cc of a 1:1 mixture of 1 percent xylocaine with epinephrine and 1 percent xylocaine plain  Betadine prep was performed  The medial nail border of the right great toe was avulsed to the eponychium  A partial matrixectomy was performed utilizing phenol in a standard manner  A bacitracin dressing was applied  Patient is to soak in warm water twice a day tomorrow followed by a Neosporin dressing  Nail removal    Date/Time: 4/19/2021 12:05 PM  Performed by: Paul Moran DPM  Authorized by: Paul Moran DPM     Patient location:  ClinicUniversal Protocol:  Consent: Verbal consent obtained  Risks and benefits: risks, benefits and alternatives were discussed  Consent given by: patient  Patient understanding: patient states understanding of the procedure being performed  Patient identity confirmed: verbally with patient      Location:     Foot:  R big toe  Pre-procedure details:     Skin preparation:  Betadine  Anesthesia (see MAR for exact dosages): Anesthesia method:  Nerve block    Block anesthetic:  Lidocaine 1% WITH epi and lidocaine 1% w/o epi  Nail Removal:     Nail removed:  Partial    Nail side:  Medial    Nail bed sutured: no    Ingrown nail:     Wedge excision of skin: no      Nail matrix removed or ablated:  Partial  Post-procedure details:     Dressing:  4x4 sterile gauze, antibiotic ointment and gauze roll    Patient tolerance of procedure:   Tolerated well, no immediate complications

## 2021-04-21 ENCOUNTER — TELEPHONE (OUTPATIENT)
Dept: PODIATRY | Facility: CLINIC | Age: 81
End: 2021-04-21

## 2021-04-27 ENCOUNTER — OFFICE VISIT (OUTPATIENT)
Dept: PODIATRY | Facility: CLINIC | Age: 81
End: 2021-04-27

## 2021-04-27 VITALS
SYSTOLIC BLOOD PRESSURE: 173 MMHG | HEART RATE: 67 BPM | BODY MASS INDEX: 24.94 KG/M2 | DIASTOLIC BLOOD PRESSURE: 92 MMHG | HEIGHT: 60 IN | WEIGHT: 127 LBS

## 2021-04-27 DIAGNOSIS — L60.0 INGROWN TOENAIL: Primary | ICD-10-CM

## 2021-04-27 PROCEDURE — 99024 POSTOP FOLLOW-UP VISIT: CPT | Performed by: PODIATRIST

## 2021-05-05 ENCOUNTER — TELEPHONE (OUTPATIENT)
Dept: INTERNAL MEDICINE CLINIC | Facility: CLINIC | Age: 81
End: 2021-05-05

## 2021-05-06 ENCOUNTER — TELEPHONE (OUTPATIENT)
Dept: INTERNAL MEDICINE CLINIC | Facility: CLINIC | Age: 81
End: 2021-05-06

## 2021-05-06 NOTE — TELEPHONE ENCOUNTER
Patient 2nd covid shot was given to her on 3/4/21 and she is feeling her arthritis is all over for the past 2 days, arm is inflamed  Body is sore  Not taking anything for it   Please adive

## 2021-05-07 NOTE — TELEPHONE ENCOUNTER
The patient is feeling much better but her arm is still red  She said she felt like she doesn't need to come in unless you feel it is urgent  Please advise  Thank you

## 2021-05-07 NOTE — TELEPHONE ENCOUNTER
Per chart and PASIIS, the COVID vaccine was given 3/4/21 as per the message and she has not tried Tylenol  Please advise what to set up

## 2021-05-13 ENCOUNTER — TELEPHONE (OUTPATIENT)
Dept: PODIATRY | Facility: CLINIC | Age: 81
End: 2021-05-13

## 2021-05-13 NOTE — TELEPHONE ENCOUNTER
Megan called, she just wanted to make sure she was doing the right thing by stopping the Neosporin  She is still massaging the toe  It is still draining and two days ago, she had an excessive amount of drainage and the toe was throbbing  The throbbing stopped and it is draining normal now  Should she be concerned? Dr Tisha Smith said stop Neosporin and it can drain for 6 weeks  I called Megan back with this information

## 2021-06-28 ENCOUNTER — TELEPHONE (OUTPATIENT)
Dept: INTERNAL MEDICINE CLINIC | Facility: CLINIC | Age: 81
End: 2021-06-28

## 2021-06-28 RX ORDER — BIOTIN 1 MG
1000 TABLET ORAL DAILY
COMMUNITY

## 2021-06-28 RX ORDER — LANOLIN ALCOHOL/MO/W.PET/CERES
1 CREAM (GRAM) TOPICAL 3 TIMES DAILY
COMMUNITY
End: 2022-05-20 | Stop reason: SDDI

## 2021-06-28 NOTE — TELEPHONE ENCOUNTER
The patient had a flight scheduled and canceled it because of her knee injury from 40 years ago  The flight had several layovers  Because of her knee injury she cannot do the layovers they will cause her to much pain; unloading her luggage and walking from gate to get is difficult for her  The airline is not returning the cost of the flight even though she bought insurance for the flight  The airline told her there are restrictions on the insurance and they will not reimburse her  The patient has a form for you to fill out regarding her medical condition and because of that she cannot do the layovers  She will be bringing in the form today

## 2021-06-29 ENCOUNTER — OFFICE VISIT (OUTPATIENT)
Dept: INTERNAL MEDICINE CLINIC | Facility: CLINIC | Age: 81
End: 2021-06-29
Payer: COMMERCIAL

## 2021-06-29 VITALS — HEART RATE: 70 BPM | DIASTOLIC BLOOD PRESSURE: 84 MMHG | OXYGEN SATURATION: 97 % | SYSTOLIC BLOOD PRESSURE: 136 MMHG

## 2021-06-29 DIAGNOSIS — G89.29 CHRONIC PAIN OF RIGHT KNEE: Primary | ICD-10-CM

## 2021-06-29 DIAGNOSIS — M25.561 CHRONIC PAIN OF RIGHT KNEE: Primary | ICD-10-CM

## 2021-06-29 PROCEDURE — 1160F RVW MEDS BY RX/DR IN RCRD: CPT | Performed by: NURSE PRACTITIONER

## 2021-06-29 PROCEDURE — 99213 OFFICE O/P EST LOW 20 MIN: CPT | Performed by: NURSE PRACTITIONER

## 2021-07-02 PROBLEM — G89.29 CHRONIC PAIN OF RIGHT KNEE: Status: ACTIVE | Noted: 2021-07-02

## 2021-07-02 PROBLEM — M25.561 CHRONIC PAIN OF RIGHT KNEE: Status: ACTIVE | Noted: 2021-07-02

## 2021-07-02 NOTE — PROGRESS NOTES
Assessment/Plan:    Chronic pain of right knee  Form filled out       Diagnoses and all orders for this visit:    Chronic pain of right knee    Other orders  -     Biotin 1000 MCG tablet; Take 1,000 mcg by mouth Three times a day  -     glucosamine-chondroitin 500-400 MG tablet; Take 1 tablet by mouth Three times a day          Subjective:      Patient ID: Bianca Saeed is a 78 y o  female  Patient is here for chronic knee pain  Suffered a knee injury while playing tennis in the 70's   Sees an orthopedic dr at [facility]  Diagnosed with OA of right knee  Declined steroid injection    She has chronic pain and cannot walk long distances  She needs a form fill out stating so so they can refund her flight  Her flight had a layover and she could not do that because of all the walking      The following portions of the patient's history were reviewed and updated as appropriate: allergies, current medications, past family history, past medical history, past social history, past surgical history and problem list     Review of Systems   Constitutional: Negative  HENT: Negative  Eyes: Negative  Respiratory: Negative  Cardiovascular: Negative  Gastrointestinal: Negative  Musculoskeletal: Positive for arthralgias  Neurological: Negative  Objective:      /84   Pulse 70   SpO2 97%          Physical Exam  Vitals and nursing note reviewed  Constitutional:       Appearance: She is well-developed  HENT:      Head: Normocephalic and atraumatic  Right Ear: External ear normal       Left Ear: External ear normal       Nose: Nose normal    Eyes:      Conjunctiva/sclera: Conjunctivae normal       Pupils: Pupils are equal, round, and reactive to light  Cardiovascular:      Rate and Rhythm: Normal rate and regular rhythm  Musculoskeletal:      Cervical back: Normal range of motion and neck supple  Skin:     General: Skin is warm and dry     Neurological:      Mental Status: She is alert and oriented to person, place, and time

## 2021-10-20 ENCOUNTER — HOSPITAL ENCOUNTER (OUTPATIENT)
Dept: RADIOLOGY | Age: 81
Discharge: HOME | End: 2021-10-20
Payer: COMMERCIAL

## 2021-10-20 VITALS — BODY MASS INDEX: 24.94 KG/M2 | WEIGHT: 127 LBS | HEIGHT: 60 IN

## 2021-10-20 DIAGNOSIS — Z12.31 BREAST CANCER SCREENING BY MAMMOGRAM: ICD-10-CM

## 2021-10-20 PROCEDURE — 77067 SCR MAMMO BI INCL CAD: CPT

## 2021-10-20 PROCEDURE — 77063 BREAST TOMOSYNTHESIS BI: CPT

## 2021-11-01 ENCOUNTER — APPOINTMENT (OUTPATIENT)
Dept: LAB | Facility: CLINIC | Age: 81
End: 2021-11-01
Payer: COMMERCIAL

## 2021-11-01 DIAGNOSIS — R73.03 PREDIABETES: ICD-10-CM

## 2021-11-01 DIAGNOSIS — Z13.6 SCREENING FOR CARDIOVASCULAR CONDITION: ICD-10-CM

## 2021-11-01 LAB
ALBUMIN SERPL BCP-MCNC: 3.6 G/DL (ref 3.5–5)
ALP SERPL-CCNC: 82 U/L (ref 46–116)
ALT SERPL W P-5'-P-CCNC: 34 U/L (ref 12–78)
ANION GAP SERPL CALCULATED.3IONS-SCNC: 10 MMOL/L (ref 4–13)
AST SERPL W P-5'-P-CCNC: 25 U/L (ref 5–45)
BILIRUB SERPL-MCNC: 0.46 MG/DL (ref 0.2–1)
BUN SERPL-MCNC: 12 MG/DL (ref 5–25)
CALCIUM SERPL-MCNC: 8.5 MG/DL (ref 8.3–10.1)
CHLORIDE SERPL-SCNC: 103 MMOL/L (ref 100–108)
CHOLEST SERPL-MCNC: 192 MG/DL (ref 50–200)
CO2 SERPL-SCNC: 29 MMOL/L (ref 21–32)
CREAT SERPL-MCNC: 0.7 MG/DL (ref 0.6–1.3)
EST. AVERAGE GLUCOSE BLD GHB EST-MCNC: 111 MG/DL
GFR SERPL CREATININE-BSD FRML MDRD: 80 ML/MIN/1.73SQ M
GLUCOSE P FAST SERPL-MCNC: 86 MG/DL (ref 65–99)
HBA1C MFR BLD: 5.5 %
HDLC SERPL-MCNC: 92 MG/DL
LDLC SERPL CALC-MCNC: 86 MG/DL (ref 0–100)
POTASSIUM SERPL-SCNC: 3.4 MMOL/L (ref 3.5–5.3)
PROT SERPL-MCNC: 7.3 G/DL (ref 6.4–8.2)
SODIUM SERPL-SCNC: 142 MMOL/L (ref 136–145)
TRIGL SERPL-MCNC: 69 MG/DL

## 2021-11-01 PROCEDURE — 36415 COLL VENOUS BLD VENIPUNCTURE: CPT

## 2021-11-01 PROCEDURE — 80053 COMPREHEN METABOLIC PANEL: CPT

## 2021-11-01 PROCEDURE — 83036 HEMOGLOBIN GLYCOSYLATED A1C: CPT

## 2021-11-01 PROCEDURE — 80061 LIPID PANEL: CPT

## 2021-11-03 ENCOUNTER — TELEPHONE (OUTPATIENT)
Dept: INTERNAL MEDICINE CLINIC | Facility: CLINIC | Age: 81
End: 2021-11-03

## 2021-11-03 ENCOUNTER — OFFICE VISIT (OUTPATIENT)
Dept: INTERNAL MEDICINE CLINIC | Facility: CLINIC | Age: 81
End: 2021-11-03
Payer: COMMERCIAL

## 2021-11-03 VITALS
SYSTOLIC BLOOD PRESSURE: 138 MMHG | OXYGEN SATURATION: 98 % | DIASTOLIC BLOOD PRESSURE: 80 MMHG | HEIGHT: 60 IN | RESPIRATION RATE: 16 BRPM | HEART RATE: 65 BPM | BODY MASS INDEX: 24.35 KG/M2 | WEIGHT: 124 LBS

## 2021-11-03 DIAGNOSIS — R73.03 PREDIABETES: ICD-10-CM

## 2021-11-03 DIAGNOSIS — E87.6 LOW BLOOD POTASSIUM: ICD-10-CM

## 2021-11-03 DIAGNOSIS — G57.01 PIRIFORMIS SYNDROME OF RIGHT SIDE: ICD-10-CM

## 2021-11-03 DIAGNOSIS — Z00.00 MEDICARE ANNUAL WELLNESS VISIT, SUBSEQUENT: Primary | ICD-10-CM

## 2021-11-03 DIAGNOSIS — M79.604 LOWER LIMB PAIN, POSTERIOR, RIGHT: ICD-10-CM

## 2021-11-03 DIAGNOSIS — M54.31 SCIATICA OF RIGHT SIDE: ICD-10-CM

## 2021-11-03 PROCEDURE — 1170F FXNL STATUS ASSESSED: CPT | Performed by: INTERNAL MEDICINE

## 2021-11-03 PROCEDURE — G0439 PPPS, SUBSEQ VISIT: HCPCS | Performed by: INTERNAL MEDICINE

## 2021-11-03 PROCEDURE — 1125F AMNT PAIN NOTED PAIN PRSNT: CPT | Performed by: INTERNAL MEDICINE

## 2021-11-03 PROCEDURE — 99214 OFFICE O/P EST MOD 30 MIN: CPT | Performed by: INTERNAL MEDICINE

## 2021-11-03 PROCEDURE — 1160F RVW MEDS BY RX/DR IN RCRD: CPT | Performed by: INTERNAL MEDICINE

## 2021-11-03 PROCEDURE — 1036F TOBACCO NON-USER: CPT | Performed by: INTERNAL MEDICINE

## 2021-11-17 ENCOUNTER — EVALUATION (OUTPATIENT)
Dept: PHYSICAL THERAPY | Facility: CLINIC | Age: 81
End: 2021-11-17
Payer: COMMERCIAL

## 2021-11-17 DIAGNOSIS — G57.01 PIRIFORMIS SYNDROME OF RIGHT SIDE: ICD-10-CM

## 2021-11-17 PROCEDURE — 97162 PT EVAL MOD COMPLEX 30 MIN: CPT | Performed by: PHYSICAL THERAPIST

## 2021-11-18 ENCOUNTER — OFFICE VISIT (OUTPATIENT)
Dept: PHYSICAL THERAPY | Facility: CLINIC | Age: 81
End: 2021-11-18
Payer: COMMERCIAL

## 2021-11-18 DIAGNOSIS — G57.01 PIRIFORMIS SYNDROME OF RIGHT SIDE: Primary | ICD-10-CM

## 2021-11-18 PROCEDURE — 97112 NEUROMUSCULAR REEDUCATION: CPT | Performed by: PHYSICAL THERAPIST

## 2021-11-18 PROCEDURE — 97140 MANUAL THERAPY 1/> REGIONS: CPT | Performed by: PHYSICAL THERAPIST

## 2021-11-22 ENCOUNTER — OFFICE VISIT (OUTPATIENT)
Dept: PHYSICAL THERAPY | Facility: CLINIC | Age: 81
End: 2021-11-22
Payer: COMMERCIAL

## 2021-11-22 DIAGNOSIS — G57.01 PIRIFORMIS SYNDROME OF RIGHT SIDE: Primary | ICD-10-CM

## 2021-11-22 PROCEDURE — 97140 MANUAL THERAPY 1/> REGIONS: CPT | Performed by: PHYSICAL THERAPIST

## 2021-11-22 PROCEDURE — 97110 THERAPEUTIC EXERCISES: CPT | Performed by: PHYSICAL THERAPIST

## 2021-11-22 PROCEDURE — 97112 NEUROMUSCULAR REEDUCATION: CPT | Performed by: PHYSICAL THERAPIST

## 2021-11-23 ENCOUNTER — NURSE TRIAGE (OUTPATIENT)
Dept: OTHER | Facility: OTHER | Age: 81
End: 2021-11-23

## 2021-11-29 ENCOUNTER — APPOINTMENT (OUTPATIENT)
Dept: PHYSICAL THERAPY | Facility: CLINIC | Age: 81
End: 2021-11-29
Payer: COMMERCIAL

## 2021-12-02 ENCOUNTER — APPOINTMENT (OUTPATIENT)
Dept: LAB | Facility: CLINIC | Age: 81
End: 2021-12-02
Payer: COMMERCIAL

## 2021-12-02 DIAGNOSIS — E87.6 LOW BLOOD POTASSIUM: ICD-10-CM

## 2021-12-02 LAB
ANION GAP SERPL CALCULATED.3IONS-SCNC: 10 MMOL/L (ref 4–13)
BUN SERPL-MCNC: 16 MG/DL (ref 5–25)
CALCIUM SERPL-MCNC: 9 MG/DL (ref 8.3–10.1)
CHLORIDE SERPL-SCNC: 104 MMOL/L (ref 100–108)
CO2 SERPL-SCNC: 29 MMOL/L (ref 21–32)
CREAT SERPL-MCNC: 0.75 MG/DL (ref 0.6–1.3)
GFR SERPL CREATININE-BSD FRML MDRD: 74 ML/MIN/1.73SQ M
GLUCOSE SERPL-MCNC: 82 MG/DL (ref 65–140)
POTASSIUM SERPL-SCNC: 3.6 MMOL/L (ref 3.5–5.3)
SODIUM SERPL-SCNC: 143 MMOL/L (ref 136–145)

## 2021-12-02 PROCEDURE — 36415 COLL VENOUS BLD VENIPUNCTURE: CPT

## 2021-12-02 PROCEDURE — 80048 BASIC METABOLIC PNL TOTAL CA: CPT

## 2022-01-04 ENCOUNTER — IMMUNIZATIONS (OUTPATIENT)
Dept: FAMILY MEDICINE CLINIC | Facility: HOSPITAL | Age: 82
End: 2022-01-04

## 2022-01-04 DIAGNOSIS — Z23 ENCOUNTER FOR IMMUNIZATION: Primary | ICD-10-CM

## 2022-01-04 PROCEDURE — 91306 COVID-19 MODERNA VACC 0.25 ML BOOSTER: CPT

## 2022-01-04 PROCEDURE — 0064A COVID-19 MODERNA VACC 0.25 ML BOOSTER: CPT

## 2022-02-04 ENCOUNTER — EVALUATION (OUTPATIENT)
Dept: PHYSICAL THERAPY | Facility: CLINIC | Age: 82
End: 2022-02-04
Payer: COMMERCIAL

## 2022-02-04 ENCOUNTER — TELEPHONE (OUTPATIENT)
Dept: INTERNAL MEDICINE CLINIC | Facility: CLINIC | Age: 82
End: 2022-02-04

## 2022-02-04 DIAGNOSIS — M54.31 SCIATIC NERVE PAIN, RIGHT: ICD-10-CM

## 2022-02-04 DIAGNOSIS — G57.01 PIRIFORMIS SYNDROME OF RIGHT SIDE: Primary | ICD-10-CM

## 2022-02-04 PROCEDURE — 97110 THERAPEUTIC EXERCISES: CPT | Performed by: PHYSICAL THERAPIST

## 2022-02-04 PROCEDURE — 97162 PT EVAL MOD COMPLEX 30 MIN: CPT | Performed by: PHYSICAL THERAPIST

## 2022-02-04 NOTE — PROGRESS NOTES
PT Evaluation     Today's date: 2022  Patient name: Neo Buchanan  : 1940  MRN: [id number]  Referring provider: Jaquelin Lemos DO  Dx:   Encounter Diagnosis     ICD-10-CM    1  Piriformis syndrome of right side  G57 01 Ambulatory referral to Physical Therapy   2  Sciatic nerve pain, right  M54 31        Start Time: 1430  Stop Time: 1515  Total time in clinic (min): 45 minutes    Assessment  Assessment details: Neo Buchanan is a pleasant 78 y o  female who presents with R sided radicular symptoms from her buttocks to calf  The primary movement problem is R gluteal and  Piriformis hypomobility and resulting in radicular sciatic pain down her R LE into her calf with significant neural tension and limiting her ability to drive, get out of a chair and sit without limitation  Symptoms are reproduced with nerve tensioning, indicating nerve irritation  Unable to reproduce symptoms through lumbar movements  No further referral appears necessary at this time based upon examination results  I expect she will be able to meet her long term goals at time of discharge pending compliance with PT plan of care  The patient's greatest concerns are the pain she is experiencing, worry over not knowing what's wrong, concern at no signs of improvement, fear of not being able to keep active and future ill health (and wanting to prevent it)  Problem List:  1) piriformis and gluteal hypomobility causing referred sciatic pain  2) hip hypomobility    Etiologic factors include repetitive poor body mechanics, poor hip mobility and hypomobility of her R knee joint due to chronic injury    Impairments: abnormal muscle firing, abnormal muscle tone, abnormal or restricted ROM, abnormal movement, lacks appropriate home exercise program, pain with function, poor posture  and poor body mechanics  Understanding of Dx/Px/POC: good   Prognosis: good  Prognosis details: Positive prognostic indicators include positive attitude toward recovery and successful relief of symptoms with physical therapy in the past  Negative prognostic indicators include high symptom irritability, multiple concurrent orthopedic problems and poor compliance with physical therapy in the past       Goals  Patient will be independent with home exercise program    Patient will be able to manage symptoms independently      (STG) Impairment Goals 4-6 weeks  - Decrease pain to <1/10  - Improve lumbar AROM to 100% throughout  - Increase hip strength to 4+/5 throughout     (LTG) Functional Goals 6-8 weeks  - Return to Prior Level of Function  - Increase Functional Status Measure (FOTO) to: predicted outcome  - Patient will be independent with HEP  - Patient will be able to sit without increased pain/compensation/difficulty  - Patient will be able to perform sit to stand without increased pain/compensation/difficulty   - Patient will be able to bend forward without increased pain/compensation/difficulty down her R leg         Plan  Patient would benefit from: skilled physical therapy  Referral necessary: No  Planned modality interventions: cryotherapy and thermotherapy: hydrocollator packs  Planned therapy interventions: abdominal trunk stabilization, activity modification, behavior modification, flexibility, functional ROM exercises, home exercise program, therapeutic exercise, therapeutic activities, stretching, strengthening, postural training, patient education, neuromuscular re-education, massage and manual therapy  Frequency: 1-2x/week  Duration in weeks: 4  Treatment plan discussed with: patient        Subjective Evaluation    History of Present Illness  Mechanism of injury: WORK/SCHOOL: retired  HOME LIFE: lives by herself but sometimes stays with her daughter  PLOF: Has been seen in PT in the past for the same diagnosis  Attended 3 sessions and then self-discharged  HISTORY OF CURRENT INJURY: Pain was onset a few months ago following a long plane ride   Says that since she was here last, she has been keeping up with her HEP and that it has been helping but more recently her pain is back to square one  She says she has been having some pain into her R calf  She is also having difficulty with driving  She denies any n/t, and describes it as a dull ache in her calf  PAIN LOCATION/DESCRIPTORS: pain radiating from her R buttocks down into her calf, describes it as a dull ache  AGGRAVATING FACTORS: prolonged driving  EASES: soft tissue work to her posterior thigh region   SPECIAL QUESTIONS:    Tereza Logan denies a new onset of Bladder incontinence, Bowel dysfunction, Recent unexplained weight loss, Clumsy or unsteadiness and Constant night pain    PATIENT GOALS: Be able to return to her PLOF and driving without pain    Pain  Current pain ratin  At best pain ratin  At worst pain rating: 10  Quality: dull ache  Relieving factors: change in position  Aggravating factors: sitting  Progression: worsening    Treatments  Previous treatment: physical therapy  Patient Goals  Patient goals for therapy: decreased pain  Patient goal: be able to drive and sit without symptoms        Objective     Palpation     Additional Palpation Details  (+) R piriformis    Neurological Testing     Reflexes   Left   Patellar (L4): brisk (3+)  Achilles (S1): normal (2+)    Right   Patellar (L4): brisk (3+)  Achilles (S1): normal (2+)    Active Range of Motion     Additional Active Range of Motion Details  LS AROM:   Flexion: 25% (pain and pulling down R leg)  Extension: 50%  SideBending right: 50%  SideBending left: 50%  Rotation right: 75%  Rotation left: 75%    Mechanical Assessment    Cervical      Thoracic    Lying flexion: sustained positions  Pain location: no change  Lying extension: sustained positions  Pain location: no change    Lumbar    Standing flexion:   Pain level: increased  Lying flexion: sustained positions  Pain location: no change    Strength/Myotome Testing     Left Knee Flexion: 5  Extension: 5    Right Knee   Flexion: 5  Extension: 5    Tests     Lumbar     Right   Positive slump test      General Comments:      Lumbar Comments  Patient reports improvement in symptoms following sciatic nerve gliding and stretching/mobility exercises      Flowsheet Rows      Most Recent Value   PT/OT G-Codes    Current Score 62   Projected Score 69             Diagnosis: R sided piriformis syndrome   Precautions: hx of CA   Primary Goals: sciatic nerve mobility, hip mobility, decrease radicular symptoms with sitting   *asterisks by exercise = given for HEP   Manuals        Piriformis stretch        Piriformis release                                There Ex        bike        Seated hamstring stretch*        Piriformis stretch (seated/sitting)*        Calf stretch                                                Neuro Re-Ed        Sciatic nerve glide*                                                                                                 Re-evaluation              Ther Act                                         Modalities

## 2022-02-08 ENCOUNTER — OFFICE VISIT (OUTPATIENT)
Dept: PHYSICAL THERAPY | Facility: CLINIC | Age: 82
End: 2022-02-08
Payer: COMMERCIAL

## 2022-02-08 DIAGNOSIS — M54.31 SCIATIC NERVE PAIN, RIGHT: ICD-10-CM

## 2022-02-08 DIAGNOSIS — G57.01 PIRIFORMIS SYNDROME OF RIGHT SIDE: Primary | ICD-10-CM

## 2022-02-08 PROCEDURE — 97112 NEUROMUSCULAR REEDUCATION: CPT | Performed by: PHYSICAL THERAPIST

## 2022-02-08 PROCEDURE — 97110 THERAPEUTIC EXERCISES: CPT | Performed by: PHYSICAL THERAPIST

## 2022-02-08 PROCEDURE — 97140 MANUAL THERAPY 1/> REGIONS: CPT | Performed by: PHYSICAL THERAPIST

## 2022-02-08 NOTE — PROGRESS NOTES
Daily Note     Today's date: 2022  Patient name: Tatiana Armenta  : 1940  MRN: [id number]  Referring provider: Champ Adams DO  Dx:   Encounter Diagnosis     ICD-10-CM    1  Piriformis syndrome of right side  G57 01    2  Sciatic nerve pain, right  M54 31        Start Time: 1130  Stop Time: 1215  Total time in clinic (min): 45 minutes    Subjective: Patient reports that she always feels better following her exercises  She did try them before driving but she still had some pain  Says it is worse with prolonged sitting  She feels good with walking  Objective: See treatment diary below      Assessment: Tolerated treatment well  Patient would benefit from continued PT  Session focused on STM and release of R piriformis, as well as nerve gliding and mobility exercises  Session also incorporated a lot of patient education and teaching, as patient quickly forgets her HEP and what she is supposed to do and how often  She expresses only being interested in attending many more sessions due to her co-pay  Will progress as able  Plan: Continue per plan of care  Progress treatment as tolerated         Diagnosis: R sided piriformis syndrome   Precautions: hx of CA   Primary Goals: sciatic nerve mobility, hip mobility, decrease radicular symptoms with sitting   *asterisks by exercise = given for HEP   Manuals        Piriformis stretch        Piriformis release LCB       STM posterior R leg LCB                       There Ex        bike Recumbent 5 mins       Seated hamstring stretch* 20" 4x        Piriformis stretch (seated/sitting)* Supine 20" 4x        Calf stretch        Piriformis release with lacrosse ball 5 mins                                       Neuro Re-Ed        Sciatic nerve glide* Supine 3" 10x                                                                                        Education HEP        Re-evaluation              Ther Act Modalities

## 2022-02-14 ENCOUNTER — TELEPHONE (OUTPATIENT)
Dept: INTERNAL MEDICINE CLINIC | Facility: CLINIC | Age: 82
End: 2022-02-14

## 2022-02-15 ENCOUNTER — OFFICE VISIT (OUTPATIENT)
Dept: PHYSICAL THERAPY | Facility: CLINIC | Age: 82
End: 2022-02-15
Payer: COMMERCIAL

## 2022-02-15 DIAGNOSIS — G57.01 PIRIFORMIS SYNDROME OF RIGHT SIDE: Primary | ICD-10-CM

## 2022-02-15 DIAGNOSIS — M54.31 SCIATIC NERVE PAIN, RIGHT: ICD-10-CM

## 2022-02-15 PROCEDURE — 97110 THERAPEUTIC EXERCISES: CPT | Performed by: PHYSICAL THERAPIST

## 2022-02-15 PROCEDURE — 97112 NEUROMUSCULAR REEDUCATION: CPT | Performed by: PHYSICAL THERAPIST

## 2022-02-15 NOTE — PROGRESS NOTES
Daily Note     Today's date: 2/15/2022  Patient name: Juan Saini  : 1940  MRN: [id number]  Referring provider: Glynn Phillips DO  Dx:   Encounter Diagnosis     ICD-10-CM    1  Piriformis syndrome of right side  G57 01    2  Sciatic nerve pain, right  M54 31        Start Time: 1130  Stop Time: 1215  Total time in clinic (min): 45 minutes    Subjective: Patient reports that she is only doing her HEP 1-2x/day  Says that she has not been able to increase the frequency because she is so busy  She is still having symptoms daily, mostly when she sits  Objective: See treatment diary below      Assessment: Tolerated treatment well  Patient would benefit from continued PT  Patient continues to exhibit very poor carry-over of exercises from session to session with continual need for re-education and cues for form  She reports poor compliance with HEP, saying that she is too busy  I continue to educate her on the importance of compliance with HEP if she hopes for symptom improvement  I added in some strengthening exercises for core and hips today which were tolerated well  She did not have symptom onset during or post session  Will continue to progress as able  Plan: Continue per plan of care  Progress treatment as tolerated         Diagnosis: R sided piriformis syndrome   Precautions: hx of CA   Primary Goals: sciatic nerve mobility, hip mobility, decrease radicular symptoms with sitting   *asterisks by exercise = given for HEP   Manuals 2/8 2/15      Piriformis stretch        Piriformis release LCB       STM posterior R leg LCB                       There Ex        bike Recumbent 5 mins Recumbent 5 mins      Seated hamstring stretch* 20" 4x        Piriformis stretch (seated/sitting)* Supine 20" 4x  Seated 10" 10  Supine 20" 4x      Calf stretch        Piriformis release with lacrosse ball 5 mins       LTR  3" 10x      DKTC  10" 10x      bridging  RTB 3" 2x10      clamshells  RTB 3" 2x10 ea Neuro Re-Ed        Sciatic nerve glide* Supine 3" 10x Supine 3" 10x                                                                                       Education HEP HEP       Re-evaluation              Ther Act                                         Modalities

## 2022-02-21 ENCOUNTER — OFFICE VISIT (OUTPATIENT)
Dept: PHYSICAL THERAPY | Facility: CLINIC | Age: 82
End: 2022-02-21
Payer: COMMERCIAL

## 2022-02-21 DIAGNOSIS — M54.31 SCIATIC NERVE PAIN, RIGHT: Primary | ICD-10-CM

## 2022-02-21 DIAGNOSIS — G57.01 PIRIFORMIS SYNDROME OF RIGHT SIDE: ICD-10-CM

## 2022-02-21 PROCEDURE — 97112 NEUROMUSCULAR REEDUCATION: CPT | Performed by: PHYSICAL THERAPIST

## 2022-02-21 PROCEDURE — 97110 THERAPEUTIC EXERCISES: CPT | Performed by: PHYSICAL THERAPIST

## 2022-02-21 NOTE — PROGRESS NOTES
Daily Note     Today's date: 2022  Patient name: Elenita Rice  : 1940  MRN: [id number]  Referring provider: Miguel Cornejo DO  Dx:   Encounter Diagnosis     ICD-10-CM    1  Sciatic nerve pain, right  M54 31    2  Piriformis syndrome of right side  G57 01        Start Time: 1130  Stop Time: 1215  Total time in clinic (min): 45 minutes    Subjective: Patient reports that the pain is no longer in her calf but that she still has it in the back of her thigh  She has been trying to do more of her HEP  Objective: See treatment diary below      Assessment: Tolerated treatment well  Patient feels that she is comfortable and confident in her HEP at this time so will plan to hold on scheduling anymore PT and I will leave her POC open for 30 days and then discharge at that time if she has not made any more appointments  She will also be beginning women's health PT in the next few weeks  I updated her HEP and provided her with print outs, answering any questions she had  I stressed the importance of keeping up with her HEP so symptoms do not worsen  She is in agreement with plan  Plan: Patient will hold on scheduling any more appointments at this time  She will continue with HEP and make more appointments if needed  She will be discharged in 30 days if no more appointments requested        Diagnosis: R sided piriformis syndrome   Precautions: hx of CA   Primary Goals: sciatic nerve mobility, hip mobility, decrease radicular symptoms with sitting   *asterisks by exercise = given for HEP   Manuals 2/8 2/15 2/21     Piriformis stretch        Piriformis release LCB       STM posterior R leg LCB                       There Ex        bike Recumbent 5 mins Recumbent 5 mins Recumbent 5 mins     Seated hamstring stretch* 20" 4x   20" 4x      Piriformis stretch (seated/sitting)* Supine 20" 4x  Seated 10" 10  Supine 20" 4x Seated 10" 10  Supine 20" 4x     Calf stretch        Piriformis release with lacrosse ball 5 mins       LTR  3" 10x 3" 10x     DKTC  10" 10x 10" 10x     bridging  RTB 3" 2x10      clamshells  RTB 3" 2x10 ea      Neuro Re-Ed        Sciatic nerve glide* Supine 3" 10x Supine 3" 10x Supine 3" 10x                                                                                      Education HEP HEP HEP      Re-evaluation              Ther Act                                         Modalities

## 2022-03-01 ENCOUNTER — EVALUATION (OUTPATIENT)
Dept: PHYSICAL THERAPY | Facility: REHABILITATION | Age: 82
End: 2022-03-01
Payer: COMMERCIAL

## 2022-03-01 DIAGNOSIS — R35.0 URINARY FREQUENCY: ICD-10-CM

## 2022-03-01 DIAGNOSIS — R39.15 URINARY URGENCY: Primary | ICD-10-CM

## 2022-03-01 PROCEDURE — 97162 PT EVAL MOD COMPLEX 30 MIN: CPT | Performed by: PHYSICAL THERAPIST

## 2022-03-01 NOTE — PROGRESS NOTES
PT Evaluation     Today's date: 3/1/2022  Patient name: Spencer Salter  : 1940  MRN: [id number]  Referring provider: Osbaldo Shah DO  Dx:   Encounter Diagnosis     ICD-10-CM    1  Urinary urgency  R39 15    2  Urinary frequency  R35 0        Start Time: 1420  Stop Time: 1500  Total time in clinic (min): 40 minutes    Assessment  Assessment details: The patient is a 78 y o  female with complaints of urinary urgency and frequency which has gotten worse recently  She does have a history of colon cancer with resection of her colon  She also had a hysterectomy prior to this  Education provided today  Pelvic floor muscle examination was not performed today due to time constraints  Will perform next visit  She was given bladder logs to fill out and bring in to her next visit for further assessment as well  She would benefit from pelvic floor physical therapy to help reduce/manage pain and symptoms, address impairments and maximize function and quality of life upon discharge  fShe will be given updated HEP throughout episode of care  Thank you for the referral     Therapeutic activities performed upon examination included education regarding pelvic floor anatomy, explanation of exam technique, explanation of exam findings and discussion of treatment plan as well as expectations of the patient to emphasize the importance of compliance and adherence to physical therapy visits  Impairments: abnormal muscle tone, impaired physical strength and lacks appropriate home exercise program  Understanding of Dx/Px/POC: good   Prognosis: good    Goals  ST  The patient will improve pelvic floor muscle strength 1 to 2 grade in 8 to 12 weeks  2  The patient will achieve pelvic floor muscle endurance to 2 to 3 seconds in 8 to 12 weeks  3  The patient will improve water intake and reduce/eliminate bladder irritatants from her diet in 8 to 12 weeks  LT  The patient will be independent with HEP upon discharge  2  The patient will control urinary urgency and minimize urinary frequency upon discharge  3  The patient will Improve urinary frequency to a minimum of once every 2 hours upon discharge            Plan  Patient would benefit from: skilled PT  Planned modality interventions: biofeedback and ultrasound (Real Time Ultrasound)  Planned therapy interventions: manual therapy, neuromuscular re-education, patient education, strengthening, therapeutic exercise, therapeutic training, home exercise program, abdominal trunk stabilization, self care, postural training, therapeutic activities and stretching  Frequency: 1x week  Duration in visits: 8  Duration in weeks: 8  Plan of Care beginning date: 3/1/2022  Plan of Care expiration date: 4/26/2022  Treatment plan discussed with: patient        PT Pelvic Floor Subjective:   History of Present Illness: The patient notes that she has been experiencing urinary incontinence, which has been a more recent problem over the past month and a half  She saw Dr Kenyetta Ponce in regards to her symptoms and had an examination  She notes that she had no evidence of prolapse upon the examination  They did discuss cutting back on bladder irritants  She does have a history of colon cancer (about 30 years ago)  She had resection of her colon at the time  She is seeing a colorectal specialist this week in regards to some of her symptoms and she also is having problems with hemorrhoids  She has been experiencing frequency and urgency of her bladder  She would like to try to learn how to control her bladder  She did have an episode of fecal incontinence last week which has never happened since her colon cancer surgery     Diet and Exercise:      Was going to the gym prior to COVID-19, not currently formally exercising  Co-morbidities:    -R knee injury years ago- will need a TKR in near future  -R sciatica  OB/ gyn History    Gestational History:     Number of term pregnancies: 1 Delivery Type:  section      Delivery Complications:  Patient notes that she did have a few miscarriages  Hysterectomy in early 40's due to fibroid  Patient was taking Estrogen supplements for 1-2 years then stopped  No major post menopausal symptoms  Bladder Function:     Voiding Difficulties positive for: urgency, frequent urination and incomplete emptying      Voiding Difficulties negative for: straining      Voiding Difficulties comments:     Urinary frequency: about every 2 years  Urinary leakage: urine leakage    Urinary leakage aggravated by: walking to the bathroom (not frequently)    Urinary leakage not aggravated by: coughing and sneezing    Nocturia (episodes per night): 2 (averages < 8 hours of sleep; falls asleep at 8 pm at night wakes up at 4 am)    Fluid Intake Type: Water, coffee and tea    Intake (ounces): Intake (ounces) comment: Coffee: 2-3 cups  Tea: sporadic; maybe 1 cup; rodolfo tea  Water: 16 ounces of less spread throughout the day  Bowel Function:     Voiding DIfficulties: urgency      Bowel Function comments:  1 episode of fecal incontinence  Occasional urgency of stools  Patient feels movement in rectum  Straining on occasion  Having issues with hemorrhoids - lump at anus, some bleeding, some pain    Bowel frequency: daily  Pain:     No pain reported by patient        Objective           Precautions: hx of hysterectomy; hx of colon cancer and colon resection  [employer] HEP    Manuals 3/1                                                                Neuro Re-Ed             PFMC: Slow Holds             PFMC: Quick Flicks             PFMC + hip abd isometrics             PFMC + hip add isometrics             PFMC + ecc bridge             PFMC in sitting             PFMC seated + tilt             PFMC in standing                          TA ADIM             TA + BKFO             TA + SLR             Ther Ex             PPT             Bridge + SLR             Bridge +  Ther Activity             EDUCATION 10 min            PFMC + reach             PFMC + squat             PFMC + lift                                       Gait Training                                       Modalities

## 2022-03-04 ENCOUNTER — HOSPITAL ENCOUNTER (OUTPATIENT)
Dept: VASCULAR ULTRASOUND | Facility: HOSPITAL | Age: 82
Discharge: HOME | End: 2022-03-04
Payer: COMMERCIAL

## 2022-03-04 ENCOUNTER — HOSPITAL ENCOUNTER (OUTPATIENT)
Dept: RADIOLOGY | Facility: HOSPITAL | Age: 82
Discharge: HOME | End: 2022-03-04
Payer: COMMERCIAL

## 2022-03-04 DIAGNOSIS — M79.604 LOWER LIMB PAIN, POSTERIOR, RIGHT: ICD-10-CM

## 2022-03-04 DIAGNOSIS — G57.01 PIRIFORMIS SYNDROME OF RIGHT SIDE: ICD-10-CM

## 2022-03-04 PROCEDURE — 73502 X-RAY EXAM HIP UNI 2-3 VIEWS: CPT

## 2022-03-04 PROCEDURE — 93971 EXTREMITY STUDY: CPT

## 2022-03-04 PROCEDURE — 93971 EXTREMITY STUDY: CPT | Performed by: SURGERY

## 2022-03-08 ENCOUNTER — OFFICE VISIT (OUTPATIENT)
Dept: PHYSICAL THERAPY | Facility: REHABILITATION | Age: 82
End: 2022-03-08
Payer: COMMERCIAL

## 2022-03-08 DIAGNOSIS — R35.0 URINARY FREQUENCY: ICD-10-CM

## 2022-03-08 DIAGNOSIS — R39.15 URINARY URGENCY: Primary | ICD-10-CM

## 2022-03-08 PROCEDURE — 97112 NEUROMUSCULAR REEDUCATION: CPT | Performed by: PHYSICAL THERAPIST

## 2022-03-08 PROCEDURE — 97530 THERAPEUTIC ACTIVITIES: CPT | Performed by: PHYSICAL THERAPIST

## 2022-03-08 NOTE — PROGRESS NOTES
Daily Note     Today's date: 3/8/2022  Patient name: Susanne Esquivel  : 1940  MRN: [id number]  Referring provider: Cheryl Duffy DO  Dx:   Encounter Diagnosis     ICD-10-CM    1  Urinary urgency  R39 15    2  Urinary frequency  R35 0        Start Time: 1400  Stop Time: 1500  Total time in clinic (min): 60 minutes    Subjective: The patient reports that does get up at least once a night to empty her bladder  She does have episodes of urgency during the day when she is at home  She is going to the Chiropractor on 3/23 for her lower back/sciatica issues  She forgot her bladder logs at home today  Objective: See treatment diary below  Objective   Pelvic Floor Exam   Abdominal assessment: Soft and non tender    3 scars - ; vertical midline and left suprapubic region; all well healed; no hypersensitivity or pain/tenderness; mild restriction in mobility of all 3    General Perineum Exam:   perineum intact (vaginal atrophy and dryness noted)     Negative for swelling, descent, lesion, gaping introitus, no pelvic organ prolapse at rest and perianal erythema    General perineum exam comments: Pelvic floor verbal consent and written consent signed and in chart       Education provided today:   Time Spent on Patient Education: 15 min  PT exam and course of treatment  Bladder and Bowel diary partial review  HEP    Visual Inspection of Perineum:   Excursion of perineal body in cephalad direction with contraction of pelvic floor muscles (PFM): weak  Cotton swab test: non-tender  Sensation: intact    Pelvic Organ Prolapse   no pelvic organ prolapseno pelvic organ prolapse at rest    Pelvic Floor Muscle Exam     Palpation   No increased muscle tension in the pubococcygeus, iIliococcygeus, obturator internus and periurethral  Minimal increased muscle tension in the bulbospongiosus, ischiocavernosus, super transverse perineal and puborectalis  No tenderness on right in the bulbospongiosus, ischiocavernosus, super transverse perineal, puborectalis, pubococcygeus, iliococcygeus, obturator internus and periurethral  No tenderness on left in the bulbospongiosus, ischiocavernosus, super transverse perineal, puborectalis, pubococcygeus, iliococcygeus, obturator internus and periurethral    Muscle Contraction: well isolated  Breathing pattern with contraction: within normal limits  Pelvic floor muscle relaxation is delayed and complete  PERFECT Score   Power right: 2/5  Power left: 2/5  Endurance (seconds to max): 3  Repetitions (before fatigue): 5        Assessment: Tolerated treatment well  Patient does demonstrate some pelvic floor muscle weakness upon examination today  With cueing from PT her coordination improved with exercises  She did have good endurance overall, some fatigue noted across multiple contractions  PF muscle exam consent form signed and in chart  She was given HEP for home today  She will bring bladder logs in with her to next visit for further review  Will perform biofeedback NV as well  Plan: Continue per plan of care        Precautions: hx of hysterectomy; hx of colon cancer and colon resection  [employer] HEP    Manuals 3/1 3/8                                                               Neuro Re-Ed             PFMC: Slow Holds  10x           PFMC: Quick Flicks  24M           PFMC + hip abd isometrics             PFMC + hip add isometrics             PFMC + ecc bridge             PFMC in sitting             PFMC seated + tilt             PFMC in standing                          TA ADIM             TA + BKFO             TA + SLR             Ther Ex             PPT             Bridge + SLR             Bridge + marching                                                                              Ther Activity             EDUCATION 10 min 15 min           PFMC + reach             PFMC + squat             PFMC + lift                                       Gait Training Modalities

## 2022-03-31 ENCOUNTER — OFFICE VISIT (OUTPATIENT)
Dept: PHYSICAL THERAPY | Facility: REHABILITATION | Age: 82
End: 2022-03-31
Payer: COMMERCIAL

## 2022-03-31 DIAGNOSIS — R35.0 URINARY FREQUENCY: ICD-10-CM

## 2022-03-31 DIAGNOSIS — R39.15 URINARY URGENCY: Primary | ICD-10-CM

## 2022-03-31 PROCEDURE — 97112 NEUROMUSCULAR REEDUCATION: CPT | Performed by: PHYSICAL THERAPIST

## 2022-03-31 PROCEDURE — 97530 THERAPEUTIC ACTIVITIES: CPT | Performed by: PHYSICAL THERAPIST

## 2022-03-31 NOTE — PROGRESS NOTES
Daily Note     Today's date: 3/31/2022  Patient name: Taylor Foley  : 1940  MRN: [id number]  Referring provider: Kristy Sims DO  Dx:   Encounter Diagnosis     ICD-10-CM    1  Urinary urgency  R39 15    2  Urinary frequency  R35 0        Start Time: 1105  Stop Time: 1200  Total time in clinic (min): 55 minutes    Subjective: The patient notes that she continues to experience urinary and fecal urgency  She is getting prepared to travel next week for a trip  Objective: See treatment diary below      Assessment: Tolerated treatment well  Patient brought bladder logs back with her today  She does demonstrate frequency within a normal range, 5-8 times a day  She also has an average of 2-3 hours between voids  Did review bladder habits and also discussed HEP going forward  Practiced urge suppression technique and talked through how she would utilize this to help with suppression an urge and how this can help her to regain control of her bladder  Plan: Continue per plan of care        Precautions: hx of hysterectomy; hx of colon cancer and colon resection  [employer] HEP    Manuals 3/1 3/8 3/31                                                              Neuro Re-Ed             PFMC: Slow Holds  10x           PFMC: Quick Flicks  76N 2x6          PFMC + hip abd isometrics             PFMC + hip add isometrics             PFMC + ecc bridge             PFMC in sitting             PFMC seated + tilt             PFMC in standing                          TA ADIM             TA + BKFO             TA + SLR             Ther Ex             PPT             Bridge + SLR             Bridge + marching                                                                              Ther Activity             EDUCATION 10 min 15 min 40 min          PFMC + reach             PFMC + squat             PFMC + lift                                       Gait Training                                       Modalities

## 2022-04-26 ENCOUNTER — TELEPHONE (OUTPATIENT)
Dept: INTERNAL MEDICINE CLINIC | Facility: CLINIC | Age: 82
End: 2022-04-26

## 2022-04-26 DIAGNOSIS — M85.80 OSTEOPENIA, UNSPECIFIED LOCATION: Primary | ICD-10-CM

## 2022-04-26 NOTE — TELEPHONE ENCOUNTER
Patient is requesting a new referral for a  Dex Bone Scan Spine, Hip, Pelvis (One in patients chart is from 2020)    Her appointment is in July

## 2022-04-27 NOTE — TELEPHONE ENCOUNTER
Called pt and advised that order was already placed in chart and connected to her July appt for DEXA  [facility]

## 2022-05-09 ENCOUNTER — CONSULT (OUTPATIENT)
Dept: INTERNAL MEDICINE CLINIC | Facility: CLINIC | Age: 82
End: 2022-05-09
Payer: COMMERCIAL

## 2022-05-09 VITALS
HEART RATE: 81 BPM | HEIGHT: 60 IN | BODY MASS INDEX: 23.95 KG/M2 | TEMPERATURE: 97.8 F | OXYGEN SATURATION: 97 % | RESPIRATION RATE: 16 BRPM | DIASTOLIC BLOOD PRESSURE: 80 MMHG | WEIGHT: 122 LBS | SYSTOLIC BLOOD PRESSURE: 130 MMHG

## 2022-05-09 DIAGNOSIS — Z01.818 PREOPERATIVE CLEARANCE: Primary | ICD-10-CM

## 2022-05-09 DIAGNOSIS — H25.9 SENILE CATARACT OF LEFT EYE, UNSPECIFIED AGE-RELATED CATARACT TYPE: ICD-10-CM

## 2022-05-09 PROCEDURE — 99213 OFFICE O/P EST LOW 20 MIN: CPT | Performed by: NURSE PRACTITIONER

## 2022-05-09 PROCEDURE — 3725F SCREEN DEPRESSION PERFORMED: CPT | Performed by: NURSE PRACTITIONER

## 2022-05-09 RX ORDER — BROMFENAC SODIUM 0.7 MG/ML
SOLUTION/ DROPS OPHTHALMIC
COMMUNITY
Start: 2022-05-04 | End: 2022-05-20 | Stop reason: ALTCHOICE

## 2022-05-09 NOTE — PROGRESS NOTES
Assessment/Plan:    Age-related cataract of left eye  Cleared for surgery       Diagnoses and all orders for this visit:    Preoperative clearance    Senile cataract of left eye, unspecified age-related cataract type    Other orders  -     Prolensa 0 07 % SOLN;  (Patient not taking: Reported on 5/9/2022 )  -     cyanocobalamin (VITAMIN B-12) 500 MCG tablet; Take 500 mcg by mouth daily          Subjective:      Patient ID: Roxanne Ferrera is a 78 y o  female  Patient is here for preop clearance for left cataract surgery  She complains of mild blurry vision  She has no cardiovascular risk factors and she does not take any prescription medications no blood thinners       The following portions of the patient's history were reviewed and updated as appropriate: allergies, current medications, past family history, past medical history, past social history, past surgical history and problem list     Review of Systems   Constitutional: Negative  HENT: Negative  Eyes: Positive for visual disturbance  Respiratory: Negative  Cardiovascular: Negative  Gastrointestinal: Negative  Musculoskeletal: Negative  Neurological: Negative  Objective:      /80 (BP Location: Left arm)   Pulse 81   Temp 97 8 °F (36 6 °C)   Resp 16   Ht 5' (1 524 m)   Wt 55 3 kg (122 lb) Comment: stated  patient refuse to get on scale  SpO2 97%   BMI 23 83 kg/m²          Physical Exam  Vitals and nursing note reviewed  Constitutional:       Appearance: She is well-developed  HENT:      Head: Normocephalic and atraumatic  Right Ear: External ear normal       Left Ear: External ear normal       Nose: Nose normal    Eyes:      Conjunctiva/sclera: Conjunctivae normal       Pupils: Pupils are equal, round, and reactive to light  Cardiovascular:      Rate and Rhythm: Normal rate and regular rhythm  Pulmonary:      Effort: Pulmonary effort is normal       Breath sounds: Normal breath sounds     Abdominal: General: Bowel sounds are normal       Palpations: Abdomen is soft  Musculoskeletal:         General: Normal range of motion  Cervical back: Normal range of motion and neck supple  Skin:     General: Skin is warm and dry  Neurological:      Mental Status: She is alert and oriented to person, place, and time

## 2022-05-10 ENCOUNTER — OFFICE VISIT (OUTPATIENT)
Dept: PHYSICAL THERAPY | Facility: REHABILITATION | Age: 82
End: 2022-05-10
Payer: COMMERCIAL

## 2022-05-10 DIAGNOSIS — R35.0 URINARY FREQUENCY: Primary | ICD-10-CM

## 2022-05-10 DIAGNOSIS — R39.15 URINARY URGENCY: ICD-10-CM

## 2022-05-10 DIAGNOSIS — R15.2 FECAL URGENCY: ICD-10-CM

## 2022-05-10 PROCEDURE — 97530 THERAPEUTIC ACTIVITIES: CPT | Performed by: PHYSICAL THERAPIST

## 2022-05-10 PROCEDURE — 97112 NEUROMUSCULAR REEDUCATION: CPT | Performed by: PHYSICAL THERAPIST

## 2022-05-10 NOTE — PROGRESS NOTES
PT Re-Evaluation     Today's date: 5/10/2022  Patient name: Melissa Anne  : 1940  MRN: [id number]  Referring provider: Immanuel Henry DO  Dx:   Encounter Diagnosis     ICD-10-CM    1  Urinary frequency  R35 0    2  Urinary urgency  R39 15    3  Fecal urgency  R15 2        Start Time: 1110  Stop Time: 1200  Total time in clinic (min): 50 minutes    Assessment  Assessment details: The patient is a 78 y o  female with complaints of urinary urgency and frequency  She has also been experiencing some bowel urgency and incontinence as well  She has been treated for a total of 4 visits including her IE on 3/1  She was recently away taking care of some family and recently returned  She does have a history of colon cancer with resection of her colon  She also had a hysterectomy prior to this  She does demonstrate good overall awareness of her pelvic floor muscles with some weakness and limited endurance noted  She was given subsequent bladder logs to fill out and bring in to her next visit for further assessment as well  She would benefit from continuing pelvic floor physical therapy to help reduce/manage pain and symptoms, address impairments and maximize function and quality of life upon discharge  She will be given updated HEP throughout episode of care  She is having eye surgery next week and then planning on having a TKR in mid   She will return for next visit in 3 weeks  Impairments: abnormal muscle tone, impaired physical strength and lacks appropriate home exercise program  Understanding of Dx/Px/POC: good   Prognosis: good    Goals  ST  The patient will improve pelvic floor muscle strength 1 to 2 grade in 8 to 12 weeks  - not met   2  The patient will achieve pelvic floor muscle endurance to 3 to 5 seconds in 8 to 12 weeks  - not met  3  The patient will improve water intake and reduce/eliminate bladder irritatants from her diet in 8 to 12 weeks  - not met       LT  The patient will be independent with HEP upon discharge  2  The patient will control urinary urgency and minimize urinary frequency upon discharge  3  The patient will Improve urinary frequency to a minimum of once every 2 hours upon discharge            Plan  Patient would benefit from: skilled PT  Planned modality interventions: biofeedback and ultrasound (Real Time Ultrasound)  Planned therapy interventions: manual therapy, neuromuscular re-education, patient education, strengthening, therapeutic exercise, therapeutic training, home exercise program, abdominal trunk stabilization, self care, postural training, therapeutic activities and stretching  Frequency: 1x week  Duration in visits: 8  Duration in weeks: 8  Plan of Care beginning date: 5/10/2022  Plan of Care expiration date: 7/5/2022  Treatment plan discussed with: patient        PT Pelvic Floor Subjective:   History of Present Illness: The patient notes that she has been experiencing urinary incontinence, which has been a more recent problem over the past month and a half  She saw Dr William Nguyen in regards to her symptoms and had an examination  She notes that she had no evidence of prolapse upon the examination  They did discuss cutting back on bladder irritants  She does have a history of colon cancer (about 30 years ago)  She had resection of her colon at the time  She is seeing a colorectal specialist this week in regards to some of her symptoms and she also is having problems with hemorrhoids  She has been experiencing frequency and urgency of her bladder  She would like to try to learn how to control her bladder  She did have an episode of fecal incontinence last week which has never happened since her colon cancer surgery  The patient is having a colonoscopy performed this coming Friday  She notes that she saw the GI recently due to having some incontinence of stool  She does not yet have a follow up visit scheduled   She notes that she traveled recently for 2 weeks and it did not seem to affect her system  She notes that she continues to experience small, frequent bowel movements  Her stools are not hard  She is faithful with her fiber and eats healthy  She was not able to do any exercise while she was out in [place]  She has not seen any change in her urinary leakage  She was able to drink a lot of water recently at a restaraunt and she used the restroom without urgency  Diet and Exercise:      Was going to the gym prior to COVID-19, not currently formally exercising  Co-morbidities:    -R knee injury years ago- will need a TKR in near future  -R sciatica  OB/ gyn History    Gestational History:     Number of term pregnancies: 1    Delivery Type:  section      Delivery Complications:  Patient notes that she did have a few miscarriages  Hysterectomy in early 40's due to fibroid  Patient was taking Estrogen supplements for 1-2 years then stopped  No major post menopausal symptoms  Bladder Function:     Voiding Difficulties positive for: urgency, frequent urination and incomplete emptying      Voiding Difficulties negative for: straining      Voiding Difficulties comments:     Urinary frequency: about every 2 hours; 5-6 times a week  Urinary leakage: urine leakage    Urinary leakage aggravated by: walking to the bathroom (not frequently)    Urinary leakage not aggravated by: coughing and sneezing    Nocturia (episodes per night): 2 (averages < 8 hours of sleep; falls asleep at 8 pm at night wakes up at 4 am)    Fluid Intake Type: Water, coffee and tea    Intake (ounces):      Intake (ounces) comment: Coffee: 2-3 cups  Tea: sporadic; maybe 1 cup; rodolfo tea  Water: 24 ounces of less spread throughout the day  Bowel Function:     Voiding DIfficulties: urgency      Bowel Function comments:  1 episode of fecal incontinence  Occasional urgency of stools  Patient feels movement in rectum  Straining on occasion  Having issues with hemorrhoids - lump at anus, some bleeding, some pain    Bowel frequency: daily  Pain:     No pain reported by patient  Objective   Pelvic Floor Exam   Abdominal assessment: Soft and non tender    3 scars - ; vertical midline and left suprapubic region; all well healed; no hypersensitivity or pain/tenderness; mild restriction in mobility of all 3    General Perineum Exam:   perineum intact (vaginal atrophy and dryness noted)  Negative for swelling, descent, lesion, gaping introitus, no pelvic organ prolapse at rest and perianal erythema    General perineum exam comments: Pelvic floor verbal consent and written consent signed and in chart       Education provided today:   Time Spent on Patient Education: 15 min  PT exam and course of treatment  Bladder and Bowel diary partial review  HEP    Visual Inspection of Perineum:   Excursion of perineal body in cephalad direction with contraction of pelvic floor muscles (PFM): weak  Cotton swab test: non-tender  Sensation: intact    Pelvic Organ Prolapse   no pelvic organ prolapseno pelvic organ prolapse at rest    Pelvic Floor Muscle Exam     Palpation   No increased muscle tension in the pubococcygeus, iIliococcygeus, obturator internus and periurethral  Minimal increased muscle tension in the bulbospongiosus, ischiocavernosus, super transverse perineal and puborectalis  No tenderness on right in the bulbospongiosus, ischiocavernosus, super transverse perineal, puborectalis, pubococcygeus, iliococcygeus, obturator internus and periurethral  No tenderness on left in the bulbospongiosus, ischiocavernosus, super transverse perineal, puborectalis, pubococcygeus, iliococcygeus, obturator internus and periurethral    Muscle Contraction: well isolated  Breathing pattern with contraction: within normal limits  Pelvic floor muscle relaxation is delayed and complete       PERFECT Score   Power right: 2/5  Power left: 2/5  Endurance (seconds to max): 3  Repetitions (before fatigue): 6 Precautions: hx of hysterectomy; hx of colon cancer and colon resection  [employer] HEP    Manuals 3/1 3/8 3/31 5/10                                                             Neuro Re-Ed             PFMC: Slow Holds  10x  10x supine  10x prone         PFMC: Quick Flicks  11F 2x6 57Z         PFMC + hip abd isometrics             PFMC + hip add isometrics             PFMC + ecc bridge             PFMC in sitting             PFMC seated + tilt             PFMC in standing                          TA ADIM             TA + BKFO             TA + SLR             Ther Ex             PPT             Bridge + SLR             Bridge + marching                                                                              Ther Activity             EDUCATION 10 min 15 min 40 min 25 min         PFMC + reach             PFMC + squat             PFMC + lift                                       Gait Training                                       Modalities

## 2022-05-11 ENCOUNTER — ANESTHESIA (OUTPATIENT)
Dept: ANESTHESIOLOGY | Facility: HOSPITAL | Age: 82
End: 2022-05-11

## 2022-05-11 ENCOUNTER — ANESTHESIA EVENT (OUTPATIENT)
Dept: ANESTHESIOLOGY | Facility: HOSPITAL | Age: 82
End: 2022-05-11

## 2022-05-11 PROBLEM — C26.9 GI MALIGNANCY (HCC): Status: ACTIVE | Noted: 2022-05-11

## 2022-05-11 NOTE — ANESTHESIA PREPROCEDURE EVALUATION
Procedure:  PRE-OP ONLY    Relevant Problems   ENDO   (+) Hypothyroidism      GI/HEPATIC   (+) GI malignancy (HCC) (s/p ant resection 1992 )      MUSCULOSKELETAL   (+) Sciatica of right side      Nervous and Auditory   (+) Benign positional vertigo, left             Anesthesia Plan  ASA Score- 2     Anesthesia Type- IV sedation with anesthesia with ASA Monitors  Additional Monitors:   Airway Plan:           Plan Factors-    Chart reviewed  EKG reviewed  Existing labs reviewed  Patient summary reviewed  Induction-     Postoperative Plan-     Informed Consent- Anesthetic plan and risks discussed with patient  I personally reviewed this patient with the CRNA  Discussed and agreed on the Anesthesia Plan with the CRNA             Lab Results   Component Value Date    HGBA1C 5 5 11/01/2021       Lab Results   Component Value Date     07/24/2015    K 3 6 12/02/2021     12/02/2021    CO2 29 12/02/2021    ANIONGAP 10 07/24/2015    BUN 16 12/02/2021    CREATININE 0 75 12/02/2021    GLUCOSE 82 07/24/2015    GLUF 86 11/01/2021    CALCIUM 9 0 12/02/2021    AST 25 11/01/2021    ALT 34 11/01/2021    ALKPHOS 82 11/01/2021    PROT 7 7 07/24/2015    BILITOT 0 56 07/24/2015    EGFR 74 12/02/2021       Lab Results   Component Value Date    WBC 5 25 04/02/2021    HGB 13 9 04/02/2021    HCT 43 1 04/02/2021    MCV 89 04/02/2021     04/02/2021   echo normal EF

## 2022-05-12 ENCOUNTER — RA CDI HCC (OUTPATIENT)
Dept: OTHER | Facility: HOSPITAL | Age: 82
End: 2022-05-12

## 2022-05-12 NOTE — PROGRESS NOTES
[facility]  coding opportunities       Chart reviewed, no opportunity found: CHART REVIEWED, NO OPPORTUNITY FOUND        Patients Insurance     Medicare Insurance: [employer] [facility] Advantage

## 2022-05-13 ENCOUNTER — HOSPITAL ENCOUNTER (OUTPATIENT)
Dept: GASTROENTEROLOGY | Facility: HOSPITAL | Age: 82
Setting detail: OUTPATIENT SURGERY
Discharge: HOME | End: 2022-05-13
Attending: COLON & RECTAL SURGERY
Payer: COMMERCIAL

## 2022-05-13 ENCOUNTER — ANESTHESIA (OUTPATIENT)
Dept: GASTROENTEROLOGY | Facility: HOSPITAL | Age: 82
End: 2022-05-13

## 2022-05-13 ENCOUNTER — ANESTHESIA EVENT (OUTPATIENT)
Dept: GASTROENTEROLOGY | Facility: HOSPITAL | Age: 82
End: 2022-05-13

## 2022-05-13 VITALS
OXYGEN SATURATION: 97 % | TEMPERATURE: 98 F | DIASTOLIC BLOOD PRESSURE: 74 MMHG | RESPIRATION RATE: 18 BRPM | HEART RATE: 75 BPM | SYSTOLIC BLOOD PRESSURE: 142 MMHG

## 2022-05-13 DIAGNOSIS — Z83.71 FAMILY HISTORY OF COLONIC POLYPS: ICD-10-CM

## 2022-05-13 DIAGNOSIS — Z85.038 PERSONAL HISTORY OF OTHER MALIGNANT NEOPLASM OF LARGE INTESTINE: ICD-10-CM

## 2022-05-13 DIAGNOSIS — Z86.010 PERSONAL HISTORY OF COLONIC POLYPS: ICD-10-CM

## 2022-05-13 DIAGNOSIS — Z12.11 ENCOUNTER FOR SCREENING FOR MALIGNANT NEOPLASM OF COLON: ICD-10-CM

## 2022-05-13 RX ORDER — SODIUM CHLORIDE 9 MG/ML
50 INJECTION, SOLUTION INTRAVENOUS CONTINUOUS
Status: DISCONTINUED | OUTPATIENT
Start: 2022-05-13 | End: 2022-05-17 | Stop reason: HOSPADM

## 2022-05-13 RX ORDER — PROPOFOL 10 MG/ML
INJECTION, EMULSION INTRAVENOUS AS NEEDED
Status: DISCONTINUED | OUTPATIENT
Start: 2022-05-13 | End: 2022-05-13

## 2022-05-13 RX ORDER — SODIUM CHLORIDE 9 MG/ML
INJECTION, SOLUTION INTRAVENOUS CONTINUOUS PRN
Status: DISCONTINUED | OUTPATIENT
Start: 2022-05-13 | End: 2022-05-13

## 2022-05-13 RX ADMIN — SIMETHICONE 40 MG: 20 EMULSION ORAL at 09:24

## 2022-05-13 RX ADMIN — PROPOFOL 30 MG: 10 INJECTION, EMULSION INTRAVENOUS at 09:26

## 2022-05-13 RX ADMIN — PROPOFOL 30 MG: 10 INJECTION, EMULSION INTRAVENOUS at 09:22

## 2022-05-13 RX ADMIN — PROPOFOL 20 MG: 10 INJECTION, EMULSION INTRAVENOUS at 09:28

## 2022-05-13 RX ADMIN — SODIUM CHLORIDE: 9 INJECTION, SOLUTION INTRAVENOUS at 09:14

## 2022-05-13 RX ADMIN — PROPOFOL 20 MG: 10 INJECTION, EMULSION INTRAVENOUS at 09:27

## 2022-05-13 RX ADMIN — PROPOFOL 30 MG: 10 INJECTION, EMULSION INTRAVENOUS at 09:24

## 2022-05-13 RX ADMIN — SODIUM CHLORIDE 50 ML/HR: 0.9 INJECTION, SOLUTION INTRAVENOUS at 07:43

## 2022-05-13 RX ADMIN — PROPOFOL 70 MG: 10 INJECTION, EMULSION INTRAVENOUS at 09:20

## 2022-05-13 NOTE — INTERVAL H&P NOTE
H&P reviewed  After examining the patient I find no changes in the patients condition since the H&P had been written      Vitals:    05/13/22 0724   BP: 150/75   Pulse: 75   Resp: 20   Temp: (!) 97 °F (36 1 °C)   SpO2: 96%

## 2022-05-13 NOTE — ANESTHESIA POSTPROCEDURE EVALUATION
Post-Op Assessment Note    CV Status:  Stable    Pain management: adequate     Mental Status:  Alert and awake   Hydration Status:  Euvolemic   PONV Controlled:  Controlled   Airway Patency:  Patent      Post Op Vitals Reviewed: Yes      Staff: CRNA         No complications documented      /58 (05/13/22 0936)    Temp     Pulse 76 (05/13/22 0936)   Resp 16 (05/13/22 0936)    SpO2 99 % (05/13/22 0936)

## 2022-05-13 NOTE — DISCHARGE INSTRUCTIONS
[facility] [address], [address] [place] [address] [address]  Phone: [phone]    DISCHARGE INSTRUCTIONS:  x__ Complete Exam - Normal    2   ___ Exam normal, but entire colon not seen  We will discuss this with you  3   ___ Polyp(s) removed by "burning" - NO pathology report will follow    4   ___ Polyp(s) removed by excision  Pathology report will be available in 4-5 days   Someone from our office will call you with results  5   ___ Exam prompted biopsies  Pathology report will be available in 4-5 days   Someone from our office will call you with results  6   ___ Exam demonstrated findings that need treatment  Prescriptions will be   Given to you  Return to our office in ____ weeks  Please call for appt  7   ___ Original office visit or colonoscopy findings necessitate an office visit  Please call to set up a new appointment    8   ___ Medication  __________________________________________        [address]:    - Go straight home and rest today    - No driving or drinking alcohol for 24 hours    - Resume regular diet and medications unless otherwise instructed  Coumadin and Plavix are blood thinners  You can resume these medications on __________      IF YOU ARE HAVING ANY FEVER, BLEEDING OR PERSISTENT PAIN IN THE ABDOMEN, PLEASE CALL OUR OFFICE ANY DAY OR TIME  [phone]

## 2022-05-13 NOTE — ANESTHESIA PREPROCEDURE EVALUATION
Procedure:  COLONOSCOPY    Relevant Problems   ANESTHESIA (within normal limits)      CARDIO (within normal limits)      ENDO   (+) Hypothyroidism      GI/HEPATIC   (+) GI malignancy (HCC) (s/p ant resection 1992 )      MUSCULOSKELETAL   (+) Sciatica of right side      NEURO/PSYCH (within normal limits)      PULMONARY (within normal limits)   (-) Asthma   (-) Sleep apnea   (-) Smoking        Physical Exam    Airway    Mallampati score: II  TM Distance: >3 FB  Neck ROM: full     Dental   upper dentures and lower dentures,     Cardiovascular  Cardiovascular exam normal    Pulmonary  Pulmonary exam normal     Other Findings        Anesthesia Plan  ASA Score- 2     Anesthesia Type- IV sedation with anesthesia with ASA Monitors  Additional Monitors:   Airway Plan:           Plan Factors-Exercise tolerance (METS): >4 METS  Chart reviewed  EKG reviewed  Existing labs reviewed  Patient summary reviewed  Patient is not a current smoker  Patient not instructed to abstain from smoking on day of procedure  Patient did not smoke on day of surgery  Induction-     Postoperative Plan-     Informed Consent- Anesthetic plan and risks discussed with patient  I personally reviewed this patient with the CRNA  Discussed and agreed on the Anesthesia Plan with the CRNA             Lab Results   Component Value Date    HGBA1C 5 5 11/01/2021       Lab Results   Component Value Date     07/24/2015    K 3 6 12/02/2021     12/02/2021    CO2 29 12/02/2021    ANIONGAP 10 07/24/2015    BUN 16 12/02/2021    CREATININE 0 75 12/02/2021    GLUCOSE 82 07/24/2015    GLUF 86 11/01/2021    CALCIUM 9 0 12/02/2021    AST 25 11/01/2021    ALT 34 11/01/2021    ALKPHOS 82 11/01/2021    PROT 7 7 07/24/2015    BILITOT 0 56 07/24/2015    EGFR 74 12/02/2021       Lab Results   Component Value Date    WBC 5 25 04/02/2021    HGB 13 9 04/02/2021    HCT 43 1 04/02/2021    MCV 89 04/02/2021     04/02/2021   echo normal EF

## 2022-05-13 NOTE — NURSING NOTE
Pt is awake,alert,tolerated diet,written and verbal instructions given, pt verbalizes an understanding, no complaints

## 2022-05-18 RX ORDER — CYCLOPENTOLATE HYDROCHLORIDE 10 MG/ML
SOLUTION/ DROPS OPHTHALMIC
COMMUNITY
Start: 2022-05-04 | End: 2022-05-20 | Stop reason: SDDI

## 2022-05-18 RX ORDER — GATIFLOXACIN 5 MG/ML
SOLUTION/ DROPS OPHTHALMIC
COMMUNITY
Start: 2022-05-04 | End: 2022-05-20 | Stop reason: ALTCHOICE

## 2022-05-19 ENCOUNTER — CONSULT (OUTPATIENT)
Dept: INTERNAL MEDICINE CLINIC | Facility: CLINIC | Age: 82
End: 2022-05-19
Payer: COMMERCIAL

## 2022-05-19 VITALS
HEIGHT: 60 IN | WEIGHT: 120.2 LBS | BODY MASS INDEX: 23.6 KG/M2 | OXYGEN SATURATION: 99 % | DIASTOLIC BLOOD PRESSURE: 78 MMHG | SYSTOLIC BLOOD PRESSURE: 124 MMHG | HEART RATE: 74 BPM

## 2022-05-19 DIAGNOSIS — Z01.818 PRE-OP EVALUATION: Primary | ICD-10-CM

## 2022-05-19 PROCEDURE — 1036F TOBACCO NON-USER: CPT | Performed by: INTERNAL MEDICINE

## 2022-05-19 PROCEDURE — 99213 OFFICE O/P EST LOW 20 MIN: CPT | Performed by: INTERNAL MEDICINE

## 2022-05-19 PROCEDURE — 1160F RVW MEDS BY RX/DR IN RCRD: CPT | Performed by: INTERNAL MEDICINE

## 2022-05-20 PROBLEM — Z01.818 PRE-OP EVALUATION: Status: ACTIVE | Noted: 2022-05-20

## 2022-05-20 NOTE — PROGRESS NOTES
Assessment/Plan:    Pre-op evaluation  Patient getting right TKA through [facility] on 6/16  Greater than 4 mets  Sierra criteria for cardiac risk- 0 points  Gale's criteria (based on previous CMP)- 0 0% JESI risk   Reviewed ECHo performed on 5/5- normal  Patient remains at low risk for moderate risk surgery based on above (METs, Yovani's and Gale's) criteria  Blood work and EKG needs to be followed by the ordering team      Offered POCT EKG today for the patient  Patient refused  Reviewed medications with patient  Asked to stop all herbs and OTC supplements one week before surgery for potential bleeding risk  Diagnoses and all orders for this visit:    Pre-op evaluation    Other orders  -     Discontinue: gatifloxacin (ZYMAXID) 0 5 %; PUT 1 DROP IN OPERATIVE EYE TWICE A DAY 3 DAYS PRIOR TO SURGERY (Patient not taking: Reported on 5/19/2022)  -     Discontinue: cyclopentolate (CYCLOGYL) 1 % ophthalmic solution; PUT 1 DROP INTO OPERATIVE EYE TWICE A DAY DAY PRIOR TO SURGYER AND 1 DROP 1 HOUR PRIOR TO SURGERY (Patient not taking: Reported on 5/19/2022)  -     mupirocin (BACTROBAN) 2 % ointment; Apply topically 2 (two) times a day          Subjective:      Patient ID: Geraldo Lemos is a 78 y o  female  77 y/o pleasant female seen in the office for pre-op evaluation  She is getting right TKA on 6/16  Patient needs to complete pre op evaluation from after 5/17  She has worsening knee pain that is limiting her productivity and from the activities she enjoy most i e  Tennis  She can climb a flight of stairs with out difficulty  She can play tennis if not for her knee pain  No c/o chest pain, SOB, abdominal pain, claudication  No H/o stroke, TIA  No H/o tobacco, alcohol or drug abuse  Takes supplements and herbs OTC            The following portions of the patient's history were reviewed and updated as appropriate: allergies, current medications, past family history, past medical history, past social history, past surgical history and problem list     Review of Systems   Constitutional: Negative  HENT: Negative  Eyes: Negative  Respiratory: Negative  Cardiovascular: Negative  Gastrointestinal: Negative  Endocrine: Negative  Genitourinary: Negative  Musculoskeletal: Positive for arthralgias  Negative for back pain, gait problem, joint swelling, myalgias, neck pain and neck stiffness  Skin: Negative  Neurological: Negative  Hematological: Negative  Psychiatric/Behavioral: Negative  Objective:      /78   Pulse 74   Ht 5' (1 524 m)   Wt 54 5 kg (120 lb 3 2 oz)   SpO2 99%   BMI 23 47 kg/m²            Physical Exam     Physical Exam  Vitals reviewed  Constitutional:       Appearance: Normal appearance  HENT:      Head: Normocephalic and atraumatic  Nose: Nose normal       Mouth/Throat:      Mouth: Mucous membranes are moist       Pharynx: Oropharynx is clear  Eyes:      Extraocular Movements: Extraocular movements intact  Conjunctiva/sclera: Conjunctivae normal    Cardiovascular:      Rate and Rhythm: Normal rate and regular rhythm  Pulses: Normal pulses  Heart sounds: Normal heart sounds  Pulmonary:      Effort: Pulmonary effort is normal       Breath sounds: Normal breath sounds  Abdominal:      General: Bowel sounds are normal       Palpations: Abdomen is soft  Musculoskeletal:         General: Tenderness present  Right lower leg: No edema  Left lower leg: No edema  Skin:     General: Skin is warm and dry  Capillary Refill: Capillary refill takes less than 2 seconds  Neurological:      Mental Status: She is alert and oriented to person, place, and time  Psychiatric:         Mood and Affect: Mood normal          Behavior: Behavior normal          Thought Content:  Thought content normal          Judgment: Judgment normal

## 2022-05-20 NOTE — ASSESSMENT & PLAN NOTE
Patient getting right TKA through [facility] on 6/16  Greater than 4 mets  Sierra criteria for cardiac risk- 0 points  Gale's criteria (based on previous CMP)- 0 0% JESI risk   Reviewed ECHo performed on 5/5- normal  Patient remains at low risk for moderate risk surgery based on above (METs, Yovani's and Gale's) criteria  Blood work and EKG needs to be followed by the ordering team      Offered POCT EKG today for the patient  Patient refused  Reviewed medications with patient  Asked to stop all herbs and OTC supplements one week before surgery for potential bleeding risk

## 2022-05-26 ENCOUNTER — TELEMEDICINE (OUTPATIENT)
Dept: INTERNAL MEDICINE CLINIC | Facility: CLINIC | Age: 82
End: 2022-05-26
Payer: COMMERCIAL

## 2022-05-26 DIAGNOSIS — E83.52 HYPERCALCEMIA: Primary | ICD-10-CM

## 2022-05-26 PROCEDURE — 99213 OFFICE O/P EST LOW 20 MIN: CPT | Performed by: INTERNAL MEDICINE

## 2022-05-26 NOTE — PROGRESS NOTES
COVID-19 Outpatient Progress Note    Assessment/Plan:    Problem List Items Addressed This Visit    None        Disposition:     Discussed vitamin D, vitamin C, and/or zinc supplementation with patient  Patient to continue with supportive care now  Discussed anti-viral options and time line for administration  Roman daniel call back if she would like any of these treatments  I have spent 20 minutes directly with the patient  Encounter provider Gage Stevenson DO    Provider located at [address] [place] [id number]    Recent Visits  No visits were found meeting these conditions  Showing recent visits within past 7 days and meeting all other requirements  Today's Visits  Date Type Provider Dept   05/26/22 Telemedicine Gage Stevenson [address] today's visits and meeting all other requirements  Future Appointments  No visits were found meeting these conditions  Showing future appointments within next 150 days and meeting all other requirements     This virtual check-in was done via telephone and she agrees to proceed  Patient agrees to participate in a virtual check in via telephone or video visit instead of presenting to the office to address urgent/immediate medical needs  Patient is aware this is a billable service  After connecting through Telephone, the patient was identified by name and date of birth  Geraldo Lemos was informed that this was a telemedicine visit and that the exam was being conducted confidentially over secure lines  My office door was closed  No one else was in the room  Geraldo Lemos acknowledged consent and understanding of privacy and security of the telemedicine visit  I informed the patient that I have reviewed her record in Epic and presented the opportunity for her to ask any questions regarding the visit today  The patient agreed to participate      It was my intent to perform this visit via video technology but the patient was not able to do a video connection so the visit was completed via audio telephone only  Verification of patient location:  Patient is located in the following state in which I hold an active license: [place]    Subjective:   Raina Klinefelter is a 78 y o  female who is concerned about COVID-19  Patient's symptoms include chills, cough, myalgias and headache  Patient denies fever, shortness of breath, chest tightness, abdominal pain, nausea and vomiting      - Date of symptom onset: 2022      COVID-19 vaccination status: Fully vaccinated with booster    Highest temperature - 99 6   Tested positive the 25th    Lab Results   Component Value Date    SARSCOV2 Negative 2021     Past Medical History:   Diagnosis Date    Arthritis     Colon cancer [facility])     age 45    Colon polyp      Past Surgical History:   Procedure Laterality Date     SECTION      43 years ago   [address]    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    TOTAL ABDOMINAL HYSTERECTOMY Bilateral     age 37   Roxane Crystal TOTAL ABDOMINAL HYSTERECTOMY W/ BILATERAL SALPINGOOPHORECTOMY Bilateral     fibroids; age 37     Current Outpatient Medications   Medication Sig Dispense Refill    Biotin 1000 MCG tablet Take 1,000 mcg by mouth in the morning        Calcium 500-125 MG-UNIT TABS Take 1 tablet by mouth daily      Cholecalciferol (VITAMIN D3) 1000 units CAPS Take by mouth      COD LIVER OIL PO Take by mouth      cyanocobalamin (VITAMIN B-12) 500 MCG tablet Take 500 mcg by mouth daily      Glucosamine-Chondroit-Vit C-Mn (GLUCOSAMINE 1500 COMPLEX) CAPS Take by mouth      Kelp 150 MCG TABS Take by mouth      Multiple Vitamin (MULTIVITAMIN) tablet Take 1 tablet by mouth daily      mupirocin (BACTROBAN) 2 % ointment Apply topically 2 (two) times a day      Omega-3 Fatty Acids (OMEGA-3 FISH OIL) 1200 MG CAPS Take by mouth      Probiotic Product (PROBIOTIC-10) CAPS Take by mouth      RESTASIS 0 05 % ophthalmic emulsion       zinc gluconate 50 mg tablet Take 50 mg by mouth daily         No current facility-administered medications for this visit  No Known Allergies    Review of Systems   Constitutional: Positive for chills  Negative for fever  Respiratory: Positive for cough  Negative for chest tightness and shortness of breath  Gastrointestinal: Negative for abdominal pain, nausea and vomiting  Musculoskeletal: Positive for myalgias  Neurological: Positive for headaches  Objective: There were no vitals filed for this visit  Physical Exam  Pulmonary:      Comments: Speaking in full uninterrupted sentences without distress  Neurological:      Mental Status: She is alert  VIRTUAL VISIT DISCLAIMER    Megan Mcneal verbally agrees to participate in [employer]  Pt is aware that [employer] could be limited without vital signs or the ability to perform a full hands-on physical Delrae Slim understands she or the provider may request at any time to terminate the video visit and request the patient to seek care or treatment in person

## 2022-05-27 ENCOUNTER — TELEPHONE (OUTPATIENT)
Dept: INTERNAL MEDICINE CLINIC | Facility: CLINIC | Age: 82
End: 2022-05-27

## 2022-05-27 NOTE — TELEPHONE ENCOUNTER
Patient called stating that she has covid  The only symptoms she has is a sore throat on the left and cough  No congestion or anything else  She has a friend who is a hematologist  She recommended Azithromycin 500 mg bid  And Plaquenil 200 mg bid x 7 days  Patient is asking if you could order this for her  She uses [facility] in [place]  If you have any questions you can call the patient

## 2022-05-31 ENCOUNTER — APPOINTMENT (OUTPATIENT)
Dept: PHYSICAL THERAPY | Facility: REHABILITATION | Age: 82
End: 2022-05-31
Payer: COMMERCIAL

## 2022-06-02 ENCOUNTER — TELEPHONE (OUTPATIENT)
Dept: INTERNAL MEDICINE CLINIC | Facility: CLINIC | Age: 82
End: 2022-06-02

## 2022-06-02 NOTE — TELEPHONE ENCOUNTER
Patient had labs and an EKG done for pre-op clearance  [facility] is asking if these can be reviewed and if patient will be cleared for surgery          Please advise      **Please fax medical clearance to [phone]**

## 2022-06-02 NOTE — TELEPHONE ENCOUNTER
Patient tested positive for Covid  (Home Test) 05/23/22    Went to the pharmacy drive thru on 32/92 tested positive  Patient wants to know if she is ok to go out, does she have to wear a mask & for how long      Please leave a message if she doesn't answer    Please Advise   - [phone]

## 2022-06-02 NOTE — TELEPHONE ENCOUNTER
It is scanned in under media  It was originally filled out pending until labs and EKG were reviewed

## 2022-06-03 ENCOUNTER — TELEMEDICINE (OUTPATIENT)
Dept: INTERNAL MEDICINE CLINIC | Facility: CLINIC | Age: 82
End: 2022-06-03
Payer: COMMERCIAL

## 2022-06-03 DIAGNOSIS — U07.1 COVID-19: Primary | ICD-10-CM

## 2022-06-03 PROCEDURE — 99213 OFFICE O/P EST LOW 20 MIN: CPT | Performed by: NURSE PRACTITIONER

## 2022-06-03 NOTE — PROGRESS NOTES
COVID-19 Outpatient Progress Note    Assessment/Plan:    Problem List Items Addressed This Visit    None     Visit Diagnoses     COVID-19    -  Primary         Disposition:     Patient has COVID-19 infection  Based off CDC guidelines, they were recommended to isolate for 5 days from the date of the positive test  If they remain asymptomatic, isolation may be ended followed by 5 days of wearing a mask when around othes to minimize risk of infecting others  If they have a fever, continue to stay home until fever resolves for at least 24 hours  I have spent 15 minutes directly with the patient  Greater than 50% of this time was spent in counseling/coordination of care regarding: instructions for management and impressions  Patient is on day 11 since having symptoms and she is feeling fine  She may end quarantine     Encounter provider CELESTE [place]    Provider located at [id number] [facility] [id number]    Recent Visits  Date Type Provider Dept   06/02/22 Telephone Kemp Signs, [address] [place] [address]   05/27/22 Telephone Justo Signs, [address] recent visits within past 7 days and meeting all other requirements  Today's Visits  Date Type Provider Dept   06/03/22 Telemedicine Matheus Marcus, [address] [place] [address]   Showing today's visits and meeting all other requirements  Future Appointments  No visits were found meeting these conditions  Showing future appointments within next 150 days and meeting all other requirements     This virtual check-in was done via [facility] and patient was informed that this is a secure, HIPAA-compliant platform  She agrees to proceed  Patient agrees to participate in a virtual check in via telephone or video visit instead of presenting to the office to address urgent/immediate medical needs  Patient is aware this is a billable service      After connecting through Televideo, the patient was identified by name and date of birth  Kaylee West was informed that this was a telemedicine visit and that the exam was being conducted confidentially over secure lines  My office door was closed  No one else was in the room  Kaylee West acknowledged consent and understanding of privacy and security of the telemedicine visit  I informed the patient that I have reviewed her record in Epic and presented the opportunity for her to ask any questions regarding the visit today  The patient agreed to participate  Verification of patient location:  Patient is located in the following state in which I hold an active license: [place]    Subjective:   Kaylee West is a 78 y o  female who has been screened for COVID-19  Symptom change since last report: resolving  Patient is currently asymptomatic  Patient denies fever, chills, fatigue, malaise, congestion, rhinorrhea, sore throat, anosmia, loss of taste, cough, shortness of breath, chest tightness, abdominal pain, nausea, vomiting, diarrhea, myalgias and headaches  - Date of symptom onset: 2022  - Date of positive COVID-19 test: 2022  Type of test: Home antigen  COVID-19 vaccination status: Fully vaccinated with booster    Megan has been staying home and has isolated themselves in her home  She is taking care to not share personal items and is cleaning all surfaces that are touched often, like counters, tabletops, and doorknobs using household cleaning sprays or wipes  She is wearing a mask when she leaves her room       Lab Results   Component Value Date    SARSCOV2 Positive (A) 2022     Past Medical History:   Diagnosis Date    Arthritis     Colon cancer [facility])     age 45    Colon polyp      Past Surgical History:   Procedure Laterality Date     SECTION      37 years ago   [facility] [address] [facility]    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    TOTAL ABDOMINAL HYSTERECTOMY Bilateral     age 37   [facility] HYSTERECTOMY W/ BILATERAL SALPINGOOPHORECTOMY Bilateral 1983    fibroids; age 37     Current Outpatient Medications   Medication Sig Dispense Refill    Biotin 1000 MCG tablet Take 1,000 mcg by mouth in the morning   Calcium 500-125 MG-UNIT TABS Take 1 tablet by mouth daily      Cholecalciferol (VITAMIN D3) 1000 units CAPS Take by mouth      COD LIVER OIL PO Take by mouth      cyanocobalamin (VITAMIN B-12) 500 MCG tablet Take 500 mcg by mouth daily      Glucosamine-Chondroit-Vit C-Mn (GLUCOSAMINE 1500 COMPLEX) CAPS Take by mouth      Kelp 150 MCG TABS Take by mouth      Multiple Vitamin (MULTIVITAMIN) tablet Take 1 tablet by mouth daily      mupirocin (BACTROBAN) 2 % ointment Apply topically 2 (two) times a day      Omega-3 Fatty Acids (OMEGA-3 FISH OIL) 1200 MG CAPS Take by mouth      Probiotic Product (PROBIOTIC-10) CAPS Take by mouth      RESTASIS 0 05 % ophthalmic emulsion       zinc gluconate 50 mg tablet Take 50 mg by mouth daily         No current facility-administered medications for this visit  No Known Allergies    Review of Systems   Constitutional: Negative for chills, fatigue and fever  HENT: Negative for congestion, rhinorrhea and sore throat  Respiratory: Negative for cough, chest tightness and shortness of breath  Gastrointestinal: Negative for abdominal pain, diarrhea, nausea and vomiting  Musculoskeletal: Negative for myalgias  Neurological: Negative for headaches  Objective: There were no vitals filed for this visit  Physical Exam  Vitals reviewed  Constitutional:       Appearance: Normal appearance  Neurological:      Mental Status: She is alert  VIRTUAL VISIT DISCLAIMER    Megan Shah verbally agrees to participate in [employer]   Pt is aware that [employer] could be limited without vital signs or the ability to perform a full hands-on physical Pedro Arthur understands she or the provider may request at any time to terminate the video visit and request the patient to seek care or treatment in person

## 2022-06-06 ENCOUNTER — OFFICE VISIT (OUTPATIENT)
Dept: PHYSICAL THERAPY | Facility: REHABILITATION | Age: 82
End: 2022-06-06
Payer: COMMERCIAL

## 2022-06-06 DIAGNOSIS — R39.15 URINARY URGENCY: ICD-10-CM

## 2022-06-06 DIAGNOSIS — R35.0 URINARY FREQUENCY: Primary | ICD-10-CM

## 2022-06-06 DIAGNOSIS — R15.2 FECAL URGENCY: ICD-10-CM

## 2022-06-06 PROCEDURE — 97530 THERAPEUTIC ACTIVITIES: CPT | Performed by: PHYSICAL THERAPIST

## 2022-06-06 NOTE — PROGRESS NOTES
Daily Note     Today's date: 2022  Patient name: Radha Cortes  : 1940  MRN: [id number]  Referring provider: Lorraine Abrams DO  Dx:   Encounter Diagnosis     ICD-10-CM    1  Urinary frequency  R35 0    2  Fecal urgency  R15 2    3  Urinary urgency  R39 15        Start Time: 1500  Stop Time: 1555  Total time in clinic (min): 55 minutes    Subjective: The patient reports that she has not largely seen a change in her fecal urgency and frequency  She also notes that her R sided sciatica is exacerbated  She saw a chiropractor for the first time recently and is hoping that this gives her some relief  She is having a TKA on her right knee next Thursday,   She did have a colonoscopy which she reports that results were normal        Objective: See treatment diary below      Assessment: Tolerated treatment well  Patient education and counseling provided today as she had questions in regards to physiologic processes and muscle function  Also discussed home exercise program going forward as she is going to be continuing with her exercises at home while she is rehabilitating her knee surgery  She was encouraged to call with any questions/concerns but also encouraged to stay consistent with her exercises and that it will take time to strengthen her muscles  Plan: discharge to [facility]        Precautions: hx of hysterectomy; hx of colon cancer and colon resection  [place] [facility]    Manuals 3/1 3/8 3/31 5/10 6/6                                                            Neuro Re-Ed             PFMC: Slow Holds  10x  10x supine  10x prone         [facility]: Quick Flicks  26K 2x6 09T         [facility] + hip abd isometrics             [facility] + hip add isometrics             PF[facility] + ecc bridge             PF[facility] in sitting             PF[facility] seated + tilt             [facility] in standing                          TA ADIM             TA + BKFO             TA + SLR             Ther Ex             PPT             Bridge + SLR Bridge + marching                                                                              Ther Activity             EDUCATION 10 min 15 min 40 min 25 min 45 min        PFMC + reach             PFMC + squat             PF[facility] + lift                                       Gait Training                                       Modalities

## 2022-06-27 NOTE — PROGRESS NOTES
PT Discharge    Today's date: 2022  Patient name: Jesus Mathis  : 1940  MRN: [id number]  Referring provider: Marta Rankin DO  Dx:   Encounter Diagnosis     ICD-10-CM    1  Urinary frequency  R35 0    2  Fecal urgency  R15 2    3  Urinary urgency  R39 15        Start Time: 1500  Stop Time: 1555  Total time in clinic (min): 55 minutes    Assessment/Plan: The patient was treated for a total of 5 visits including her IE on 3/1  She was discharged at her last visit to [facility] at this time as she is planning on having a TKR surgery  She was encouraged to call with any questions/concerns or if she would like to return for future treatment when able/appropriate       Pelvic Floor Subjective     Objective

## 2022-07-27 ENCOUNTER — CONSULT (OUTPATIENT)
Dept: INTERNAL MEDICINE CLINIC | Facility: CLINIC | Age: 82
End: 2022-07-27
Payer: COMMERCIAL

## 2022-07-27 VITALS
HEIGHT: 60 IN | BODY MASS INDEX: 23.01 KG/M2 | OXYGEN SATURATION: 97 % | HEART RATE: 75 BPM | WEIGHT: 117.2 LBS | SYSTOLIC BLOOD PRESSURE: 122 MMHG | DIASTOLIC BLOOD PRESSURE: 68 MMHG | RESPIRATION RATE: 16 BRPM

## 2022-07-27 DIAGNOSIS — Z01.818 PRE-OP EXAM: Primary | ICD-10-CM

## 2022-07-27 PROCEDURE — 99214 OFFICE O/P EST MOD 30 MIN: CPT | Performed by: GENERAL ACUTE CARE HOSPITAL

## 2022-07-27 NOTE — PROGRESS NOTES
Assessment/Plan:    Pre-op exam  Greater than 4 METS  Pt is at standard risk for low risk surgery  No extra work ups needed  Reviewed meds  Diagnoses and all orders for this visit:    Pre-op exam          Subjective:      Patient ID: Kourtney Yin is a 78 y o  female  HPI    Here for pre-op eval    echeduled for cataract surgery 8/3  No major complain  Dr Agustin Interiano      The following portions of the patient's history were reviewed and updated as appropriate: allergies, past family history, past social history and past surgical history  Review of Systems   Constitutional: Negative for chills, fatigue and fever  HENT: Negative for congestion, nosebleeds, postnasal drip, sneezing, sore throat and trouble swallowing  Eyes: Negative for pain  Respiratory: Negative for cough, chest tightness, shortness of breath and wheezing  Cardiovascular: Negative for chest pain, palpitations and leg swelling  Gastrointestinal: Negative for abdominal pain, constipation, diarrhea, nausea and vomiting  Endocrine: Negative for cold intolerance, heat intolerance, polydipsia and polyuria  Genitourinary: Negative for difficulty urinating, dysuria, flank pain and hematuria  Musculoskeletal: Negative for arthralgias and myalgias  Skin: Negative for rash  Neurological: Negative for dizziness, tremors, weakness and headaches  Hematological: Does not bruise/bleed easily  Psychiatric/Behavioral: Negative for confusion and dysphoric mood  The patient is not nervous/anxious  Objective:      /68   Pulse 75   Resp 16   Ht 5' (1 524 m)   Wt 53 2 kg (117 lb 3 2 oz)   SpO2 97%   BMI 22 89 kg/m²          Physical Exam  Constitutional:       General: She is not in acute distress  Appearance: She is well-developed  HENT:      Head: Normocephalic and atraumatic        Right Ear: External ear normal       Left Ear: External ear normal    Eyes:      General: No scleral icterus  Conjunctiva/sclera: Conjunctivae normal    Neck:      Thyroid: No thyromegaly  Trachea: No tracheal deviation  Cardiovascular:      Rate and Rhythm: Normal rate and regular rhythm  Heart sounds: Normal heart sounds  Pulmonary:      Effort: Pulmonary effort is normal  No respiratory distress  Breath sounds: Normal breath sounds  No wheezing or rales  Abdominal:      General: Bowel sounds are normal       Palpations: Abdomen is soft  Tenderness: There is no abdominal tenderness  There is no guarding or rebound  Musculoskeletal:      Cervical back: Normal range of motion and neck supple  Lymphadenopathy:      Cervical: No cervical adenopathy  Neurological:      Mental Status: She is alert and oriented to person, place, and time  Psychiatric:         Behavior: Behavior normal          Thought Content:  Thought content normal          Judgment: Judgment normal

## 2022-07-27 NOTE — ASSESSMENT & PLAN NOTE
Greater than 4 METS  Pt is at standard risk for low risk surgery  No extra work ups needed  Reviewed meds

## 2022-09-06 ENCOUNTER — RA CDI HCC (OUTPATIENT)
Dept: OTHER | Facility: HOSPITAL | Age: 82
End: 2022-09-06

## 2022-09-06 NOTE — PROGRESS NOTES
[facility]  coding opportunities       Chart reviewed, no opportunity found: CHART REVIEWED, NO OPPORTUNITY FOUND        Patients Insurance     Medicare Insurance: [employer] [facility] Ad[employer]age

## 2022-09-13 ENCOUNTER — OFFICE VISIT (OUTPATIENT)
Dept: INTERNAL MEDICINE CLINIC | Facility: CLINIC | Age: 82
End: 2022-09-13
Payer: COMMERCIAL

## 2022-09-13 VITALS
HEIGHT: 60 IN | RESPIRATION RATE: 16 BRPM | OXYGEN SATURATION: 98 % | SYSTOLIC BLOOD PRESSURE: 119 MMHG | BODY MASS INDEX: 23.71 KG/M2 | WEIGHT: 120.8 LBS | DIASTOLIC BLOOD PRESSURE: 70 MMHG | HEART RATE: 64 BPM

## 2022-09-13 DIAGNOSIS — E03.9 HYPOTHYROIDISM, UNSPECIFIED TYPE: Primary | ICD-10-CM

## 2022-09-13 DIAGNOSIS — D50.9 IRON DEFICIENCY ANEMIA, UNSPECIFIED IRON DEFICIENCY ANEMIA TYPE: ICD-10-CM

## 2022-09-13 DIAGNOSIS — Z13.6 SCREENING FOR CARDIOVASCULAR CONDITION: ICD-10-CM

## 2022-09-13 DIAGNOSIS — Z23 NEED FOR SHINGLES VACCINE: ICD-10-CM

## 2022-09-13 DIAGNOSIS — R73.03 PREDIABETES: ICD-10-CM

## 2022-09-13 DIAGNOSIS — R15.9 INCONTINENCE OF FECES, UNSPECIFIED FECAL INCONTINENCE TYPE: ICD-10-CM

## 2022-09-13 DIAGNOSIS — C26.9 GI MALIGNANCY (HCC): ICD-10-CM

## 2022-09-13 DIAGNOSIS — H26.40 SECONDARY CATARACT OF RIGHT EYE, UNSPECIFIED SECONDARY CATARACT TYPE: ICD-10-CM

## 2022-09-13 PROCEDURE — 1160F RVW MEDS BY RX/DR IN RCRD: CPT | Performed by: INTERNAL MEDICINE

## 2022-09-13 PROCEDURE — 99214 OFFICE O/P EST MOD 30 MIN: CPT | Performed by: INTERNAL MEDICINE

## 2022-09-13 RX ORDER — ZOSTER VACCINE RECOMBINANT, ADJUVANTED 50 MCG/0.5
0.5 KIT INTRAMUSCULAR ONCE
Qty: 1 EACH | Refills: 1 | Status: SHIPPED | OUTPATIENT
Start: 2022-09-13 | End: 2022-09-13

## 2022-09-13 RX ORDER — PREDNISOLONE ACETATE 10 MG/ML
SUSPENSION/ DROPS OPHTHALMIC
COMMUNITY
Start: 2022-08-25 | End: 2022-10-04 | Stop reason: ALTCHOICE

## 2022-09-13 NOTE — ASSESSMENT & PLAN NOTE
Postop anemia status post total knee replacement right side excellent progress with physical therapy will recheck CBC she did not take iron

## 2022-09-29 ENCOUNTER — APPOINTMENT (OUTPATIENT)
Dept: LAB | Facility: CLINIC | Age: 82
End: 2022-09-29
Payer: COMMERCIAL

## 2022-09-29 DIAGNOSIS — R89.9 ABNORMAL LABORATORY TEST: Primary | ICD-10-CM

## 2022-09-29 DIAGNOSIS — E83.52 HYPERCALCEMIA: ICD-10-CM

## 2022-09-29 DIAGNOSIS — R73.03 PREDIABETES: ICD-10-CM

## 2022-09-29 DIAGNOSIS — Z13.6 SCREENING FOR CARDIOVASCULAR CONDITION: ICD-10-CM

## 2022-09-29 DIAGNOSIS — E03.9 HYPOTHYROIDISM, UNSPECIFIED TYPE: ICD-10-CM

## 2022-09-29 DIAGNOSIS — R15.9 INCONTINENCE OF FECES, UNSPECIFIED FECAL INCONTINENCE TYPE: ICD-10-CM

## 2022-09-29 DIAGNOSIS — D50.9 IRON DEFICIENCY ANEMIA, UNSPECIFIED IRON DEFICIENCY ANEMIA TYPE: ICD-10-CM

## 2022-09-29 LAB
ALBUMIN SERPL BCP-MCNC: 3.8 G/DL (ref 3.5–5)
ALP SERPL-CCNC: 168 U/L (ref 34–104)
ALT SERPL W P-5'-P-CCNC: 26 U/L (ref 7–52)
ANION GAP SERPL CALCULATED.3IONS-SCNC: 5 MMOL/L (ref 4–13)
AST SERPL W P-5'-P-CCNC: 25 U/L (ref 13–39)
BASOPHILS # BLD AUTO: 0.07 THOUSANDS/ΜL (ref 0–0.1)
BASOPHILS NFR BLD AUTO: 1 % (ref 0–1)
BILIRUB SERPL-MCNC: 0.54 MG/DL (ref 0.2–1)
BUN SERPL-MCNC: 18 MG/DL (ref 5–25)
CALCIUM SERPL-MCNC: 9.4 MG/DL (ref 8.4–10.2)
CHLORIDE SERPL-SCNC: 104 MMOL/L (ref 96–108)
CHOLEST SERPL-MCNC: 156 MG/DL
CO2 SERPL-SCNC: 28 MMOL/L (ref 21–32)
CREAT SERPL-MCNC: 0.54 MG/DL (ref 0.6–1.3)
EOSINOPHIL # BLD AUTO: 0.34 THOUSAND/ΜL (ref 0–0.61)
EOSINOPHIL NFR BLD AUTO: 5 % (ref 0–6)
ERYTHROCYTE [DISTWIDTH] IN BLOOD BY AUTOMATED COUNT: 13.8 % (ref 11.6–15.1)
EST. AVERAGE GLUCOSE BLD GHB EST-MCNC: 120 MG/DL
FERRITIN SERPL-MCNC: 52 NG/ML (ref 8–388)
GFR SERPL CREATININE-BSD FRML MDRD: 86 ML/MIN/1.73SQ M
GLUCOSE P FAST SERPL-MCNC: 94 MG/DL (ref 65–99)
HBA1C MFR BLD: 5.8 %
HCT VFR BLD AUTO: 39.5 % (ref 34.8–46.1)
HDLC SERPL-MCNC: 80 MG/DL
HGB BLD-MCNC: 12.4 G/DL (ref 11.5–15.4)
IMM GRANULOCYTES # BLD AUTO: 0.03 THOUSAND/UL (ref 0–0.2)
IMM GRANULOCYTES NFR BLD AUTO: 1 % (ref 0–2)
IRON SATN MFR SERPL: 25 % (ref 15–50)
IRON SERPL-MCNC: 88 UG/DL (ref 50–170)
LDLC SERPL CALC-MCNC: 58 MG/DL (ref 0–100)
LYMPHOCYTES # BLD AUTO: 1.46 THOUSANDS/ΜL (ref 0.6–4.47)
LYMPHOCYTES NFR BLD AUTO: 23 % (ref 14–44)
MCH RBC QN AUTO: 26.5 PG (ref 26.8–34.3)
MCHC RBC AUTO-ENTMCNC: 31.4 G/DL (ref 31.4–37.4)
MCV RBC AUTO: 84 FL (ref 82–98)
MONOCYTES # BLD AUTO: 0.47 THOUSAND/ΜL (ref 0.17–1.22)
MONOCYTES NFR BLD AUTO: 8 % (ref 4–12)
NEUTROPHILS # BLD AUTO: 3.87 THOUSANDS/ΜL (ref 1.85–7.62)
NEUTS SEG NFR BLD AUTO: 62 % (ref 43–75)
NRBC BLD AUTO-RTO: 0 /100 WBCS
PLATELET # BLD AUTO: 285 THOUSANDS/UL (ref 149–390)
PMV BLD AUTO: 10.5 FL (ref 8.9–12.7)
POTASSIUM SERPL-SCNC: 3.7 MMOL/L (ref 3.5–5.3)
PROT SERPL-MCNC: 7.5 G/DL (ref 6.4–8.4)
RBC # BLD AUTO: 4.68 MILLION/UL (ref 3.81–5.12)
SODIUM SERPL-SCNC: 137 MMOL/L (ref 135–147)
T4 FREE SERPL-MCNC: 2.1 NG/DL (ref 0.76–1.46)
TIBC SERPL-MCNC: 355 UG/DL (ref 250–450)
TRIGL SERPL-MCNC: 92 MG/DL
TSH SERPL DL<=0.05 MIU/L-ACNC: <0.01 UIU/ML (ref 0.45–4.5)
WBC # BLD AUTO: 6.24 THOUSAND/UL (ref 4.31–10.16)

## 2022-09-29 PROCEDURE — 80061 LIPID PANEL: CPT

## 2022-09-29 PROCEDURE — 83550 IRON BINDING TEST: CPT

## 2022-09-29 PROCEDURE — 83036 HEMOGLOBIN GLYCOSYLATED A1C: CPT

## 2022-09-29 PROCEDURE — 85025 COMPLETE CBC W/AUTO DIFF WBC: CPT

## 2022-09-29 PROCEDURE — 82728 ASSAY OF FERRITIN: CPT

## 2022-09-29 PROCEDURE — 36415 COLL VENOUS BLD VENIPUNCTURE: CPT

## 2022-09-29 PROCEDURE — 80053 COMPREHEN METABOLIC PANEL: CPT

## 2022-09-29 PROCEDURE — 83540 ASSAY OF IRON: CPT

## 2022-09-29 PROCEDURE — 84443 ASSAY THYROID STIM HORMONE: CPT

## 2022-09-29 PROCEDURE — 84439 ASSAY OF FREE THYROXINE: CPT

## 2022-10-03 ENCOUNTER — RA CDI HCC (OUTPATIENT)
Dept: OTHER | Facility: HOSPITAL | Age: 82
End: 2022-10-03

## 2022-10-03 ENCOUNTER — APPOINTMENT (OUTPATIENT)
Dept: LAB | Facility: CLINIC | Age: 82
End: 2022-10-03
Payer: COMMERCIAL

## 2022-10-03 DIAGNOSIS — R89.9 ABNORMAL LABORATORY TEST: ICD-10-CM

## 2022-10-03 PROBLEM — K59.9 FUNCTIONAL BOWEL DISORDER: Status: ACTIVE | Noted: 2022-10-03

## 2022-10-03 LAB
T4 FREE SERPL-MCNC: 1.81 NG/DL (ref 0.76–1.46)
T4 FREE SERPL-MCNC: 1.81 NG/DL (ref 0.76–1.46)
TSH SERPL DL<=0.05 MIU/L-ACNC: <0.01 UIU/ML (ref 0.45–4.5)

## 2022-10-03 PROCEDURE — 84445 ASSAY OF TSI GLOBULIN: CPT

## 2022-10-03 PROCEDURE — 84439 ASSAY OF FREE THYROXINE: CPT

## 2022-10-03 PROCEDURE — 36415 COLL VENOUS BLD VENIPUNCTURE: CPT

## 2022-10-03 PROCEDURE — 84443 ASSAY THYROID STIM HORMONE: CPT

## 2022-10-03 NOTE — PROGRESS NOTES
[facility]  coding opportunities       Chart reviewed, no opportunity found: CHART REVIEWED, NO OPPORTUNITY FOUND        Patients Insurance     Medicare Insurance: [employer] [facility] Advantage

## 2022-10-05 ENCOUNTER — TELEPHONE (OUTPATIENT)
Dept: INTERNAL MEDICINE CLINIC | Facility: CLINIC | Age: 82
End: 2022-10-05

## 2022-10-06 ENCOUNTER — OFFICE VISIT (OUTPATIENT)
Dept: INTERNAL MEDICINE CLINIC | Facility: CLINIC | Age: 82
End: 2022-10-06
Payer: COMMERCIAL

## 2022-10-06 VITALS
HEART RATE: 62 BPM | DIASTOLIC BLOOD PRESSURE: 72 MMHG | RESPIRATION RATE: 16 BRPM | SYSTOLIC BLOOD PRESSURE: 119 MMHG | OXYGEN SATURATION: 99 % | WEIGHT: 122 LBS | BODY MASS INDEX: 23.95 KG/M2 | HEIGHT: 60 IN

## 2022-10-06 DIAGNOSIS — Z23 NEED FOR VACCINATION: ICD-10-CM

## 2022-10-06 DIAGNOSIS — E55.9 VITAMIN D DEFICIENCY: ICD-10-CM

## 2022-10-06 DIAGNOSIS — R73.03 PREDIABETES: ICD-10-CM

## 2022-10-06 DIAGNOSIS — R89.9 ABNORMAL LABORATORY TEST: ICD-10-CM

## 2022-10-06 DIAGNOSIS — E05.90 HYPERTHYROIDISM: Primary | ICD-10-CM

## 2022-10-06 PROCEDURE — G0008 ADMIN INFLUENZA VIRUS VAC: HCPCS

## 2022-10-06 PROCEDURE — 90662 IIV NO PRSV INCREASED AG IM: CPT

## 2022-10-06 PROCEDURE — 99214 OFFICE O/P EST MOD 30 MIN: CPT | Performed by: INTERNAL MEDICINE

## 2022-10-06 NOTE — PROGRESS NOTES
Assessment/Plan:    Hyperthyroidism  Rule out Graves disease versus related to [address] the results of T SI there is a family history of Graves currently she is asymptomatic will order endocrinology consult she would prefer no meds at this point she will discontinue the kelp will recheck laboratories in 4 weeks if she develops any heart racing anxiety heat intolerance please notify me immediately    Vitamin D deficiency  Will check a vitamin-D level    Prediabetes  Pre diabetes -I have counseled the patient to follow a healthy balanced diet, I have counseled patient reduce carbohydrates and sweets in the diet, I would like the patient exercise routinely  I will be checking hemoglobin A1c and comprehensive metabolic panel  Have counseled patient about the prevention of diabetes, and the risk of progression to type 2 diabetes  Abnormal laboratory test  Will check a vitamin-D level and also GGT regarding the elevated alkaline phosphatase level might be related to recent operation         Problem List Items Addressed This Visit        Endocrine    Hyperthyroidism - Primary     Rule out Graves disease versus related to [address] the results of T SI there is a family history of Graves currently she is asymptomatic will order endocrinology consult she would prefer no meds at this point she will discontinue the kelp will recheck laboratories in 4 weeks if she develops any heart racing anxiety heat intolerance please notify me immediately         Relevant Orders    TSH, 3rd generation    T4, free    NM thyroid imaging w uptake(s)    Ambulatory Referral to Endocrinology       Other    Prediabetes     Pre diabetes -I have counseled the patient to follow a healthy balanced diet, I have counseled patient reduce carbohydrates and sweets in the diet, I would like the patient exercise routinely  I will be checking hemoglobin A1c and comprehensive metabolic panel    Have counseled patient about the prevention of diabetes, and the risk of progression to type 2 diabetes  Relevant Orders    Hemoglobin A1C    Abnormal laboratory test     Will check a vitamin-D level and also GGT regarding the elevated alkaline phosphatase level might be related to recent operation         Relevant Orders    Gamma GT    Vitamin A    Vitamin D 25 hydroxy    Vitamin D deficiency     Will check a vitamin-D level         Relevant Orders    Vitamin D 25 hydroxy      Other Visit Diagnoses     Need for vaccination        Relevant Orders    influenza vaccine, high-dose, PF 0 7 mL (FLUZONE HIGH-DOSE) (Completed)          RTO in 2 months call if any problem length of visit 30 minutes greater than half the visit was spent counseled patient regarding the laboratory findings regarding to the low TSH and workup and diagnostic workup of hyperthyroidism  Subjective:      Patient ID: Chantal Valladares is a 78 y o  female  HPI 78-year old female coming in for a follow up office visit regarding hyperthyroidism, vitamin-D deficiency, abnormal laboratory and prediabetes; The patient reports me compliant taking medications without untoward side effects the  The patient is here to review his medical condition, update me on the medical condition and the patient reports me no hospitalizations and no ER visits    No heart racing no heat intolerance no anxiety overall she is feeling well she does report me a family history of Graves but she has been using a kelp product over last several months  report me over last 6 months she has been taking help tablets 150 mcg on a daily basis; reports me that she was taking this to counteract the lack of iodine she had spoke to her colon rectal doctor Dr Mora Romano last week who recommended she stop all her supplements except vitamin-D and since this visit she has stop the kelp tablets currently she does not report me heart racing no nervousness no jugular ring no hair loss no hot flushes    The following portions of the patient's history were reviewed and updated as appropriate: allergies, current medications, past family history, past medical history, past social history, past surgical history and problem list     Review of Systems   Constitutional: Negative for activity change, appetite change and unexpected weight change  HENT: Negative for congestion and postnasal drip  Eyes: Negative for visual disturbance  Respiratory: Negative for cough and shortness of breath  Cardiovascular: Negative for chest pain  Gastrointestinal: Negative for abdominal pain, diarrhea, nausea and vomiting  Neurological: Negative for dizziness, light-headedness and headaches  Hematological: Negative for adenopathy  Objective:    Return in about 2 months (around 2022)  No results found  No Known Allergies    Past Medical History:   Diagnosis Date   • Arthritis    • Colon cancer [facility])     age 45   • Colon polyp      Past Surgical History:   Procedure Laterality Date   •  SECTION      43 years ago   • COLON SURGERY      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   • TOTAL ABDOMINAL HYSTERECTOMY Bilateral     age 37   • TOTAL ABDOMINAL HYSTERECTOMY W/ BILATERAL SALPINGOOPHORECTOMY Bilateral     fibroids; age 37     Current Outpatient Medications on File Prior to Visit   Medication Sig Dispense Refill   • Biotin 1000 MCG tablet Take 1,000 mcg by mouth in the morning       • Calcium 500-125 MG-UNIT TABS Take 1 tablet by mouth daily     • Cholecalciferol (VITAMIN D3) 1000 units CAPS Take by mouth     • COD LIVER OIL PO Take by mouth     • cyanocobalamin (VITAMIN B-12) 500 MCG tablet Take 500 mcg by mouth daily     • Glucosamine-Chondroit-Vit C-Mn (GLUCOSAMINE 1500 COMPLEX) CAPS Take by mouth     • Kelp 150 MCG TABS Take by mouth     • Multiple Vitamin (MULTIVITAMIN) tablet Take 1 tablet by mouth daily     • Omega-3 Fatty Acids (OMEGA-3 FISH OIL) 1200 MG CAPS Take by mouth     • Probiotic Product (PROBIOTIC-10) CAPS Take by mouth     • RESTASIS 0 05 % ophthalmic emulsion      • zinc gluconate 50 mg tablet Take 50 mg by mouth daily         No current facility-administered medications on file prior to visit  Family History   Problem Relation Age of Onset   • No Known Problems Mother    • No Known Problems Father    • Cancer Family    • Lung cancer Sister 80   • Colon cancer Sister 79   • Breast cancer Sister 61   • No Known Problems Daughter    • No Known Problems Maternal Aunt    • No Known Problems Maternal Grandmother    • No Known Problems Maternal Grandfather    • No Known Problems Paternal Grandmother    • No Known Problems Paternal Grandfather      Social History     Socioeconomic History   • Marital status:       Spouse name: Not on file   • Number of children: Not on file   • Years of education: Not on file   • Highest education level: Not on file   Occupational History   • Not on file   Tobacco Use   • Smoking status: Former Smoker     Packs/day: 0 25     Years: 17 00     Pack years: 4 25     Start date:      Quit date:      Years since quittin 8   • Smokeless tobacco: Never Used   Vaping Use   • Vaping Use: Never used   Substance and Sexual Activity   • Alcohol use: No   • Drug use: No   • Sexual activity: Never   Other Topics Concern   • Not on file   Social History Narrative   • Not on file     Social Determinants of Health     Financial Resource Strain: Not on file   Food Insecurity: Not on file   Transportation Needs: Not on file   Physical Activity: Not on file   Stress: Not on file   Social Connections: Not on file   Intimate Partner Violence: Not on file   Housing Stability: Not on file     Vitals:    10/06/22 1012   BP: 119/72   Pulse: 62   Resp: 16   SpO2: 99%   Weight: 55 3 kg (122 lb)   Height: 5' (1 524 m)     Results for orders placed or performed in visit on 10/03/22   TSH, 3rd generation with Free T4 reflex   Result Value Ref Range    TSH 3RD GENERATON <0 010 (L) 0 450 - 4 500 uIU/mL   Thyroid stimulating immunoglobulin   Result Value Ref Range    TSI 1 19 (H) 0 00 - 0 55 IU/L   T4, free   Result Value Ref Range    Free T4 1 81 (H) 0 76 - 1 46 ng/dL   T4, free   Result Value Ref Range    Free T4 1 81 (H) 0 76 - 1 46 ng/dL     Weight (last 2 days)     None        Body mass index is 23 83 kg/m²  BP      Temp      Pulse     Resp      SpO2        Vitals:    10/06/22 1012   Weight: 55 3 kg (122 lb)     Vitals:    10/06/22 1012   Weight: 55 3 kg (122 lb)       /72   Pulse 62   Resp 16   Ht 5' (1 524 m)   Wt 55 3 kg (122 lb)   SpO2 99%   BMI 23 83 kg/m²          Physical Exam  Constitutional:       Appearance: She is well-developed  HENT:      Head: Normocephalic  Eyes:      General: No scleral icterus  Right eye: No discharge  Left eye: No discharge  Conjunctiva/sclera: Conjunctivae normal       Pupils: Pupils are equal, round, and reactive to light  Cardiovascular:      Rate and Rhythm: Normal rate and regular rhythm  Heart sounds: Normal heart sounds  No murmur heard  No friction rub  No gallop  Pulmonary:      Effort: No respiratory distress  Breath sounds: Normal breath sounds  No wheezing or rales  Abdominal:      General: Bowel sounds are normal  There is no distension  Palpations: Abdomen is soft  There is no mass  Tenderness: There is no abdominal tenderness  There is no guarding or rebound  Musculoskeletal:         General: No deformity  Cervical back: Neck supple  Lymphadenopathy:      Cervical: No cervical adenopathy  Neurological:      Mental Status: She is alert        Coordination: Coordination normal        mild fine motor resting tremor, DTRs 3/4 bilateral mild hyperreflexia

## 2022-10-07 ENCOUNTER — TELEPHONE (OUTPATIENT)
Dept: INTERNAL MEDICINE CLINIC | Facility: CLINIC | Age: 82
End: 2022-10-07

## 2022-10-07 NOTE — TELEPHONE ENCOUNTER
Patient is calling in regarding the imaging you ordered for her  She is scheduled for the NM thyroid scan as well as the Dexa scan on 11/3  Patient is also scheduled for her mammogram on 11/15  She would like to know if this is ok or if this will be too much exposure? Patient also asking when you want her to have the blood work done you ordered?           Please advise

## 2022-10-08 LAB — TSI SER-ACNC: 1.19 IU/L (ref 0–0.55)

## 2022-10-09 PROBLEM — R89.9 ABNORMAL LABORATORY TEST: Status: ACTIVE | Noted: 2022-10-09

## 2022-10-09 PROBLEM — E55.9 VITAMIN D DEFICIENCY: Status: ACTIVE | Noted: 2022-10-09

## 2022-10-09 NOTE — ASSESSMENT & PLAN NOTE
Rule out Graves disease versus related to [address] the results of T SI there is a family history of Graves currently she is asymptomatic will order endocrinology consult she would prefer no meds at this point she will discontinue the kelp will recheck laboratories in 4 weeks if she develops any heart racing anxiety heat intolerance please notify me immediately

## 2022-10-09 NOTE — ASSESSMENT & PLAN NOTE
Will check a vitamin-D level and also GGT regarding the elevated alkaline phosphatase level might be related to recent operation

## 2022-10-10 ENCOUNTER — CONSULT (OUTPATIENT)
Dept: INTERNAL MEDICINE CLINIC | Facility: CLINIC | Age: 82
End: 2022-10-10
Payer: COMMERCIAL

## 2022-10-10 VITALS
DIASTOLIC BLOOD PRESSURE: 84 MMHG | OXYGEN SATURATION: 99 % | HEIGHT: 60 IN | HEART RATE: 87 BPM | WEIGHT: 121.2 LBS | SYSTOLIC BLOOD PRESSURE: 142 MMHG | BODY MASS INDEX: 23.8 KG/M2

## 2022-10-10 DIAGNOSIS — Z01.818 PRE-OP EXAM: Primary | ICD-10-CM

## 2022-10-10 PROCEDURE — 99213 OFFICE O/P EST LOW 20 MIN: CPT | Performed by: GENERAL ACUTE CARE HOSPITAL

## 2022-10-10 NOTE — PROGRESS NOTES
Name: Albaro Mcdaniel      : 1940      MRN: [id number]  Encounter Provider: Jazmyne Jimenez MD  Encounter Date: 10/10/2022   [facility]    Assessment & Plan     1  Pre-op exam  Assessment & Plan:  Pt is at standard risk for low risk surgery  No further workups needed  Reviewed meds  Subjective      HPI   Here for pre op  Right cataract surgery on 10/31  No change since I saw her in July   Had left cataract surgery done in Aug  Tolerated well  Review of Systems   Constitutional: Negative for chills, fatigue and fever  HENT: Negative for congestion, nosebleeds, postnasal drip, sneezing, sore throat and trouble swallowing  Eyes: Negative for pain  Respiratory: Negative for cough, chest tightness, shortness of breath and wheezing  Cardiovascular: Negative for chest pain, palpitations and leg swelling  Gastrointestinal: Negative for abdominal pain, constipation, diarrhea, nausea and vomiting  Endocrine: Negative for cold intolerance, heat intolerance, polydipsia and polyuria  Genitourinary: Negative for difficulty urinating, dysuria, flank pain and hematuria  Musculoskeletal: Negative for arthralgias and myalgias  Skin: Negative for rash  Neurological: Negative for dizziness, tremors, weakness and headaches  Hematological: Does not bruise/bleed easily  Psychiatric/Behavioral: Negative for confusion and dysphoric mood  The patient is not nervous/anxious  Current Outpatient Medications on File Prior to Visit   Medication Sig   • Carboxymethylcellulose Sodium (RETAINE [facility] OP) Apply to eye   • Cholecalciferol (VITAMIN D3) 1000 units CAPS Take by mouth   • Probiotic Product (PROBIOTIC-10) CAPS Take by mouth   • RESTASIS 0 05 % ophthalmic emulsion    • Biotin 1000 MCG tablet Take 1,000 mcg by mouth in the morning   (Patient not taking: Reported on 10/10/2022)   • Calcium 500-125 MG-UNIT TABS Take 1 tablet by mouth daily (Patient not taking: Reported on 10/10/2022)   • COD LIVER OIL PO Take by mouth (Patient not taking: Reported on 10/10/2022)   • cyanocobalamin (VITAMIN B-12) 500 MCG tablet Take 500 mcg by mouth daily (Patient not taking: Reported on 10/10/2022)   • Glucosamine-Chondroit-Vit C-Mn (GLUCOSAMINE 1500 COMPLEX) CAPS Take by mouth (Patient not taking: Reported on 10/10/2022)   • Kelp 150 MCG TABS Take by mouth (Patient not taking: Reported on 10/10/2022)   • Multiple Vitamin (MULTIVITAMIN) tablet Take 1 tablet by mouth daily (Patient not taking: Reported on 10/10/2022)   • Omega-3 Fatty Acids (OMEGA-3 FISH OIL) 1200 MG CAPS Take by mouth (Patient not taking: Reported on 10/10/2022)   • zinc gluconate 50 mg tablet Take 50 mg by mouth daily   (Patient not taking: Reported on 10/10/2022)       Objective     /84   Pulse 87   Ht 5' (1 524 m)   Wt 55 kg (121 lb 3 2 oz)   SpO2 99%   BMI 23 67 kg/m²     Physical Exam  Constitutional:       General: She is not in acute distress  Appearance: She is well-developed  HENT:      Head: Normocephalic and atraumatic  Right Ear: External ear normal       Left Ear: External ear normal    Eyes:      General: No scleral icterus  Conjunctiva/sclera: Conjunctivae normal    Neck:      Thyroid: No thyromegaly  Trachea: No tracheal deviation  Cardiovascular:      Rate and Rhythm: Normal rate and regular rhythm  Heart sounds: Normal heart sounds  Pulmonary:      Effort: Pulmonary effort is normal  No respiratory distress  Breath sounds: Normal breath sounds  No wheezing or rales  Abdominal:      General: Bowel sounds are normal       Palpations: Abdomen is soft  Tenderness: There is no abdominal tenderness  There is no guarding or rebound  Musculoskeletal:      Cervical back: Normal range of motion and neck supple  Lymphadenopathy:      Cervical: No cervical adenopathy     Neurological:      Mental Status: She is alert and oriented to person, place, and time    Psychiatric:         Behavior: Behavior normal          Thought Content:  Thought content normal          Judgment: Judgment normal        Abner Villanueva MD

## 2022-11-03 ENCOUNTER — APPOINTMENT (OUTPATIENT)
Dept: LAB | Facility: CLINIC | Age: 82
End: 2022-11-03

## 2022-11-03 ENCOUNTER — HOSPITAL ENCOUNTER (OUTPATIENT)
Dept: NUCLEAR MEDICINE | Facility: HOSPITAL | Age: 82
Discharge: HOME | End: 2022-11-03

## 2022-11-03 DIAGNOSIS — E05.90 HYPERTHYROIDISM: ICD-10-CM

## 2022-11-03 DIAGNOSIS — R89.9 ABNORMAL LABORATORY TEST: ICD-10-CM

## 2022-11-03 DIAGNOSIS — E55.9 VITAMIN D DEFICIENCY: ICD-10-CM

## 2022-11-03 LAB
25(OH)D3 SERPL-MCNC: 54.2 NG/ML (ref 30–100)
GGT SERPL-CCNC: 78 U/L (ref 5–85)
T4 FREE SERPL-MCNC: 2.29 NG/DL (ref 0.76–1.46)
TSH SERPL DL<=0.05 MIU/L-ACNC: <0.01 UIU/ML (ref 0.45–4.5)

## 2022-11-04 ENCOUNTER — TELEPHONE (OUTPATIENT)
Dept: ENDOCRINOLOGY | Facility: CLINIC | Age: 82
End: 2022-11-04

## 2022-11-04 NOTE — TELEPHONE ENCOUNTER
Pt called stated Mi Hunter had contacted her and that she is suppose to have an appt in Dec she believes on the 13th  Let her know I did not see anything for that  Asked for me to contact Mi Hunter and have her call pt back with status of appt    Please contact pt

## 2022-11-04 NOTE — TELEPHONE ENCOUNTER
Pt left v/m stating someone called her regarding upcoming appt that needed to be changed  Returned pt's v/m and left msg for her

## 2022-11-15 ENCOUNTER — HOSPITAL ENCOUNTER (OUTPATIENT)
Dept: RADIOLOGY | Facility: HOSPITAL | Age: 82
Discharge: HOME | End: 2022-11-15

## 2022-11-15 VITALS — WEIGHT: 121 LBS | BODY MASS INDEX: 23.75 KG/M2 | HEIGHT: 60 IN

## 2022-11-15 DIAGNOSIS — Z12.31 ENCOUNTER FOR SCREENING MAMMOGRAM FOR MALIGNANT NEOPLASM OF BREAST: ICD-10-CM

## 2022-11-16 LAB — VIT A SERPL-MCNC: 38.9 UG/DL (ref 22–69.5)

## 2022-11-17 ENCOUNTER — TELEPHONE (OUTPATIENT)
Dept: OTHER | Facility: OTHER | Age: 82
End: 2022-11-17

## 2022-11-17 ENCOUNTER — NURSE TRIAGE (OUTPATIENT)
Dept: OTHER | Facility: OTHER | Age: 82
End: 2022-11-17

## 2022-11-17 NOTE — TELEPHONE ENCOUNTER
Unsuccessful in contacting patient with concerns of child returning home who is Covid positive  Please follow up with patient for risk of exposure, isolation guidelines, and care advise regarding patient's health  Reason for Disposition  • Third attempt to contact caller AND no contact made  Phone number verified      Protocols used: NO CONTACT OR DUPLICATE CONTACT CALL-ADULT-[place]

## 2022-11-17 NOTE — TELEPHONE ENCOUNTER
Regarding: Covid advise  ----- Message from Sury Victor sent at 11/17/2022 12:14 PM EST -----  " My Daughter is coming home and she's Covid Positive  She is in her 3rd day of it   I need advise "

## 2022-11-21 PROBLEM — R15.1 FECAL SMEARING: Status: ACTIVE | Noted: 2022-11-21

## 2022-11-29 ENCOUNTER — HOSPITAL ENCOUNTER (OUTPATIENT)
Dept: NUCLEAR MEDICINE | Facility: HOSPITAL | Age: 82
Discharge: HOME | End: 2022-11-29

## 2022-11-30 ENCOUNTER — HOSPITAL ENCOUNTER (OUTPATIENT)
Dept: NUCLEAR MEDICINE | Facility: HOSPITAL | Age: 82
Discharge: HOME | End: 2022-11-30

## 2022-12-22 ENCOUNTER — CONSULT (OUTPATIENT)
Dept: ENDOCRINOLOGY | Facility: CLINIC | Age: 82
End: 2022-12-22

## 2022-12-22 VITALS
HEART RATE: 97 BPM | OXYGEN SATURATION: 70 % | HEIGHT: 60 IN | SYSTOLIC BLOOD PRESSURE: 118 MMHG | TEMPERATURE: 98.6 F | BODY MASS INDEX: 24.7 KG/M2 | DIASTOLIC BLOOD PRESSURE: 84 MMHG | WEIGHT: 125.8 LBS

## 2022-12-22 DIAGNOSIS — M85.80 OSTEOPENIA, UNSPECIFIED LOCATION: ICD-10-CM

## 2022-12-22 DIAGNOSIS — E55.9 VITAMIN D DEFICIENCY: ICD-10-CM

## 2022-12-22 DIAGNOSIS — E05.90 HYPERTHYROIDISM: Primary | ICD-10-CM

## 2022-12-22 PROBLEM — E03.9 HYPOTHYROIDISM: Status: RESOLVED | Noted: 2019-05-06 | Resolved: 2022-12-22

## 2022-12-22 NOTE — PATIENT INSTRUCTIONS
Methimazole (By mouth)   Methimazole (meth-IM-a-zole)  Treats hyperthyroidism (too much thyroid hormone produced by the thyroid gland)  Brand Name(s): Tapazole   There may be other brand names for this medicine  When This Medicine Should Not Be Used: This medicine is not right for everyone  Do not use it if you had an allergic reaction to methimazole  How to Use This Medicine:   Tablet  Your doctor will tell you how much medicine to use  Do not use more than directed  Missed dose: Take a dose as soon as you remember  If it is almost time for your next dose, wait until then and take a regular dose  Do not take extra medicine to make up for a missed dose  Store the medicine in a closed container at room temperature, away from heat, moisture, and direct light  Drugs and Foods to Avoid:   Ask your doctor or pharmacist before using any other medicine, including over-the-counter medicines, vitamins, and herbal products  Some medicines can affect how methimazole works  Tell your doctor if you are using any of the following:  Digitalis, theophylline  Beta blockers  Blood thinner (including warfarin)  Warnings While Using This Medicine:   Using this medicine during the first three months of pregnancy can harm your unborn baby  Use an effective form of birth control to keep from getting pregnant  If you think you have become pregnant while using the medicine, tell your doctor right away  Tell your doctor if you are breastfeeding, or if you have kidney disease, liver disease, lung disease, or breathing problems  This medicine may cause the following problems:  Liver problems  Hypothyroidism (low levels of thyroid in the blood)  Vasculitis (swelling of the blood vessels)  Make sure any doctor or dentist who treats you knows that you are using this medicine  This medicine may make you bleed, bruise, or get infections more easily  Take precautions to prevent illness and injury  Wash your hands often    Your doctor will do lab tests at regular visits to check on the effects of this medicine  Keep all appointments  Keep all medicine out of the reach of children  Never share your medicine with anyone  Possible Side Effects While Using This Medicine:   Call your doctor right away if you notice any of these side effects:  Chest pain or tightness, troubled breathing, coughing up blood  Dark or bloody urine, pale stools, loss of appetite, stomach pain, yellow skin or eyes  Fever, chills, cough, sore throat, body aches  Numbness, tingling, or burning in your hands or feet  Severe skin rash, redness, or itching  Unusual bruising, bleeding, or weakness  If you notice these less serious side effects, talk with your doctor:   Dizziness  Joint pain  Mild nausea, vomiting, stomach upset  If you notice other side effects that you think are caused by this medicine, tell your doctor  Call your doctor for medical advice about side effects  You may report side effects to FDA at 7-387-FDA-0527    © Copyright [employer] [facility] 2022 Information is for End User's use only and may not be sold, redistributed or otherwise used for commercial purposes  The above information is an  only  It is not intended as medical advice for individual conditions or treatments  Talk to your doctor, nurse or pharmacist before following any medical regimen to see if it is safe and effective for you

## 2022-12-22 NOTE — PROGRESS NOTES
New Consult note      CC: Graves disease    History of Present Illness:   78-year-old female with osteopenia, vitamin D deficiency, DJD, diabetes, colonic polyps and recently diagnosed mild hyperthyroidism  She is essentially asymptomatic  Recent nuclear uptake scan was unremarkable  No History of external radiation, recent Iodine loading or radiological diagnostic studies  No difficulty with swallowing or breathing or change in voice  She stopped using kelp and seaweed for last 3 weeks    Family Hx of thyroid:nephew - graves  Mother - hypothyroidism  Sister - hypothyroidism      Patient Active Problem List   Diagnosis   • Medicare annual wellness visit, subsequent   • Osteopenia   • Fall   • Benign positional vertigo, left   • Head injury   • Hypothyroidism   • Prediabetes   • Screening for cardiovascular condition   • Absence of bladder continence   • Cellulitis of pubic region   • Venous insufficiency   • Skin lesion   • Urine incontinence   • Chronic pain of right knee   • Sciatica of right side   • Lower limb pain, posterior, right   • Low blood potassium   • After cataract, right eye   • GI malignancy (HCC)   • Pre-op exam   • Iron deficiency anemia   • Incontinence of feces   • Functional bowel disorder   • Hyperthyroidism   • Abnormal laboratory test   • Vitamin D deficiency   • Fecal smearing     Past Medical History:   Diagnosis Date   • Arthritis    • Colon cancer ([facility] [address] )     age 45   • Colon polyp       Past Surgical History:   Procedure Laterality Date   •  SECTION      43 years ago   • [address]    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   • TOTAL ABDOMINAL HYSTERECTOMY Bilateral     age 37   • TOTAL ABDOMINAL HYSTERECTOMY W/ BILATERAL SALPINGOOPHORECTOMY Bilateral     fibroids; age 37      Family History   Problem Relation Age of Onset   • No Known Problems Mother    • No Known Problems Father    • Cancer Family    • Lung cancer Sister 80   • Colon cancer Sister 79   • Breast cancer Sister 61   • No Known Problems Daughter    • No Known Problems Maternal Aunt    • No Known Problems Maternal Grandmother    • No Known Problems Maternal Grandfather    • No Known Problems Paternal Grandmother    • No Known Problems Paternal Grandfather      Social History     Tobacco Use   • Smoking status: Former     Packs/day: 0 25     Years: 17 00     Pack years: 4 25     Types: Cigarettes     Start date: 63     Quit date:      Years since quittin 0   • Smokeless tobacco: Never   Substance Use Topics   • Alcohol use: No     No Known Allergies      Review of Systems   Constitutional: Positive for fatigue  HENT: Negative  Eyes: Negative  Respiratory: Negative  Cardiovascular: Negative  Gastrointestinal: Negative  Endocrine: Negative  Musculoskeletal: Negative  Skin: Negative  Allergic/Immunologic: Negative  Neurological: Negative  Hematological: Negative  Psychiatric/Behavioral: Negative            Current Outpatient Medications:   •  Biotin 1000 MCG tablet, Take 1,000 mcg by mouth daily, Disp: , Rfl:   •  Calcium 500-125 MG-UNIT TABS, Take 1 tablet by mouth daily, Disp: , Rfl:   •  Carboxymethylcellulose Sodium (RETAINE CMC OP), Apply to eye, Disp: , Rfl:   •  Cholecalciferol (VITAMIN D3) 1000 units CAPS, Take by mouth, Disp: , Rfl:   •  cyanocobalamin (VITAMIN B-12) 500 MCG tablet, Take 500 mcg by mouth daily, Disp: , Rfl:   •  Glucosamine-Chondroit-Vit C-Mn (GLUCOSAMINE 1500 COMPLEX) CAPS, Take by mouth, Disp: , Rfl:   •  Multiple Vitamin (MULTIVITAMIN) tablet, Take 1 tablet by mouth daily, Disp: , Rfl:   •  Omega-3 Fatty Acids (OMEGA-3 FISH OIL) 1200 MG CAPS, Take by mouth, Disp: , Rfl:   •  Probiotic Product (PROBIOTIC-10) CAPS, Take by mouth, Disp: , Rfl:   •  RESTASIS 0 05 % ophthalmic emulsion, , Disp: , Rfl:   •  zinc gluconate 50 mg tablet, Take 50 mg by mouth daily, Disp: , Rfl:   •  COD LIVER OIL PO, Take by mouth (Patient not taking: Reported on 10/10/2022), Disp: , Rfl:   •  Kelp 150 MCG TABS, Take by mouth (Patient not taking: Reported on 10/10/2022), Disp: , Rfl:     Physical Exam:  Body mass index is 24 57 kg/m²  /84 (BP Location: Left arm, Patient Position: Sitting, Cuff Size: Standard)   Pulse 97   Temp 98 6 °F (37 °C) (Tympanic)   Ht 5' (1 524 m)   Wt 57 1 kg (125 lb 12 8 oz)   SpO2 (!) 70%   BMI 24 57 kg/m²        Physical Exam  Constitutional:       Appearance: She is well-developed  HENT:      Head: Normocephalic  Eyes:      Pupils: Pupils are equal, round, and reactive to light  Neck:      Thyroid: No thyromegaly  Cardiovascular:      Rate and Rhythm: Normal rate  Heart sounds: Normal heart sounds  Pulmonary:      Effort: Pulmonary effort is normal       Breath sounds: Normal breath sounds  Abdominal:      General: Bowel sounds are normal       Palpations: Abdomen is soft  Musculoskeletal:         General: No deformity  Cervical back: Normal range of motion  Skin:     Capillary Refill: Capillary refill takes less than 2 seconds  Coloration: Skin is not pale  Findings: No rash  Neurological:      Mental Status: She is alert and oriented to person, place, and time           Labs:     Lab Results   Component Value Date    [id number] <0 010 (L) 11/03/2022       Lab Results   Component Value Date    CREATININE 0 54 (L) 09/29/2022    CREATININE 0 75 12/02/2021    CREATININE 0 70 11/01/2021    BUN 18 09/29/2022     07/24/2015    K 3 7 09/29/2022     09/29/2022    CO2 28 09/29/2022     eGFR   Date Value Ref Range Status   09/29/2022 86 ml/min/1 73sq m Final       Lab Results   Component Value Date    ALT 26 09/29/2022    AST 25 09/29/2022    GGT 78 11/03/2022    ALKPHOS 168 (H) 09/29/2022    BILITOT 0 56 07/24/2015       Lab Results   Component Value Date    CHOLESTEROL 156 09/29/2022    CHOLESTEROL 192 11/01/2021    CHOLESTEROL 199 04/02/2021     Lab Results   Component Value Date    HDL 80 09/29/2022    HDL 92 11/01/2021     04/02/2021     Lab Results   Component Value Date    TRIG 92 09/29/2022    TRIG 69 11/01/2021    TRIG 59 04/02/2021     Lab Results   Component Value Date    Rasheed Fink 08/06/2018         Impression:  1  Hyperthyroidism         Plan:    Lamond Cheadle was seen today for advice only  Diagnoses and all orders for this visit:    Hyperthyroidism  She has mild Graves' disease  TSI was positive  Free T4 was elevated to twice normal and TSH has been suppressed for approximately 2 years  She however remains asymptomatic  She does have significant intake of seaweed and kelp  As these are iodine rich, they can have because increased thyroid activity in people predisposed  She does not have thyromegaly on examination  Today we discussed options and agreed to repeat tests to include thyroid antibodies and T3 while being off of seaweed and kelp for about 3 weeks  She will probably benefit from a small dose of methimazole to titrate TSH to 3 µIU/mL and 3-4 in her normal range  Follow-up in 6 months  -     Ambulatory Referral to Endocrinology  -     T4, free; Future  -     T3; Future  -     TSH, 3rd generation; Future  -     Thyroid Antibodies Panel; Future  -     Thyroid stimulating immunoglobulin; Future    Osteopenia, unspecified location  Scheduled for repeat DEXA bone scan  Last DEXA 8/23/2018 showed a lowest T score of -2 3 left femoral neck  10-year FRAX score was 3% hip and 10% major osteoporotic fracture  Treating a hyperthyroid state will improve her bone density  Advised dietary calcium 1000 to 1200 mg/day  May use equal and dose supplemental calcium as well  Advised to continue weightbearing exercises  Vitamin D deficiency  Advised vitamin D3 2000 units daily over-the-counter  I have spent 45 minutes with patient today in which greater than 50% of this time was spent in counseling/coordination of care  All questioned fully answered   She will call me if any problems arise  Educated/ Counseled patient on diagnostic test results, prognosis, risk vs benefit of treatment options, importance of treatment compliance, healthy life and lifestyle choices        [address]

## 2023-01-06 ENCOUNTER — APPOINTMENT (OUTPATIENT)
Dept: LAB | Facility: CLINIC | Age: 83
End: 2023-01-06

## 2023-01-06 DIAGNOSIS — E05.90 HYPERTHYROIDISM: ICD-10-CM

## 2023-01-06 LAB
T3 SERPL-MCNC: 1.6 NG/ML (ref 0.6–1.8)
T4 FREE SERPL-MCNC: 1.38 NG/DL (ref 0.76–1.46)
TSH SERPL DL<=0.05 MIU/L-ACNC: <0.01 UIU/ML (ref 0.45–4.5)

## 2023-01-07 LAB
THYROGLOB AB SERPL-ACNC: <1 IU/ML (ref 0–0.9)
THYROPEROXIDASE AB SERPL-ACNC: 27 IU/ML (ref 0–34)

## 2023-01-11 DIAGNOSIS — E05.90 HYPERTHYROIDISM: Primary | ICD-10-CM

## 2023-01-11 LAB — TSI SER-ACNC: 0.91 IU/L (ref 0–0.55)

## 2023-01-11 RX ORDER — METHIMAZOLE 5 MG/1
2.5 TABLET ORAL EVERY OTHER DAY
Qty: 30 TABLET | Refills: 0 | Status: SHIPPED | OUTPATIENT
Start: 2023-01-11

## 2023-02-09 ENCOUNTER — OFFICE VISIT (OUTPATIENT)
Dept: PHYSICAL THERAPY | Facility: REHABILITATION | Age: 83
End: 2023-02-09

## 2023-02-09 DIAGNOSIS — N39.41 URGE INCONTINENCE: ICD-10-CM

## 2023-02-09 DIAGNOSIS — R35.0 URINARY FREQUENCY: Primary | ICD-10-CM

## 2023-02-09 DIAGNOSIS — R39.15 URINARY URGENCY: ICD-10-CM

## 2023-02-09 NOTE — PROGRESS NOTES
PT Evaluation     Today's date: 2023  Patient name: Jelani Mancini  : 1940  MRN: [id number]  Referring provider: Rosa Ruvalcaba DO  Dx:   Encounter Diagnosis     ICD-10-CM    1  Urinary frequency  R35 0       2  Urinary urgency  R39 15       3  Urge incontinence  N39 41           Start Time: 1215  Stop Time: 1315  Total time in clinic (min): 60 minutes    Assessment  Assessment details: The patient is a 78 y o  female with complaints of urinary urgency and frequency which has not improved much over the past year  She did have pelvic floor PT in early   She presents to PT direct access today for an evaluation almost one year prior to her last PT session  She does have a history of colon cancer with resection of her colon  She also had a hysterectomy prior to this  Education and counseling provided today  She does demonstrate good pelvic floor muscle isolation and awareness and overall good strength  She was given bladder logs to fill out and bring in to her next visit for further assessment as well  Did discuss gradually cutting back on coffee and gradually increasing water intake  She would benefit from pelvic floor physical therapy to help reduce/manage pain and symptoms, address impairments and maximize function and quality of life upon discharge  She will be given updated HEP throughout episode of care  Therapeutic activities performed upon examination included education regarding pelvic floor anatomy, explanation of exam technique, explanation of exam findings and discussion of treatment plan as well as expectations of the patient to emphasize the importance of compliance and adherence to physical therapy visits  Impairments: abnormal muscle tone, impaired physical strength and lacks appropriate home exercise program  Understanding of Dx/Px/POC: good   Prognosis: good    Goals  ST  The patient will improve pelvic floor muscle strength 1 grade in 8 to 12 weeks     2  Subjective:       Patient ID: Martha Mcfarlane is a 61 y.o. female.    Chief Complaint: Annual Exam      HPI: Patient comes in for annual exam.  She has a history of osteoporosis and intermittent elevated parathyroid level.  She has seen Endocrine and Fosamax was stopped because it was raising her blood pressure.  Blood pressure has been stable since.  She is due for some updated labs.  She denies any chest pain shortness of breath fevers or chills.  She has had some mild arthralgias but nothing significant.      Review of Systems   Constitutional: Negative for activity change, appetite change, chills, fever and unexpected weight change.   HENT: Positive for rhinorrhea. Negative for hearing loss, nosebleeds, sinus pain, sore throat and trouble swallowing.    Eyes: Negative for discharge and visual disturbance.   Respiratory: Negative for cough, chest tightness, shortness of breath and wheezing.    Cardiovascular: Positive for palpitations. Negative for chest pain and leg swelling.   Gastrointestinal: Positive for constipation. Negative for abdominal pain, blood in stool, diarrhea and vomiting.   Endocrine: Negative for polydipsia and polyuria.   Genitourinary: Negative for difficulty urinating, hematuria and menstrual problem.   Musculoskeletal: Negative for arthralgias, joint swelling, neck pain and neck stiffness.   Skin: Negative for pallor and rash.   Neurological: Negative for weakness and headaches.   Psychiatric/Behavioral: Negative for confusion, dysphoric mood and suicidal ideas. The patient is not nervous/anxious.        Objective:      Physical Exam   Constitutional: She is oriented to person, place, and time. She appears well-developed and well-nourished.   Tall thin female no acute distress   HENT:   Head: Normocephalic and atraumatic.   Right Ear: Tympanic membrane, external ear and ear canal normal.   Left Ear: Tympanic membrane, external ear and ear canal normal.   Nose: Nose normal.   Mouth/Throat:  Oropharynx is clear and moist and mucous membranes are normal. No oropharyngeal exudate.   Eyes: Conjunctivae and EOM are normal. Pupils are equal, round, and reactive to light. Right eye exhibits no discharge. Left eye exhibits no discharge.   Neck: Normal range of motion. Neck supple. No JVD present. No thyromegaly present.   Cardiovascular: Normal rate, regular rhythm, normal heart sounds and intact distal pulses.    No murmur heard.  Pulmonary/Chest: Effort normal and breath sounds normal. No respiratory distress. She has no wheezes. She has no rales.   Abdominal: Soft. Bowel sounds are normal. She exhibits no mass. There is no tenderness.   Musculoskeletal: Normal range of motion. She exhibits no edema or tenderness.   Lymphadenopathy:     She has no cervical adenopathy.        Right: No supraclavicular adenopathy present.        Left: No supraclavicular adenopathy present.   Neurological: She is alert and oriented to person, place, and time. She has normal reflexes. No cranial nerve deficit.   Skin: No rash noted.   Psychiatric: She has a normal mood and affect. She does not exhibit a depressed mood.       Assessment:       1. Routine physical examination    2. Hyperparathyroidism    3. Osteoporosis without current pathological fracture, unspecified osteoporosis type    4. Routine medical exam    5. Hypercalciuria    6. Chronic constipation        Plan:       Martha LARA was seen today for annual exam.    Diagnoses and all orders for this visit:    Routine physical examination    Hyperparathyroidism    Osteoporosis without current pathological fracture, unspecified osteoporosis type    Routine medical exam  -     Lipid panel; Future  -     Comprehensive metabolic panel; Future  -     CBC auto differential; Future    Hypercalciuria    Chronic constipation         Review labs, follow-up in 6-12 months, sooner p.r.n.   The patient will achieve pelvic floor muscle endurance to 3 to 5 seconds in 8 to 12 weeks  3  The patient will improve water intake and reduce/eliminate bladder irritatants from her diet in 8 to 12 weeks  LT  The patient will be independent with HEP upon discharge  2  The patient will control urinary urgency and minimize urinary frequency upon discharge  3  The patient will Improve urinary frequency to a minimum of once every 2 hours upon discharge            Plan  Patient would benefit from: skilled PT  Planned modality interventions: biofeedback and ultrasound (Real Time Ultrasound)  Planned therapy interventions: manual therapy, neuromuscular re-education, patient education, strengthening, therapeutic exercise, therapeutic training, home exercise program, abdominal trunk stabilization, self care, postural training, therapeutic activities and stretching  Frequency: 1x week  Duration in visits: 4  Duration in weeks: 4  Plan of Care beginning date: 2023  Plan of Care expiration date: 3/9/2023  Treatment plan discussed with: patient        PT Pelvic Floor Subjective:   History of Present Illness:    She does have a history of colon cancer (about 30 years ago)  She had resection of her colon at the time  She is seeing a colorectal specialist this week in regards to some of her symptoms and she also is having problems with hemorrhoids  She has been experiencing frequency and urgency of her bladder  She would like to try to learn how to control her bladder  She did have an episode of fecal incontinence last week which has never happened since her colon cancer surgery  The patient notes that she had her R knee replaced in 2022 and she is doing great in regards to this  She notes that she continues to experience fecal urgency and bladder urgency  She does feel that this is problematic when she travels  She tends to not eat or drink anything when she travels and this helps   She notes that since she stopped pelvic floor PT last year, her symptoms were not improved  She reports that her symptoms are not as distressing to her now  She has learned how to manage them  She notes that she will wait to try to have a bowel movement before she goes somewhere and sometimes she empties more than once  She does follow up with Dr Riley Comment again this month  She was also previously seeing Dr Harjinder Nix as well for her bladder  Diet and Exercise:      Was going to the gym prior to COVID-19, not currently formally exercising  Co-morbidities:    -R knee injury years ago- will need a TKR in near future  -R sciatica  OB/ gyn History    Gestational History:     Number of term pregnancies: 1    Delivery Type:  section      Delivery Complications:  Patient notes that she did have a few miscarriages  Hysterectomy in early 40's due to fibroid  Patient was taking Estrogen supplements for 1-2 years then stopped  No major post menopausal symptoms  Bladder Function:     Voiding Difficulties positive for: urgency, frequent urination and incomplete emptying      Voiding Difficulties negative for: straining      Voiding Difficulties comments:     Urinary frequency: about every 1-2 hours  Urinary leakage: urine leakage    Urinary leakage aggravated by: walking to the bathroom (not frequently; but if she holds it for too long)    Urinary leakage not aggravated by: coughing and sneezing    Nocturia (episodes per night): 2 (averages < 8 hours of sleep; falls asleep at 8 pm at night wakes up at 4 am)    Fluid Intake Type: Water, coffee and tea    Intake (ounces):      Intake (ounces) comment: Coffee: 4-5 cups of decaf coffee + 1/2 tsp of regular coffee  Tea: 1 cup; rodolfo tea (decaf) at night  Water: 8-16 ounces of less spread throughout the day  Incontinence Management:     Pads/Diaper Use:  24 hours    Pads/Diapers Additional Comments: does wear a liner every day; changes a few times a day; sometimes wet a few times  Bowel Function:     Voiding DIfficulties: urgency      Bowel Function comments:  Usually BM's in the mornings  Occasional urgency of stools  Fecal incontinence on occasion; loses a little bit of stools prior to getting into the bathroom  Stools are soft but formed  Takes FiberOne daily    Bowel frequency: daily and multiple times a day  Pain:     No pain reported by patient  Objective   Pelvic Floor Exam   Abdominal assessment: Soft and non tender    3 scars - ; vertical midline and left suprapubic region; all well healed; no hypersensitivity or pain/tenderness; mild restriction in mobility of all 3    General Perineum Exam:   perineum intact (vaginal atrophy and dryness noted)  Positive for perianal erythema (some irritation on labia minora noted )     Negative for swelling, descent, lesion, gaping introitus and no pelvic organ prolapse at rest    General perineum exam comments: Pelvic floor verbal consent and written consent signed and in chart     Education provided today:   Time Spent on Patient Education: 15 min  PT exam and course of treatment  Bladder and Bowel log      Visual Inspection of Perineum:   Excursion of perineal body in cephalad direction with contraction of pelvic floor muscles (PFM): good  Excursion of perineal body in caudal direction with relaxation of pelvic floor muscles (PFM): weak  Involuntary contraction with coughing: no  Involuntary relaxation with bearing down: no  Cotton swab test: non-tender  Sensation: intact    Pelvic Organ Prolapse   no pelvic organ prolapseno pelvic organ prolapse at rest    Pelvic Floor Muscle Exam     Palpation   No increased muscle tension in the pubococcygeus, iIliococcygeus, obturator internus and periurethral  Minimal increased muscle tension in the bulbospongiosus, ischiocavernosus, super transverse perineal and puborectalis  No tenderness on right in the bulbospongiosus, ischiocavernosus, super transverse perineal, puborectalis, pubococcygeus, iliococcygeus, obturator internus and periurethral  No tenderness on left in the bulbospongiosus, ischiocavernosus, super transverse perineal, puborectalis, pubococcygeus, iliococcygeus, obturator internus and periurethral    Muscle Contraction: well isolated  Breathing pattern with contraction: within normal limits  Pelvic floor muscle relaxation is delayed and complete       PERFECT Score   Power right: 3/5  Power left: 3/5  Endurance (seconds to max): 3               Precautions:   [employer] HEP    Manuals 2/9                                                                Neuro Re-Ed             Diaphragmatic Breathing             TA ADIM             TA + PFMC                          Biofeedback sEMG             PFMC: Slow Holds             PFMC: Quick Flicks             PFMC + hip abd isometrics             PFMC + hip add isometrics             PFMC + ecc bridge             PFMC in sitting             PFMC seated + tilt             PFMC in standing             PFMC in Quadruped                                                                 Ther Ex             PPT             Bridge + SLR             Bridge + marching                                                                              Ther Activity             EDUCATION 15 min            PFMC + reach             PFMC + squat             PFMC + lift             PFMC + sit to stand             PFMC + step up/down             Gait Training                                       Modalities

## 2023-02-10 ENCOUNTER — TELEPHONE (OUTPATIENT)
Dept: INTERNAL MEDICINE CLINIC | Facility: CLINIC | Age: 83
End: 2023-02-10

## 2023-02-10 DIAGNOSIS — G57.00 PIRIFORMIS SYNDROME, UNSPECIFIED LATERALITY: Primary | ICD-10-CM

## 2023-02-10 NOTE — TELEPHONE ENCOUNTER
Patient called in stating that she needs Dr Marylou Cain to send a new referral over to [address]

## 2023-02-16 NOTE — PROGRESS NOTES
Colon and Rectal Surgery   Megan Bruno Setting 78 y o  female MRN [id number]  Encounter: [id number]  23 1:19 PM            Assessment: Jina Castillo is a 78 y o  female who had fecal smearing  Plan:   Functional bowel disorder  Mild fecal smearing/incontinence symptoms have resolved on a high-fiber diet  We discussed the fact that she has some rectal intussusception but this should be treated only if she fails to control symptoms with medical management  At this time, we will observe her  She knows she can return at any time  Subjective     HPI    Jina Castillo is a 78 y o  female who is here today for 3 month follow up evaluation  She is feeling well today  Patient states that she is currently not having fecal smearing or fecal incontinence  Patient was last seen in the office on 2022 for evaluation of fecal smearing likely due to to rectal intussusception  Personal history of colonic polyps, Family history of colonic polyps and family history of colon cancer in her sister      Her most recent colonoscopy was 2022, with Dr Jaswant Spivey, which showed to be within normal limits   No further screening colonoscopies due to age      Historical Information   Past Medical History:   Diagnosis Date   • Arthritis    • Colon cancer [facility])     age 45   • Colon polyp      Past Surgical History:   Procedure Laterality Date   •  SECTION      43 years ago   • COLON SURGERY      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   • JOINT REPLACEMENT  2022    R TKA   • TOTAL ABDOMINAL HYSTERECTOMY Bilateral     age 37   • TOTAL ABDOMINAL HYSTERECTOMY W/ BILATERAL SALPINGOOPHORECTOMY Bilateral     fibroids; age 37       Meds/Allergies       Current Outpatient Medications:   •  Biotin 1000 MCG tablet, Take 1,000 mcg by mouth daily, Disp: , Rfl:   •  Calcium 500-125 MG-UNIT TABS, Take 1 tablet by mouth daily, Disp: , Rfl:   •  Carboxymethylcellulose Sodium (RETAINE CMC OP), Apply to eye, Disp: , Rfl:   •  Cholecalciferol (VITAMIN D3) 1000 units CAPS, Take by mouth, Disp: , Rfl:   •  COD LIVER OIL PO, Take by mouth (Patient not taking: Reported on 10/10/2022), Disp: , Rfl:   •  cyanocobalamin (VITAMIN B-12) 500 MCG tablet, Take 500 mcg by mouth daily, Disp: , Rfl:   •  Glucosamine-Chondroit-Vit C-Mn (GLUCOSAMINE 1500 COMPLEX) CAPS, Take by mouth (Patient not taking: Reported on 2023), Disp: , Rfl:   •  Kelp 150 MCG TABS, Take by mouth (Patient not taking: Reported on 10/10/2022), Disp: , Rfl:   •  methimazole (TAPAZOLE) 5 mg tablet, Take 0 5 tablets (2 5 mg total) by mouth every other day, Disp: 30 tablet, Rfl: 0  •  Multiple Vitamin (MULTIVITAMIN) tablet, Take 1 tablet by mouth daily, Disp: , Rfl:   •  Omega-3 Fatty Acids (OMEGA-3 FISH OIL) 1200 MG CAPS, Take by mouth (Patient not taking: Reported on 2023), Disp: , Rfl:   •  Probiotic Product (PROBIOTIC-10) CAPS, Take by mouth, Disp: , Rfl:   •  RESTASIS 0 05 % ophthalmic emulsion, , Disp: , Rfl:   •  zinc gluconate 50 mg tablet, Take 50 mg by mouth daily, Disp: , Rfl:   No Known Allergies    Social History   Social History     Substance and Sexual Activity   Drug Use No     Social History     Tobacco Use   Smoking Status Former   • Packs/day: 0 25   • Years: 17 00   • Pack years: 4 25   • Types: Cigarettes   • Start date:    • Quit date:    • Years since quittin 1   Smokeless Tobacco Never         Family History   Problem Relation Age of Onset   • No Known Problems Mother    • No Known Problems Father    • Cancer Family    • Lung cancer Sister 80   • Colon cancer Sister 79   • Breast cancer Sister 61   • No Known Problems Daughter    • No Known Problems Maternal Aunt    • No Known Problems Maternal Grandmother    • No Known Problems Maternal Grandfather    • No Known Problems Paternal Grandmother    • No Known Problems Paternal Grandfather          Review of Systems    Objective   Current Vitals:  Vitals:    23 1308 Weight: 57 6 kg (127 lb)   Height: 5' (1 524 m)         Physical Exam  Constitutional:       Appearance: Normal appearance  Neurological:      General: No focal deficit present  Mental Status: She is alert and oriented to person, place, and time

## 2023-02-20 ENCOUNTER — OFFICE VISIT (OUTPATIENT)
Age: 83
End: 2023-02-20

## 2023-02-20 VITALS — BODY MASS INDEX: 24.94 KG/M2 | WEIGHT: 127 LBS | HEIGHT: 60 IN

## 2023-02-20 DIAGNOSIS — K59.9 FUNCTIONAL BOWEL DISORDER: Primary | ICD-10-CM

## 2023-02-20 NOTE — ASSESSMENT & PLAN NOTE
Mild fecal smearing/incontinence symptoms have resolved on a high-fiber diet  We discussed the fact that she has some rectal intussusception but this should be treated only if she fails to control symptoms with medical management  At this time, we will observe her  She knows she can return at any time

## 2023-02-28 ENCOUNTER — OFFICE VISIT (OUTPATIENT)
Dept: PHYSICAL THERAPY | Facility: REHABILITATION | Age: 83
End: 2023-02-28

## 2023-02-28 DIAGNOSIS — R39.15 URINARY URGENCY: ICD-10-CM

## 2023-02-28 DIAGNOSIS — R35.0 URINARY FREQUENCY: Primary | ICD-10-CM

## 2023-02-28 DIAGNOSIS — N39.41 URGE INCONTINENCE: ICD-10-CM

## 2023-02-28 NOTE — PROGRESS NOTES
Daily Note     Today's date: 2023  Patient name: Tracy Saldaña  : 1940  MRN: [id number]  Referring provider: Merle Lundberg DO  Dx:   Encounter Diagnosis     ICD-10-CM    1  Urinary frequency  R35 0       2  Urinary urgency  R39 15       3  Urge incontinence  N39 41           Start Time: 364  Stop Time: 1000  Total time in clinic (min): 55 minutes    Subjective: The patient notes that she continues to experience frequency of urination but no leakage  She did have a follow up with Dr Andrea Samson in regards to her fecal symptoms and this went well  Objective: See treatment diary below      Assessment: Tolerated treatment well  Patient bladder logs reviewed  Did discuss drinking 16 oz of water a day as she is now only currently drinking 8 oz of water (and 8 oz of Vitamin Water) (with the goal of gradually increasing water intake over time)  Also recommended cutting back on one cup of coffee as she currently has 4-5 cups a day  She is going to monitor the color of her urine in regards to hydration as it is typically darker or more yellow  She is also going to wait 10-15 min, when at home, prior to going to the bathroom to stretch out her voiding intervals  She currently empties about every 2 hours, sometimes less  NV - perform Biofeedback for some muscle retraining  She will return in 2 weeks for her next visit  Plan: Continue per plan of care        Precautions:   Danielle HEP    Manuals                                                                Neuro Re-Ed             Diaphragmatic Breathing             TA ADIM             TA + PFMC                          Biofeedback sEMG             PFMC: Slow Holds             PFMC: Quick Flicks             PFMC + hip abd isometrics             PFMC + hip add isometrics             PFMC + ecc bridge             PFMC in sitting             PFMC seated + tilt             PFMC in standing             PFMC in Quadruped Ther Ex             PPT             Bridge + SLR             Bridge + marching                                                                              Ther Activity             EDUCATION 15 min 55 min    including bladder log review and counseling           PFMC + reach             PFMC + squat             PFMC + lift             PFMC + sit to stand             PFMC + step up/down             Gait Training                                       Modalities

## 2023-03-06 ENCOUNTER — APPOINTMENT (OUTPATIENT)
Dept: PHYSICAL THERAPY | Facility: REHABILITATION | Age: 83
End: 2023-03-06

## 2023-03-08 ENCOUNTER — HOSPITAL ENCOUNTER (OUTPATIENT)
Dept: RADIOLOGY | Age: 83
Discharge: HOME | End: 2023-03-08

## 2023-03-08 VITALS — WEIGHT: 128 LBS | BODY MASS INDEX: 26.87 KG/M2 | HEIGHT: 58 IN

## 2023-03-08 DIAGNOSIS — M85.80 OSTEOPENIA, UNSPECIFIED LOCATION: ICD-10-CM

## 2023-03-09 ENCOUNTER — OFFICE VISIT (OUTPATIENT)
Dept: OBGYN CLINIC | Facility: CLINIC | Age: 83
End: 2023-03-09

## 2023-03-09 VITALS
DIASTOLIC BLOOD PRESSURE: 82 MMHG | BODY MASS INDEX: 26.66 KG/M2 | WEIGHT: 127 LBS | HEIGHT: 58 IN | SYSTOLIC BLOOD PRESSURE: 118 MMHG

## 2023-03-09 DIAGNOSIS — Z90.710 HISTORY OF ABDOMINAL HYSTERECTOMY: ICD-10-CM

## 2023-03-09 DIAGNOSIS — N76.3 SUBACUTE VULVITIS: Primary | ICD-10-CM

## 2023-03-09 RX ORDER — CLOTRIMAZOLE AND BETAMETHASONE DIPROPIONATE 10; .64 MG/G; MG/G
CREAM TOPICAL 2 TIMES DAILY
Qty: 30 G | Refills: 0 | Status: SHIPPED | OUTPATIENT
Start: 2023-03-09

## 2023-03-09 NOTE — PROGRESS NOTES
Assessment/Plan: At this point she will discontinue her over-the-counter products  Recommend Lotrisone cream nightly x7 days  She will try to avoid pads over the next 1 week  Discussed using daily zinc oxide  Reviewed hygiene  All questions answered  She will call with update in 2 weeks  Discussed if no improvement would then recommend vulvar biopsy  No problem-specific Assessment & Plan notes found for this encounter  Diagnoses and all orders for this visit:    Subacute vulvitis  -     clotrimazole-betamethasone (LOTRISONE) 1-0 05 % cream; Apply topically 2 (two) times a day    History of abdominal hysterectomy          Subjective:      Patient ID: Prashant Roper is a 78 y o  female  HPI     This is a very pleasant 78-year-old female P1 ( x1) presents complaining of external vaginal irritation over the last 3 weeks  She has been using over-the-counter agents including benzocaine 5% / 20% with minimal improvement  She went through menopause at age 34, iatrogenic with BERNARDINO/BSO fibroid uterus  She was on hormone replacement therapy for 3 years thereafter  She denies any vaginal bleeding or spotting  No vaginal discharge  She does wear a pad on a daily basis for concerns of urgency and infrequent episodes of urinary incontinence  Patient is very active  She has concerns of traveling in 3 weeks for family in [place]  Patient is  x12 years  She states her soap products and laundry products have not changed  She has been using pads on a daily basis for many years  Last GYN exam approximately 4 years ago  The following portions of the patient's history were reviewed and updated as appropriate: allergies, current medications, past family history, past medical history, past social history, past surgical history and problem list     Review of Systems   Constitutional: Negative for fatigue, fever and unexpected weight change     Respiratory: Negative for cough, chest tightness, shortness of breath and wheezing  Cardiovascular: Negative  Negative for chest pain and palpitations  Gastrointestinal: Negative  Negative for abdominal distention, abdominal pain, blood in stool, constipation, diarrhea, nausea and vomiting  Genitourinary: Negative  Negative for difficulty urinating, dyspareunia, dysuria, flank pain, frequency, genital sores, hematuria, pelvic pain, urgency, vaginal bleeding, vaginal discharge and vaginal pain  Skin: Negative for rash  Objective:      /82   Ht 4' 9 5" (1 461 m)   Wt 57 6 kg (127 lb)   BMI 27 01 kg/m²          Physical Exam  Abdominal:      General: Bowel sounds are normal  There is no distension  Palpations: Abdomen is soft  Tenderness: There is no abdominal tenderness  There is no guarding  Genitourinary:     Labia:         Right: Rash present  No tenderness or lesion  Left: Rash present  No tenderness or lesion  Vagina: No signs of injury  No vaginal discharge, tenderness or lesions  Uterus: Absent  Neurological:      Mental Status: She is alert  External genitalia is evident of confluent raised erythema (leathery texture) along labia majora bilaterally with diffuse fissures, no other lesions seen  The vagina is evident of estrogen deficiency  The cervix is surgically absent  The cuff is well supported  I have spent a total time of 35 minutes on 03/09/23 in caring for this patient including Instructions for management

## 2023-03-13 ENCOUNTER — OFFICE VISIT (OUTPATIENT)
Dept: PHYSICAL THERAPY | Facility: REHABILITATION | Age: 83
End: 2023-03-13

## 2023-03-13 DIAGNOSIS — R39.15 URINARY URGENCY: ICD-10-CM

## 2023-03-13 DIAGNOSIS — N39.41 URGE INCONTINENCE: ICD-10-CM

## 2023-03-13 DIAGNOSIS — R35.0 URINARY FREQUENCY: Primary | ICD-10-CM

## 2023-03-13 NOTE — PROGRESS NOTES
Daily Note     Today's date: 3/13/2023  Patient name: Mirtha Sanders  : 1940  MRN: [id number]  Referring provider: Sean Hay DO  Dx:   Encounter Diagnosis     ICD-10-CM    1  Urinary frequency  R35 0       2  Urinary urgency  R39 15       3  Urge incontinence  N39 41           Start Time: 1100  Stop Time: 1200  Total time in clinic (min): 60 minutes    Subjective: The patient notes that she was able to defer an urge to urinate anywhere from 15 min to an hour  She does feel overall that she has good control of her bladder  She saw her OBGYN in regards to her vaginal itching and she prescribed a cream for the patient  She has not needed to wear a pad recently  She is happy with her progress so far  Objective: See treatment diary below      Assessment: Tolerated treatment well  Patient reviewed and discussed bladder suppression techniques  She also performed Biofeedback today  She did well with this overall, good resting rate noted and overall good recruitment and coordination as well  She was given HEP for home  She is going to contact PT after her trip and update on her progress  Plan: PT on hold at this time        Precautions:   [employer] Extra HEP    Manuals 2/9 2/28 3/13                                                              Neuro Re-Ed             Diaphragmatic Breathing             TA ADIM             TA + PFMC                          Biofeedback sEMG   25 min total          PFMC: Slow Holds   2x10          PFMC: Quick Flicks   3K56          PFMC + hip abd isometrics             PFMC + hip add isometrics             PFMC + ecc bridge             PFMC in sitting             PFMC seated + tilt             PFMC in standing             PFMC in Quadruped                                                                 Ther Ex             PPT             Bridge + SLR             Bridge + marching                                                                              Ther Activity EDUCATION 15 min 55 min    including bladder log review and counseling 30 min          PFMC + reach             PFMC + squat             PFMC + lift             PFMC + sit to stand             PFMC + step up/down             Gait Training                                       Modalities

## 2023-03-20 ENCOUNTER — APPOINTMENT (OUTPATIENT)
Dept: PHYSICAL THERAPY | Facility: REHABILITATION | Age: 83
End: 2023-03-20

## 2023-03-27 ENCOUNTER — APPOINTMENT (OUTPATIENT)
Dept: PHYSICAL THERAPY | Facility: REHABILITATION | Age: 83
End: 2023-03-27

## 2023-04-18 ENCOUNTER — OFFICE VISIT (OUTPATIENT)
Dept: INTERNAL MEDICINE CLINIC | Facility: CLINIC | Age: 83
End: 2023-04-18

## 2023-04-18 VITALS
HEART RATE: 67 BPM | DIASTOLIC BLOOD PRESSURE: 70 MMHG | OXYGEN SATURATION: 97 % | HEIGHT: 58 IN | WEIGHT: 128 LBS | RESPIRATION RATE: 16 BRPM | SYSTOLIC BLOOD PRESSURE: 122 MMHG | BODY MASS INDEX: 26.87 KG/M2

## 2023-04-18 DIAGNOSIS — Z00.00 MEDICARE ANNUAL WELLNESS VISIT, SUBSEQUENT: Primary | ICD-10-CM

## 2023-04-18 DIAGNOSIS — E05.90 HYPERTHYROIDISM: ICD-10-CM

## 2023-04-18 DIAGNOSIS — R73.03 PREDIABETES: ICD-10-CM

## 2023-04-18 DIAGNOSIS — Z13.6 SCREENING FOR CARDIOVASCULAR CONDITION: ICD-10-CM

## 2023-04-18 DIAGNOSIS — C26.9 GI MALIGNANCY (HCC): ICD-10-CM

## 2023-04-18 DIAGNOSIS — M54.31 SCIATICA OF RIGHT SIDE: ICD-10-CM

## 2023-04-18 NOTE — PROGRESS NOTES
Assessment and Plan:     Problem List Items Addressed This Visit        Digestive    RESOLVED: GI malignancy ([address] )       Endocrine    Hyperthyroidism     Currently on Tapazole working with endocrinology appears clinically stable we will continue to monitor         Relevant Orders    TSH, 3rd generation with Free T4 reflex       Nervous and Auditory    Sciatica of right side     We will have patient see chiropractor Dr Main Mass Referral to Chiropractic       Other    Medicare annual wellness visit, subsequent - Primary     Assessment and plan 1  Health maintenance annual wellness examination overall the patient is clinically stable and doing well, we encouraged the patient to follow a healthy and balanced diet  We recommend that the patient exercise routinely approximately 30 minutes 5 times per week   We have reviewed the patient's vaccines and have made recommendations for updates if necessary annual flu shot recommended     We will be ordering screening laboratories which are age appropriate  Return to the office in   6 months   call if any problems  Prediabetes     Pre diabetes -I have counseled the patient to follow a healthy balanced diet, I have counseled patient reduce carbohydrates and sweets in the diet, I would like the patient exercise routinely  I will be checking hemoglobin A1c and comprehensive metabolic panel  Have counseled patient about the prevention of diabetes, and the risk of progression to type 2 diabetes  Relevant Orders    Comprehensive metabolic panel    Screening for cardiovascular condition    Relevant Orders    Lipid Panel with Direct LDL reflex     BMI Counseling: Body mass index is 27 22 kg/m²  The BMI is above normal  Nutrition recommendations include decreasing portion sizes and moderation in carbohydrate intake  Exercise recommendations include exercising 3-5 times per week   Rationale for BMI follow-up plan is due to patient being overweight or obese  Depression Screening and Follow-up Plan: Patient was screened for depression during today's encounter  They screened negative with a PHQ-2 score of 0  Preventive health issues were discussed with patient, and age appropriate screening tests were ordered as noted in patient's After Visit Summary  Personalized health advice and appropriate referrals for health education or preventive services given if needed, as noted in patient's After Visit Summary  History of Present Illness:     Patient presents for a Medicare Wellness Visit    HPI 78-year old female coming in for a follow up office visit regarding hypothyroidism, prediabetes, sciatica; the patient reports me compliant taking medications without untoward side effects the  The patient is here to review his medical condition, update me on the medical condition and the patient reports me no hospitalizations and no ER visits  No injuries no illnesses and overall doing very well here to review laboratories in detail is trying to follow a healthy and balanced diet remains active she does report to me sciatica right side working with chiropractor would like to see a chiropractor at [facility]  Also work reports to me working with endocrinology regarding hyperthyroidism doing well and taking the methimazole  Patient Care Team:  Baldo Mai DO as PCP - General  Thaddeus Foy DO     Review of Systems:     Review of Systems   Constitutional: Negative for activity change, appetite change and unexpected weight change  HENT: Negative for congestion  Eyes: Negative for visual disturbance  Respiratory: Negative for cough and shortness of breath  Cardiovascular: Negative for chest pain  Gastrointestinal: Negative for abdominal pain, diarrhea, nausea and vomiting  Neurological: Negative for dizziness, light-headedness and headaches          Problem List:     Patient Active Problem List   Diagnosis   • Medicare annual wellness visit, subsequent   • Osteopenia   • Fall   • Benign positional vertigo, left   • Head injury   • Prediabetes   • Screening for cardiovascular condition   • Absence of bladder continence   • Cellulitis of pubic region   • Venous insufficiency   • Skin lesion   • Urine incontinence   • Chronic pain of right knee   • Sciatica of right side   • Lower limb pain, posterior, right   • Low blood potassium   • After cataract, right eye   • Pre-op exam   • Iron deficiency anemia   • Incontinence of feces   • Functional bowel disorder   • Hyperthyroidism   • Abnormal laboratory test   • Vitamin D deficiency   • Fecal smearing      Past Medical and Surgical History:     Past Medical History:   Diagnosis Date   • Arthritis    • Colon cancer ([facility] )     age 45   • Colon polyp    • GI malignancy ([facility] [address] ) 2022     Past Surgical History:   Procedure Laterality Date   •  SECTION      43 years ago   • [address]    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   • JOINT REPLACEMENT  2022    R TKA   • TOTAL ABDOMINAL HYSTERECTOMY Bilateral     age 37   • TOTAL ABDOMINAL HYSTERECTOMY W/ BILATERAL SALPINGOOPHORECTOMY Bilateral     fibroids; age 37      Family History:     Family History   Problem Relation Age of Onset   • No Known Problems Mother    • No Known Problems Father    • Cancer Family    • Lung cancer Sister 80   • Colon cancer Sister 79   • Breast cancer Sister 61   • No Known Problems Daughter    • No Known Problems Maternal Aunt    • No Known Problems Maternal Grandmother    • No Known Problems Maternal Grandfather    • No Known Problems Paternal Grandmother    • No Known Problems Paternal Grandfather       Social History:     Social History     Socioeconomic History   • Marital status:       Spouse name: None   • Number of children: None   • Years of education: None   • Highest education level: None   Occupational History   • None   Tobacco Use   • Smoking status: Former Packs/day: 0 25     Years: 17 00     Pack years: 4 25     Types: Cigarettes     Start date: 63     Quit date:      Years since quittin 3   • Smokeless tobacco: Never   Vaping Use   • Vaping Use: Never used   Substance and Sexual Activity   • Alcohol use: No   • Drug use: No   • Sexual activity: Not Currently     Partners: Male   Other Topics Concern   • None   Social History Narrative   • None     Social Determinants of Health     Financial Resource Strain: Low Risk    • Difficulty of Paying Living Expenses: Not hard at all   Food Insecurity: Not on file   Transportation Needs: No Transportation Needs   • Lack of Transportation (Medical): No   • Lack of Transportation (Non-Medical): No   Physical Activity: Not on file   Stress: Not on file   Social Connections: Not on file   Intimate Partner Violence: Not on file   Housing Stability: Not on file      Medications and Allergies:     Current Outpatient Medications   Medication Sig Dispense Refill   • Biotin 1000 MCG tablet Take 1,000 mcg by mouth daily     • Calcium 500-125 MG-UNIT TABS Take 1 tablet by mouth daily     • Carboxymethylcellulose Sodium (RETAINE [facility] OP) Apply to eye     • Cholecalciferol (VITAMIN D3) 1000 units CAPS Take by mouth     • cyanocobalamin (VITAMIN B-12) 500 MCG tablet Take 500 mcg by mouth daily     • methimazole (TAPAZOLE) 5 mg tablet Take 0 5 tablets (2 5 mg total) by mouth every other day 30 tablet 0   • Multiple Vitamin (MULTIVITAMIN) tablet Take 1 tablet by mouth daily     • Omega-3 Fatty Acids (OMEGA-3 FISH OIL) 1200 MG CAPS Take by mouth     • Probiotic Product (PROBIOTIC-10) CAPS Take by mouth     • RESTASIS 0 05 % ophthalmic emulsion      • zinc gluconate 50 mg tablet Take 50 mg by mouth daily       No current facility-administered medications for this visit       No Known Allergies   Immunizations:     Immunization History   Administered Date(s) Administered   • COVID-19 MODERNA VACC 0 25 ML IM BOOSTER 2022   • COVID-19 PFIZER VACCINE 0 3 ML IM 02/13/2021, 03/04/2021   • INFLUENZA 10/06/2022   • Influenza Split High Dose Preservative Free IM 10/08/2016, 09/22/2017   • Influenza, high dose seasonal 0 7 mL 09/10/2019, 08/28/2020, 10/06/2022   • Pneumococcal Conjugate 13-Valent 05/06/2019   • Pneumococcal Polysaccharide PPV23 07/23/2010   • TD (adult) Preservative Free 07/23/2013   • Zoster 04/24/2014   • Zoster Vaccine Recombinant 05/04/2021      Health Maintenance:         Topic Date Due   • DXA SCAN  03/08/2025         Topic Date Due   • COVID-19 Vaccine (4 - Booster for Pfizer series) 03/01/2022      Medicare Screening Tests and Risk Assessments:     Chicho Molina is here for her Subsequent Wellness visit  Health Risk Assessment:   Patient rates overall health as excellent  Patient feels that their physical health rating is same  Patient is very satisfied with their life  Eyesight was rated as same  Hearing was rated as same  Patient feels that their emotional and mental health rating is much better  Patients states they are never, rarely angry  Patient states they are never, rarely unusually tired/fatigued  Pain experienced in the last 7 days has been some  Patient's pain rating has been 2/10  Patient states that she has experienced weight loss or gain in last 6 months  Gained 10 lbs due to knee surgery; got spoiled by my daughter's great cooking! Depression Screening:   PHQ-2 Score: 0      Fall Risk Screening: In the past year, patient has experienced: no history of falling in past year      Urinary Incontinence Screening:   Patient has not leaked urine accidently in the last six months  Have seen physical therapist on leakage issue; have improved to normalcy  Home Safety:  Patient does not have trouble with stairs inside or outside of their home  Patient has working smoke alarms and has working carbon monoxide detector  Home safety hazards include: none       Nutrition:   Current diet is Regular, No Added Salt and Limited junk food  Intend to improve diet this Spring; reduce sugar, carbs; go back to gym  Medications:   Patient is currently taking over-the-counter supplements  OTC medications include: Only vitamins  Patient is able to manage medications  Activities of Daily Living (ADLs)/Instrumental Activities of Daily Living (IADLs):   Walk and transfer into and out of bed and chair?: Yes  Dress and groom yourself?: Yes    Bathe or shower yourself?: Yes    Feed yourself? Yes  Do your laundry/housekeeping?: Yes  Manage your money, pay your bills and track your expenses?: Yes  Make your own meals?: Yes    Do your own shopping?: Yes    Durable Medical Equipment Suppliers  [employer]    Previous Hospitalizations:   Any hospitalizations or ED visits within the last 12 months?: Yes    How many hospitalizations have you had in the last year?: 1-2    Hospitalization Comments: Only hospitalized for right knee replacement surgery in     Advance Care Planning:   Living will: Yes    Durable POA for healthcare: No    Advanced directive: No    Advanced directive counseling given: Yes    Five wishes given: Yes      Cognitive Screening:   Provider or family/friend/caregiver concerned regarding cognition?: No    PREVENTIVE SCREENINGS      Cardiovascular Screening:    General: Screening Current      Diabetes Screening:     General: Screening Current      Colorectal Cancer Screening:     General: History Colorectal Cancer      Breast Cancer Screening:     General: Screening Current      Cervical Cancer Screening:    General: Screening Not Indicated      Osteoporosis Screening:    General: Screening Current      Lung Cancer Screening:     General: Screening Not Indicated    Screening, Brief Intervention, and Referral to Treatment (SBIRT)    Screening  Typical number of drinks in a day: 0  Typical number of drinks in a week: 0  Interpretation: Low risk drinking behavior      AUDIT-C Screenin) How often did you have a drink "containing alcohol in the past year? never  2) How many drinks did you have on a typical day when you were drinking in the past year? 0  3) How often did you have 6 or more drinks on one occasion in the past year? never    AUDIT-C Score: 0  Interpretation: Score 0-2 (female): Negative screen for alcohol misuse    Single Item Drug Screening:  How often have you used an illegal drug (including marijuana) or a prescription medication for non-medical reasons in the past year? never    Single Item Drug Screen Score: 0  Interpretation: Negative screen for possible drug use disorder    No results found  Physical Exam:     /70   Pulse 67   Resp 16   Ht 4' 9 5\" (1 461 m)   Wt 58 1 kg (128 lb)   SpO2 97%   BMI 27 22 kg/m²     Physical Exam  Constitutional:       General: She is not in acute distress  Appearance: Normal appearance  She is well-developed  She is not ill-appearing, toxic-appearing or diaphoretic  HENT:      Head: Normocephalic and atraumatic  Right Ear: External ear normal       Left Ear: External ear normal       Nose: Nose normal    Eyes:      Pupils: Pupils are equal, round, and reactive to light  Cardiovascular:      Rate and Rhythm: Normal rate and regular rhythm  Heart sounds: Normal heart sounds  No murmur heard  Pulmonary:      Effort: Pulmonary effort is normal       Breath sounds: Normal breath sounds  Abdominal:      General: There is no distension  Palpations: Abdomen is soft  Tenderness: There is no abdominal tenderness  There is no guarding  Neurological:      Mental Status: She is alert            Carrington Reese DO  "

## 2023-04-18 NOTE — PATIENT INSTRUCTIONS
Medicare Preventive Visit Patient Instructions  Thank you for completing your Welcome to Medicare Visit or Medicare Annual Wellness Visit today  Your next wellness visit will be due in one year (4/18/2024)  The screening/preventive services that you may require over the next 5-10 years are detailed below  Some tests may not apply to you based off risk factors and/or age  Screening tests ordered at today's visit but not completed yet may show as past due  Also, please note that scanned in results may not display below  Preventive Screenings:  Service Recommendations Previous Testing/Comments   Colorectal Cancer Screening  * Colonoscopy    * Fecal Occult Blood Test (FOBT)/Fecal Immunochemical Test (FIT)  * Fecal DNA/Cologuard Test  * Flexible Sigmoidoscopy Age: 39-70 years old   Colonoscopy: every 10 years (may be performed more frequently if at higher risk)  OR  FOBT/FIT: every 1 year  OR  Cologuard: every 3 years  OR  Sigmoidoscopy: every 5 years  Screening may be recommended earlier than age 39 if at higher risk for colorectal cancer  Also, an individualized decision between you and your healthcare provider will decide whether screening between the ages of 74-80 would be appropriate  Colonoscopy: 05/13/2022  FOBT/FIT: Not on file  Cologuard: Not on file  Sigmoidoscopy: Not on file          Breast Cancer Screening Age: 36 years old  Frequency: every 1-2 years  Not required if history of left and right mastectomy Mammogram: 11/15/2022        Cervical Cancer Screening Between the ages of 21-29, pap smear recommended once every 3 years  Between the ages of 33-67, can perform pap smear with HPV co-testing every 5 years     Recommendations may differ for women with a history of total hysterectomy, cervical cancer, or abnormal pap smears in past  Pap Smear: 04/16/2019        Hepatitis C Screening Once for adults born between 1945 and 1965  More frequently in patients at high risk for Hepatitis C Hep C Antibody: Not on file        Diabetes Screening 1-2 times per year if you're at risk for diabetes or have pre-diabetes Fasting glucose: 94 mg/dL (9/29/2022)  A1C: 5 8 % (9/29/2022)      Cholesterol Screening Once every 5 years if you don't have a lipid disorder  May order more often based on risk factors  Lipid panel: 09/29/2022          Other Preventive Screenings Covered by Medicare:  Abdominal Aortic Aneurysm (AAA) Screening: covered once if your at risk  You're considered to be at risk if you have a family history of AAA  Lung Cancer Screening: covers low dose CT scan once per year if you meet all of the following conditions: (1) Age 50-69; (2) No signs or symptoms of lung cancer; (3) Current smoker or have quit smoking within the last 15 years; (4) You have a tobacco smoking history of at least 20 pack years (packs per day multiplied by number of years you smoked); (5) You get a written order from a healthcare provider  Glaucoma Screening: covered annually if you're considered high risk: (1) You have diabetes OR (2) Family history of glaucoma OR (3)  aged 48 and older OR (3)  American aged 72 and older  Osteoporosis Screening: covered every 2 years if you meet one of the following conditions: (1) You're estrogen deficient and at risk for osteoporosis based off medical history and other findings; (2) Have a vertebral abnormality; (3) On glucocorticoid therapy for more than 3 months; (4) Have primary hyperparathyroidism; (5) On osteoporosis medications and need to assess response to drug therapy  Last bone density test (DXA Scan): 03/08/2023  HIV Screening: covered annually if you're between the age of 12-76  Also covered annually if you are younger than 13 and older than 72 with risk factors for HIV infection  For pregnant patients, it is covered up to 3 times per pregnancy      Immunizations:  Immunization Recommendations   Influenza Vaccine Annual influenza vaccination during flu season is recommended for all persons aged >= 6 months who do not have contraindications   Pneumococcal Vaccine   * Pneumococcal conjugate vaccine = PCV13 (Prevnar 13), PCV15 (Vaxneuvance), PCV20 (Prevnar 20)  * Pneumococcal polysaccharide vaccine = PPSV23 (Pneumovax) Adults 2364 years old: 1-3 doses may be recommended based on certain risk factors  Adults 72 years old: 1-2 doses may be recommended based off what pneumonia vaccine you previously received   Hepatitis B Vaccine 3 dose series if at intermediate or high risk (ex: diabetes, end stage renal disease, liver disease)   Tetanus (Td) Vaccine - COST NOT COVERED BY MEDICARE PART B Following completion of primary series, a booster dose should be given every 10 years to maintain immunity against tetanus  Td may also be given as tetanus wound prophylaxis  Tdap Vaccine - COST NOT COVERED BY MEDICARE PART B Recommended at least once for all adults  For pregnant patients, recommended with each pregnancy  Shingles Vaccine (Shingrix) - COST NOT COVERED BY MEDICARE PART B  2 shot series recommended in those aged 48 and above     Health Maintenance Due:      Topic Date Due    DXA SCAN  03/08/2025     Immunizations Due:      Topic Date Due    COVID-19 Vaccine (4 - Booster for Ward Peter series) 03/01/2022     Advance Directives   What are advance directives? Advance directives are legal documents that state your wishes and plans for medical care  These plans are made ahead of time in case you lose your ability to make decisions for yourself  Advance directives can apply to any medical decision, such as the treatments you want, and if you want to donate organs  What are the types of advance directives? There are many types of advance directives, and each state has rules about how to use them  You may choose a combination of any of the following:  Living will: This is a written record of the treatment you want   You can also choose which treatments you do not want, which to limit, and which to stop at a certain time  This includes surgery, medicine, IV fluid, and tube feedings  Durable power of  for healthcare [facility]): This is a written record that states who you want to make healthcare choices for you when you are unable to make them for yourself  This person, called a proxy, is usually a family member or a friend  You may choose more than 1 proxy  Do not resuscitate (DNR) order:  A DNR order is used in case your heart stops beating or you stop breathing  It is a request not to have certain forms of treatment, such as CPR  A DNR order may be included in other types of advance directives  Medical directive: This covers the care that you want if you are in a coma, near death, or unable to make decisions for yourself  You can list the treatments you want for each condition  Treatment may include pain medicine, surgery, blood transfusions, dialysis, IV or tube feedings, and a ventilator (breathing machine)  Values history: This document has questions about your views, beliefs, and how you feel and think about life  This information can help others choose the care that you would choose  Why are advance directives important? An advance directive helps you control your care  Although spoken wishes may be used, it is better to have your wishes written down  Spoken wishes can be misunderstood, or not followed  Treatments may be given even if you do not want them  An advance directive may make it easier for your family to make difficult choices about your care  Weight Management   Why it is important to manage your weight:  Being overweight increases your risk of health conditions such as heart disease, high blood pressure, type 2 diabetes, and certain types of cancer  It can also increase your risk for osteoarthritis, sleep apnea, and other respiratory problems  Aim for a slow, steady weight loss   Even a small amount of weight loss can lower your risk of health problems  How to lose weight safely:  A safe and healthy way to lose weight is to eat fewer calories and get regular exercise  You can lose up about 1 pound a week by decreasing the number of calories you eat by 500 calories each day  Healthy meal plan for weight management:  A healthy meal plan includes a variety of foods, contains fewer calories, and helps you stay healthy  A healthy meal plan includes the following:  Eat whole-grain foods more often  A healthy meal plan should contain fiber  Fiber is the part of grains, fruits, and vegetables that is not broken down by your body  Whole-grain foods are healthy and provide extra fiber in your diet  Some examples of whole-grain foods are whole-wheat breads and pastas, oatmeal, brown rice, and bulgur  Eat a variety of vegetables every day  Include dark, leafy greens such as spinach, kale, cassandra greens, and mustard greens  Eat yellow and orange vegetables such as carrots, sweet potatoes, and winter squash  Eat a variety of fruits every day  Choose fresh or canned fruit (canned in its own juice or light syrup) instead of juice  Fruit juice has very little or no fiber  Eat low-fat dairy foods  Drink fat-free (skim) milk or 1% milk  Eat fat-free yogurt and low-fat cottage cheese  Try low-fat cheeses such as mozzarella and other reduced-fat cheeses  Choose meat and other protein foods that are low in fat  Choose beans or other legumes such as split peas or lentils  Choose fish, skinless poultry (chicken or turkey), or lean cuts of red meat (beef or pork)  Before you cook meat or poultry, cut off any visible fat  Use less fat and oil  Try baking foods instead of frying them  Add less fat, such as margarine, sour cream, regular salad dressing and mayonnaise to foods  Eat fewer high-fat foods  Some examples of high-fat foods include french fries, doughnuts, ice cream, and cakes  Eat fewer sweets  Limit foods and drinks that are high in sugar   This includes candy, cookies, regular soda, and sweetened drinks  Exercise:  Exercise at least 30 minutes per day on most days of the week  Some examples of exercise include walking, biking, dancing, and swimming  You can also fit in more physical activity by taking the stairs instead of the elevator or parking farther away from stores  Ask your healthcare provider about the best exercise plan for you  © Copyright [employer] 2018 Information is for End User's use only and may not be sold, redistributed or otherwise used for commercial purposes   All illustrations and images included in CareNotes® are the copyrighted property of [employer]  or [email or web address]

## 2023-04-23 PROBLEM — C26.9 GI MALIGNANCY (HCC): Status: RESOLVED | Noted: 2022-05-11 | Resolved: 2023-04-23

## 2023-04-23 NOTE — ASSESSMENT & PLAN NOTE
Currently on Tapazole working with endocrinology appears clinically stable we will continue to monitor

## 2023-04-23 NOTE — ASSESSMENT & PLAN NOTE
Assessment and plan 1  Health maintenance annual wellness examination overall the patient is clinically stable and doing well, we encouraged the patient to follow a healthy and balanced diet  We recommend that the patient exercise routinely approximately 30 minutes 5 times per week   We have reviewed the patient's vaccines and have made recommendations for updates if necessary annual flu shot recommended     We will be ordering screening laboratories which are age appropriate  Return to the office in   6 months   call if any problems

## 2023-05-16 ENCOUNTER — TELEPHONE (OUTPATIENT)
Dept: ENDOCRINOLOGY | Facility: CLINIC | Age: 83
End: 2023-05-16

## 2023-05-16 NOTE — TELEPHONE ENCOUNTER
Pt called needing a refill on methimazole (TAPAZOLE) 5 mg tablet  But she wants to know if Dr Belinda Regan wants her to continue taking it  She said she only takes half a pill every other day, and she comes back for an appt next month

## 2023-05-16 NOTE — TELEPHONE ENCOUNTER
Note to chart: Pt LM saying she was calling regarding a medication Dr Chris Guerrero had prescribed for her but didn't say what it was  Tried to call pt back but she didn't answer  LM for her to call back

## 2023-05-18 DIAGNOSIS — E05.90 HYPERTHYROIDISM: Primary | ICD-10-CM

## 2023-05-19 NOTE — TELEPHONE ENCOUNTER
Called and left message for patient with providers instructions  Can you please try reaching out again before you leave

## 2023-06-20 ENCOUNTER — APPOINTMENT (OUTPATIENT)
Dept: LAB | Facility: CLINIC | Age: 83
End: 2023-06-20
Payer: COMMERCIAL

## 2023-06-20 DIAGNOSIS — E05.90 HYPERTHYROIDISM: ICD-10-CM

## 2023-06-20 LAB
T3 SERPL-MCNC: 1 NG/ML
T4 FREE SERPL-MCNC: 1.12 NG/DL (ref 0.61–1.12)
T4 SERPL-MCNC: 9.21 UG/DL (ref 6.09–12.23)
TSH SERPL DL<=0.05 MIU/L-ACNC: <0.01 UIU/ML (ref 0.45–4.5)

## 2023-06-20 PROCEDURE — 84436 ASSAY OF TOTAL THYROXINE: CPT

## 2023-06-20 PROCEDURE — 36415 COLL VENOUS BLD VENIPUNCTURE: CPT

## 2023-06-20 PROCEDURE — 84480 ASSAY TRIIODOTHYRONINE (T3): CPT

## 2023-06-20 PROCEDURE — 84439 ASSAY OF FREE THYROXINE: CPT

## 2023-06-20 PROCEDURE — 84443 ASSAY THYROID STIM HORMONE: CPT

## 2023-06-22 ENCOUNTER — OFFICE VISIT (OUTPATIENT)
Dept: ENDOCRINOLOGY | Facility: CLINIC | Age: 83
End: 2023-06-22
Payer: COMMERCIAL

## 2023-06-22 VITALS
DIASTOLIC BLOOD PRESSURE: 68 MMHG | BODY MASS INDEX: 26.45 KG/M2 | HEART RATE: 68 BPM | SYSTOLIC BLOOD PRESSURE: 128 MMHG | TEMPERATURE: 98.4 F | HEIGHT: 58 IN | OXYGEN SATURATION: 98 % | WEIGHT: 126 LBS

## 2023-06-22 DIAGNOSIS — E55.9 VITAMIN D DEFICIENCY: ICD-10-CM

## 2023-06-22 DIAGNOSIS — M81.0 AGE-RELATED OSTEOPOROSIS WITHOUT CURRENT PATHOLOGICAL FRACTURE: ICD-10-CM

## 2023-06-22 DIAGNOSIS — E05.90 HYPERTHYROIDISM: Primary | ICD-10-CM

## 2023-06-22 PROBLEM — G89.29 CHRONIC PAIN OF RIGHT KNEE: Status: RESOLVED | Noted: 2021-07-02 | Resolved: 2023-06-22

## 2023-06-22 PROBLEM — R15.9 INCONTINENCE OF FECES: Status: RESOLVED | Noted: 2022-09-13 | Resolved: 2023-06-22

## 2023-06-22 PROBLEM — L03.319 CELLULITIS OF PUBIC REGION: Status: RESOLVED | Noted: 2019-08-26 | Resolved: 2023-06-22

## 2023-06-22 PROBLEM — Z13.6 SCREENING FOR CARDIOVASCULAR CONDITION: Status: RESOLVED | Noted: 2019-05-06 | Resolved: 2023-06-22

## 2023-06-22 PROBLEM — R15.1 FECAL SMEARING: Status: RESOLVED | Noted: 2022-11-21 | Resolved: 2023-06-22

## 2023-06-22 PROBLEM — E87.6 LOW BLOOD POTASSIUM: Status: RESOLVED | Noted: 2021-11-03 | Resolved: 2023-06-22

## 2023-06-22 PROBLEM — M25.561 CHRONIC PAIN OF RIGHT KNEE: Status: RESOLVED | Noted: 2021-07-02 | Resolved: 2023-06-22
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
## 2023-06-22 PROBLEM — S09.90XA HEAD INJURY: Status: RESOLVED | Noted: 2019-05-06 | Resolved: 2023-06-22

## 2023-06-22 PROBLEM — R73.03 PREDIABETES: Status: RESOLVED | Noted: 2019-05-06 | Resolved: 2023-06-22

## 2023-06-22 PROBLEM — R32 URINE INCONTINENCE: Status: RESOLVED | Noted: 2021-04-06 | Resolved: 2023-06-22

## 2023-06-22 PROBLEM — R89.9 ABNORMAL LABORATORY TEST: Status: RESOLVED | Noted: 2022-10-09 | Resolved: 2023-06-22

## 2023-06-22 PROBLEM — Z00.00 MEDICARE ANNUAL WELLNESS VISIT, SUBSEQUENT: Status: RESOLVED | Noted: 2018-08-12 | Resolved: 2023-06-22

## 2023-06-22 PROBLEM — M79.604: Status: RESOLVED | Noted: 2021-11-03 | Resolved: 2023-06-22

## 2023-06-22 PROBLEM — Z01.818 PRE-OP EXAM: Status: RESOLVED | Noted: 2022-05-20 | Resolved: 2023-06-22

## 2023-06-22 PROCEDURE — 99214 OFFICE O/P EST MOD 30 MIN: CPT | Performed by: INTERNAL MEDICINE

## 2023-06-22 RX ORDER — UREA 10 %
500 LOTION (ML) TOPICAL 2 TIMES DAILY
COMMUNITY

## 2023-06-22 NOTE — PATIENT INSTRUCTIONS
Zoledronic Acid (By injection)   Zoledronic Acid ([id number] AS-id)  Treats high blood calcium levels  Also treats bone damage caused by Paget disease, multiple myeloma, and cancers that spread to the bone  Also treats osteoporosis and reduces the risk of hip fractures in certain patients  Brand Name(s): Reclast, Zoledronic Acid Novaplus   There may be other brand names for this medicine  When This Medicine Should Not Be Used: This medicine is not right for everyone  You should not receive it if you had an allergic reaction to zoledronic acid, or if you are pregnant  How to Use This Medicine:   Injectable  A nurse or other health provider will give you this medicine  Your doctor will prescribe your dose and schedule  This medicine is given through a needle placed in a vein  Your doctor may tell you to drink extra liquids before your treatment to prevent kidney problems  Your doctor may also give you vitamin D and calcium supplements  Tell your doctor if you are not able to take these medicines  This medicine should come with a Medication Guide  Ask your pharmacist for a copy if you do not have one  Missed dose: You must use this medicine on a fixed schedule  Call your doctor or pharmacist if you miss a dose  Drugs and Foods to Avoid:   Ask your doctor or pharmacist before using any other medicine, including over-the-counter medicines, vitamins, and herbal products  Do not use other medicines that also contain zoledronic acid  Do not use zoledronic acid together with another bisphosphonate medicine  Some foods and medicines can affect how zoledronic acid works  Tell your doctor if you are using digoxin, antibiotics, diuretics (water pills), an NSAID pain or arthritis medicine (such as aspirin, celecoxib, ibuprofen, naproxen), steroid medicines, or cancer medicines  Warnings While Using This Medicine: It is not safe to take this medicine during pregnancy  It could harm an unborn baby   Tell your doctor right away if you become pregnant  Tell your doctor if you are breastfeeding, or if you have kidney disease, anemia, aspirin-sensitive asthma, bleeding problems, cancer, congestive heart failure, low blood calcium levels, stomach absorption problems, mineral imbalance, dental problems or gum disease  Also tell your doctor if you had surgery on your bowel or parathyroid or thyroid gland  This medicine may cause the following problems:  Jaw or teeth problems  Severe bone, joint, or muscle pain  Increased risk of thigh bone fracture  Low calcium levels in your blood  You must have regular dental exams while you are being treated with this medicine  Tell your dentist or oral surgeon that you are using this medicine  Your doctor will do lab tests at regular visits to check on the effects of this medicine  Keep all appointments  Possible Side Effects While Using This Medicine:   Call your doctor right away if you notice any of these side effects: Allergic reaction: Itching or hives, swelling in your face or hands, swelling or tingling in your mouth or throat, chest tightness, trouble breathing  Chest pain, trouble breathing, fast or uneven heartbeat  Decrease in how much or how often you urinate, blood in the urine, lower back or side pain, burning or painful urination  Muscle spasm or twitching, or numbness or tingling in your fingers, feet, or around your mouth  Pain, swelling, or numbness in the mouth or jaw, loose teeth or other teeth problems  Severe muscle, bone, or joint pain  Unusual pain in your thigh, groin, or hip  If you notice these less serious side effects, talk with your doctor:   Fever, chills, cough, sore throat, and body aches  Headache  Mild nausea, constipation, diarrhea, stomach pain or upset  Redness, pain, or swelling of your skin where the needle is placed  If you notice other side effects that you think are caused by this medicine, tell your doctor     Call your doctor for medical advice about side effects  You may report side effects to FDA at 9-721-FDA-8882  © Copyright [employer] 2022 Information is for End User's use only and may not be sold, redistributed or otherwise used for commercial purposes  The above information is an  only  It is not intended as medical advice for individual conditions or treatments  Talk to your doctor, nurse or pharmacist before following any medical regimen to see if it is safe and effective for you

## 2023-10-06 ENCOUNTER — RA CDI HCC (OUTPATIENT)
Dept: OTHER | Facility: HOSPITAL | Age: 83
End: 2023-10-06

## 2023-10-09 NOTE — ASSESSMENT & PLAN NOTE
Assessment and plan 1  Medicare subsequent annual wellness examination overall the patient is clinically stable and doing well, we encouraged the patient to follow a healthy and balanced diet  We recommend that the patient exercise routinely approximately 30 minutes 5 times per week   We have reviewed the patient's vaccines and have made recommendations for updates if necessary   annual flu vaccine     We will be ordering screening laboratories which are age appropriate  Return to the office in      call if any problems 
Compression stockings, leg elevation, sodium restriction
Hypothyroidism controlled the patient is currently euthyroid I will be ordering a TSH prior to the next office visit and the patient will continue with current medical regiment; we will continue to monitor the patient's progress 
Pre diabetes -I have counseled the patient to follow a healthy balanced diet, I have counseled patient reduce carbohydrates and sweets in the diet, I would like the patient exercise routinely  I will be checking hemoglobin A1c and comprehensive metabolic panel  Have counseled patient about the prevention of diabetes, and the risk of progression to type 2 diabetes 
She reports me symptoms compatible of urge incontinence at this point time she does not want treatment or evaluation would like to let me know about her symptoms and if they change or worsen she will notify me for further evaluation for now she will use a pad
Will have the patient see Dermatology
English

## 2023-10-13 ENCOUNTER — OFFICE VISIT (OUTPATIENT)
Dept: INTERNAL MEDICINE CLINIC | Facility: CLINIC | Age: 83
End: 2023-10-13
Payer: COMMERCIAL

## 2023-10-13 VITALS
HEIGHT: 58 IN | SYSTOLIC BLOOD PRESSURE: 128 MMHG | RESPIRATION RATE: 16 BRPM | WEIGHT: 120 LBS | OXYGEN SATURATION: 98 % | BODY MASS INDEX: 25.19 KG/M2 | DIASTOLIC BLOOD PRESSURE: 70 MMHG | HEART RATE: 62 BPM

## 2023-10-13 DIAGNOSIS — Z12.31 ENCOUNTER FOR SCREENING MAMMOGRAM FOR BREAST CANCER: ICD-10-CM

## 2023-10-13 DIAGNOSIS — Z13.6 SCREENING FOR CARDIOVASCULAR CONDITION: ICD-10-CM

## 2023-10-13 DIAGNOSIS — E55.9 VITAMIN D DEFICIENCY: ICD-10-CM

## 2023-10-13 DIAGNOSIS — M85.80 OSTEOPENIA, UNSPECIFIED LOCATION: ICD-10-CM

## 2023-10-13 DIAGNOSIS — L25.9 CONTACT DERMATITIS, UNSPECIFIED CONTACT DERMATITIS TYPE, UNSPECIFIED TRIGGER: Primary | ICD-10-CM

## 2023-10-13 DIAGNOSIS — E05.90 HYPERTHYROIDISM: ICD-10-CM

## 2023-10-13 DIAGNOSIS — Z23 NEED FOR VACCINATION: ICD-10-CM

## 2023-10-13 PROCEDURE — 99214 OFFICE O/P EST MOD 30 MIN: CPT | Performed by: INTERNAL MEDICINE

## 2023-10-13 PROCEDURE — 90662 IIV NO PRSV INCREASED AG IM: CPT

## 2023-10-13 PROCEDURE — G0008 ADMIN INFLUENZA VIRUS VAC: HCPCS

## 2023-10-13 RX ORDER — MOMETASONE FUROATE 1 MG/G
CREAM TOPICAL DAILY
Qty: 15 G | Refills: 0 | Status: SHIPPED | OUTPATIENT
Start: 2023-10-13

## 2023-10-13 NOTE — PROGRESS NOTES
Assessment/Plan:    Osteopenia  Continue calcium plus vitamin D supplementation, weightbearing exercises routine walking we will continue to monitor with routine DEXA scan    Hyperthyroidism  Patient's TSH is stabilized she is now off the antithyroid hormone reviewed endocrinology note and recent laboratories in detail with the patient patient is stable she reports me several family members with hypothyroidism    Encounter for screening mammogram for breast cancer  Counseled, will order a screening mammography    Contact dermatitis  Patient does report to me several intermittent episodes of itchy rash over the summer patient would like to be prepared and have a steroid cream on hand in case the rash does come back Rx for Elocon 0.1% cream apply to affected area once a day as needed provided    Vitamin D deficiency  Continue vitamin D3 1000 IUs once daily         Problem List Items Addressed This Visit        Endocrine    Hyperthyroidism     Patient's TSH is stabilized she is now off the antithyroid hormone reviewed endocrinology note and recent laboratories in detail with the patient patient is stable she reports me several family members with hypothyroidism            Musculoskeletal and Integument    Osteopenia     Continue calcium plus vitamin D supplementation, weightbearing exercises routine walking we will continue to monitor with routine DEXA scan         Contact dermatitis - Primary     Patient does report to me several intermittent episodes of itchy rash over the summer patient would like to be prepared and have a steroid cream on hand in case the rash does come back Rx for Elocon 0.1% cream apply to affected area once a day as needed provided         Relevant Medications    mometasone (ELOCON) 0.1 % cream       Other    Encounter for screening mammogram for breast cancer     Counseled, will order a screening mammography         Relevant Orders    Mammo screening bilateral w cad    Vitamin D deficiency Continue vitamin D3 1000 IUs once daily        Other Visit Diagnoses     Screening for cardiovascular condition        Relevant Orders    Comprehensive metabolic panel    Lipid Panel with Direct LDL reflex    Need for vaccination        Relevant Orders    influenza vaccine, high-dose, PF 0.7 mL (FLUZONE HIGH-DOSE) (Completed)            Return to office  6 months  call if any problems  The visit 30 minutes time of counseling greater than 15 minutes reviewing healthy balanced diet, treatment of hypothyroidism, review of her test and labs and specialist reports  Subjective:      Patient ID: Thalia Guardian is a 78 y.o. female. HPI 78-year old female coming in for a follow up office visit regarding hypothyroidism, osteopenia, vitamin D deficiency cardiovascular screening; the patient reports me compliant taking medications without untoward side effects the. The patient is here to review his medical condition, update me on the medical condition and the patient reports me no hospitalizations and no ER visits. No injuries no illnesses she does report to me intermittent itchy rash over the summer. Here to review laboratories in detail she does report 3 to of her family members have been diagnosed with hyperthyroidism. Patient is try to follow healthy balanced diet remains active. Pt reports memory is doing brother with memory problem  Getting over cold would like a flu shot,  right arm rash in the summer , now better with hot and gets bumps , very itch  The following portions of the patient's history were reviewed and updated as appropriate: allergies, current medications, past family history, past medical history, past social history, past surgical history and problem list.    Review of Systems   Constitutional:  Negative for activity change, appetite change and unexpected weight change. HENT:  Negative for congestion and postnasal drip. Eyes:  Negative for visual disturbance.    Respiratory:  Negative for cough and shortness of breath. Cardiovascular:  Negative for chest pain. Gastrointestinal:  Negative for abdominal pain, diarrhea, nausea and vomiting. Neurological:  Negative for dizziness, light-headedness and headaches. Hematological:  Negative for adenopathy. Objective:    Return in about 6 months (around 2024). No results found. No Known Allergies    Past Medical History:   Diagnosis Date   • Arthritis    • Colon cancer [facility])     age 45   • Colon polyp    • GI malignancy ([address] [facility]) 2022     Past Surgical History:   Procedure Laterality Date   •  SECTION      43 years ago   • [address] [facility] [place]    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   • JOINT REPLACEMENT  2022    R TKA   • TOTAL ABDOMINAL HYSTERECTOMY Bilateral     age 37   • TOTAL ABDOMINAL HYSTERECTOMY W/ BILATERAL SALPINGOOPHORECTOMY Bilateral     fibroids; age 37     Current Outpatient Medications on File Prior to Visit   Medication Sig Dispense Refill   • Biotin 1000 MCG tablet Take 1,000 mcg by mouth daily     • Calcium 500-125 MG-UNIT TABS Take 1 tablet by mouth daily     • Carboxymethylcellulose Sodium (RETAINE [facility] OP) Apply to eye     • Cholecalciferol (VITAMIN D3) 1000 units CAPS Take by mouth     • cyanocobalamin (VITAMIN B-12) 500 MCG tablet Take 500 mcg by mouth daily     • magnesium gluconate (MAGONATE) 500 mg tablet Take 500 mg by mouth 2 (two) times a day     • Multiple Vitamin (MULTIVITAMIN) tablet Take 1 tablet by mouth daily     • Omega-3 Fatty Acids (OMEGA-3 FISH OIL) 1200 MG CAPS Take by mouth     • Probiotic Product (PROBIOTIC-10) CAPS Take by mouth     • RESTASIS 0.05 % ophthalmic emulsion      • zinc gluconate 50 mg tablet Take 50 mg by mouth daily       No current facility-administered medications on file prior to visit.      Family History   Problem Relation Age of Onset   • No Known Problems Mother    • No Known Problems Father    • Cancer Family    • Lung cancer Sister 80   • Colon cancer Sister 79   • Breast cancer Sister 61   • No Known Problems Daughter    • No Known Problems Maternal Aunt    • No Known Problems Maternal Grandmother    • No Known Problems Maternal Grandfather    • No Known Problems Paternal Grandmother    • No Known Problems Paternal Grandfather      Social History     Socioeconomic History   • Marital status:      Spouse name: Not on file   • Number of children: Not on file   • Years of education: Not on file   • Highest education level: Not on file   Occupational History   • Not on file   Tobacco Use   • Smoking status: Former     Packs/day: 0.25     Years: 17.00     Total pack years: 4.25     Types: Cigarettes     Start date: 63     Quit date: 10     Years since quittin.8   • Smokeless tobacco: Never   Vaping Use   • Vaping Use: Never used   Substance and Sexual Activity   • Alcohol use: No   • Drug use: No   • Sexual activity: Not Currently     Partners: Male   Other Topics Concern   • Not on file   Social History Narrative   • Not on file     Social Determinants of Health     Financial Resource Strain: Low Risk  (2023)    Overall Financial Resource Strain (CARDIA)    • Difficulty of Paying Living Expenses: Not hard at all   Food Insecurity: Not on file   Transportation Needs: No Transportation Needs (2023)    PRAPARE - Transportation    • Lack of Transportation (Medical): No    • Lack of Transportation (Non-Medical):  No   Physical Activity: Not on file   Stress: Not on file   Social Connections: Not on file   Intimate Partner Violence: Not on file   Housing Stability: Not on file     Vitals:    10/13/23 1044   BP: 128/70   Pulse: 62   Resp: 16   SpO2: 98%   Weight: 54.4 kg (120 lb)   Height: 4' 9.5" (1.461 m)     Results for orders placed or performed in visit on 23   T4, free   Result Value Ref Range    Free T4 1.12 0.61 - 1.12 ng/dL   T4   Result Value Ref Range    T4 TOTAL 9.21 6.09 - 12.23 ug/dL   T3   Result Value Ref Range    T3, Total 1.0 0.9-1.8 ng/mL ng/mL   TSH, 3rd generation   Result Value Ref Range    TSH 3RD GENERATON <0.010 (L) 0.450 - 4.500 uIU/mL     Weight (last 2 days)     Date/Time Weight    10/13/23 1044 54.4 (120)        Body mass index is 25.52 kg/m². BP      Temp      Pulse     Resp      SpO2        Vitals:    10/13/23 1044   Weight: 54.4 kg (120 lb)     Vitals:    10/13/23 1044   Weight: 54.4 kg (120 lb)       /70   Pulse 62   Resp 16   Ht 4' 9.5" (1.461 m)   Wt 54.4 kg (120 lb)   SpO2 98%   BMI 25.52 kg/m²          Physical Exam  Vitals and nursing note reviewed. Constitutional:       General: She is not in acute distress. Appearance: Normal appearance. She is well-developed. She is not ill-appearing, toxic-appearing or diaphoretic. HENT:      Head: Normocephalic. Eyes:      General: No scleral icterus. Right eye: No discharge. Left eye: No discharge. Conjunctiva/sclera: Conjunctivae normal.      Pupils: Pupils are equal, round, and reactive to light. Cardiovascular:      Rate and Rhythm: Normal rate and regular rhythm. Heart sounds: Normal heart sounds. No murmur heard. No friction rub. No gallop. Pulmonary:      Effort: No respiratory distress. Breath sounds: Normal breath sounds. No wheezing or rales. Abdominal:      General: Bowel sounds are normal. There is no distension. Palpations: Abdomen is soft. There is no mass. Tenderness: There is no abdominal tenderness. There is no guarding or rebound. Musculoskeletal:         General: No deformity. Cervical back: Neck supple. Lymphadenopathy:      Cervical: No cervical adenopathy. Neurological:      Mental Status: She is alert.       Coordination: Coordination normal.

## 2023-10-14 PROBLEM — Z12.31 ENCOUNTER FOR SCREENING MAMMOGRAM FOR BREAST CANCER: Status: ACTIVE | Noted: 2019-05-06

## 2023-10-14 PROBLEM — L25.9 CONTACT DERMATITIS: Status: ACTIVE | Noted: 2023-10-14

## 2023-10-15 NOTE — ASSESSMENT & PLAN NOTE
Patient's TSH is stabilized she is now off the antithyroid hormone reviewed endocrinology note and recent laboratories in detail with the patient patient is stable she reports me several family members with hypothyroidism

## 2023-10-15 NOTE — ASSESSMENT & PLAN NOTE
Patient does report to me several intermittent episodes of itchy rash over the summer patient would like to be prepared and have a steroid cream on hand in case the rash does come back Rx for Elocon 0.1% cream apply to affected area once a day as needed provided

## 2023-10-15 NOTE — ASSESSMENT & PLAN NOTE
Continue calcium plus vitamin D supplementation, weightbearing exercises routine walking we will continue to monitor with routine DEXA scan

## 2023-10-20 ENCOUNTER — TELEPHONE (OUTPATIENT)
Age: 83
End: 2023-10-20

## 2023-12-11 ENCOUNTER — APPOINTMENT (OUTPATIENT)
Dept: LAB | Facility: CLINIC | Age: 83
End: 2023-12-11
Payer: COMMERCIAL

## 2023-12-11 DIAGNOSIS — E05.90 HYPERTHYROIDISM: ICD-10-CM

## 2023-12-11 LAB
T3 SERPL-MCNC: 0.9 NG/ML
T4 FREE SERPL-MCNC: 0.84 NG/DL (ref 0.61–1.12)
TSH SERPL DL<=0.05 MIU/L-ACNC: 0.77 UIU/ML (ref 0.45–4.5)

## 2023-12-11 PROCEDURE — 84439 ASSAY OF FREE THYROXINE: CPT

## 2023-12-11 PROCEDURE — 84480 ASSAY TRIIODOTHYRONINE (T3): CPT

## 2023-12-11 PROCEDURE — 36415 COLL VENOUS BLD VENIPUNCTURE: CPT

## 2023-12-11 PROCEDURE — 84443 ASSAY THYROID STIM HORMONE: CPT

## 2023-12-13 ENCOUNTER — OFFICE VISIT (OUTPATIENT)
Dept: ENDOCRINOLOGY | Facility: CLINIC | Age: 83
End: 2023-12-13
Payer: COMMERCIAL

## 2023-12-13 VITALS
TEMPERATURE: 98.3 F | BODY MASS INDEX: 26.32 KG/M2 | RESPIRATION RATE: 14 BRPM | OXYGEN SATURATION: 97 % | DIASTOLIC BLOOD PRESSURE: 92 MMHG | WEIGHT: 122 LBS | SYSTOLIC BLOOD PRESSURE: 142 MMHG | HEART RATE: 57 BPM | HEIGHT: 57 IN

## 2023-12-13 DIAGNOSIS — M81.0 AGE-RELATED OSTEOPOROSIS WITHOUT CURRENT PATHOLOGICAL FRACTURE: ICD-10-CM

## 2023-12-13 DIAGNOSIS — E05.90 HYPERTHYROIDISM: Primary | ICD-10-CM

## 2023-12-13 DIAGNOSIS — E55.9 VITAMIN D DEFICIENCY: ICD-10-CM

## 2023-12-13 PROCEDURE — 99214 OFFICE O/P EST MOD 30 MIN: CPT | Performed by: INTERNAL MEDICINE

## 2023-12-13 NOTE — PROGRESS NOTES
Follow-up Patient Progress Note      CC: Graves disease     History of Present Illness:   78-year-old female with osteoporosis, vitamin D deficiency, DJD, diabetes, colonic polyps and recently diagnosed mild hyperthyroidism 2" Graves disease c +ve TSI. Last visit was 23. Since last visit, weight is same. She is essentially asymptomatic. Recent nuclear uptake scan was unremarkable. She stopped methimazole 23. No History of external radiation, recent Iodine loading or radiological diagnostic studies. No difficulty with swallowing or breathing or change in voice. 3/8/2023 DEXA: T-scores of -2.0 LS, -2.0 LTH, -2.4 LFN, -2.8 right forearm. Consistent with osteoporosis. 10-year FRAX score of 3.7% hip and 11% major osteoporotic fracture. Family Hx of thyroid:nephew - graves. Mother - hypothyroidism. Sister - hypothyroidism.     Patient Active Problem List   Diagnosis    Osteopenia    Benign positional vertigo, left    Encounter for screening mammogram for breast cancer    Absence of bladder continence    Venous insufficiency    Skin lesion    Sciatica of right side    After cataract, right eye    Iron deficiency anemia    Functional bowel disorder    Hyperthyroidism    Vitamin D deficiency    Contact dermatitis     Past Medical History:   Diagnosis Date    Arthritis     Colon cancer ([address] [facility])     age 45    Colon polyp     GI malignancy ([address]) 2022      Past Surgical History:   Procedure Laterality Date     SECTION      37 years ago    [address]    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    JOINT REPLACEMENT  2022    R TKA    TOTAL ABDOMINAL HYSTERECTOMY Bilateral     age 37    TOTAL ABDOMINAL HYSTERECTOMY W/ BILATERAL SALPINGOOPHORECTOMY Bilateral     fibroids; age 37      Family History   Problem Relation Age of Onset    No Known Problems Mother     No Known Problems Father     Cancer Family     Lung cancer Sister 80    Colon cancer Sister 79    Breast cancer Sister 60    No Known Problems Daughter     No Known Problems Maternal Aunt     No Known Problems Maternal Grandmother     No Known Problems Maternal Grandfather     No Known Problems Paternal Grandmother     No Known Problems Paternal Grandfather      Social History     Tobacco Use    Smoking status: Former     Current packs/day: 0.00     Average packs/day: 0.3 packs/day for 17.0 years (4.3 ttl pk-yrs)     Types: Cigarettes     Start date: 63     Quit date:      Years since quittin.9    Smokeless tobacco: Never   Substance Use Topics    Alcohol use: No     No Known Allergies      Current Outpatient Medications:     Biotin 1000 MCG tablet, Take 1,000 mcg by mouth daily, Disp: , Rfl:     Calcium 500-125 MG-UNIT TABS, Take 1 tablet by mouth daily, Disp: , Rfl:     Carboxymethylcellulose Sodium (RETAINE CMC OP), Apply to eye, Disp: , Rfl:     Cholecalciferol (VITAMIN D3) 1000 units CAPS, Take by mouth, Disp: , Rfl:     cyanocobalamin (VITAMIN B-12) 500 MCG tablet, Take 500 mcg by mouth daily, Disp: , Rfl:     magnesium gluconate (MAGONATE) 500 mg tablet, Take 500 mg by mouth 2 (two) times a day, Disp: , Rfl:     Multiple Vitamin (MULTIVITAMIN) tablet, Take 1 tablet by mouth daily, Disp: , Rfl:     Omega-3 Fatty Acids (OMEGA-3 FISH OIL) 1200 MG CAPS, Take by mouth, Disp: , Rfl:     Probiotic Product (PROBIOTIC-10) CAPS, Take by mouth, Disp: , Rfl:     RESTASIS 0.05 % ophthalmic emulsion, , Disp: , Rfl:     zinc gluconate 50 mg tablet, Take 50 mg by mouth daily, Disp: , Rfl:     mometasone (ELOCON) 0.1 % cream, Apply topically daily (Patient not taking: Reported on 2023), Disp: 15 g, Rfl: 0    Review of Systems   HENT: Negative. Eyes: Negative. Respiratory: Negative. Cardiovascular: Negative. Gastrointestinal: Negative. Endocrine: Negative. Musculoskeletal: Negative. Skin: Negative. Allergic/Immunologic: Negative. Neurological: Negative. Hematological: Negative. Psychiatric/Behavioral: Negative. Physical Exam:  Body mass index is 26.4 kg/m². /92 (BP Location: Left arm, Patient Position: Sitting)   Pulse 57   Temp 98.3 °F (36.8 °C) (Tympanic)   Resp 14   Ht 4' 9" (1.448 m)   Wt 55.3 kg (122 lb) Comment: weight reported by patient, patient refused to step on scale  SpO2 97%   BMI 26.40 kg/m²    Vitals:    12/13/23 1109   Weight: 55.3 kg (122 lb)        Physical Exam  Constitutional:       General: She is not in acute distress. Appearance: She is well-developed. She is not ill-appearing. HENT:      Head: Normocephalic and atraumatic. Nose: Nose normal.      Mouth/Throat:      Pharynx: Oropharynx is clear. Eyes:      Extraocular Movements: Extraocular movements intact. Conjunctiva/sclera: Conjunctivae normal.   Neck:      Thyroid: No thyromegaly. Cardiovascular:      Rate and Rhythm: Normal rate. Pulmonary:      Effort: Pulmonary effort is normal.   Musculoskeletal:         General: No deformity. Cervical back: Normal range of motion. Skin:     Capillary Refill: Capillary refill takes less than 2 seconds. Coloration: Skin is not pale. Findings: No rash. Neurological:      Mental Status: She is alert and oriented to person, place, and time.    Psychiatric:         Behavior: Behavior normal.         Labs:   Lab Results   Component Value Date    HGBA1C 5.8 (H) 09/29/2022       Lab Results   Component Value Date    MVG7QCKWVKOF 0.769 12/11/2023    V1ZLTDM 0.9 12/11/2023    B0YRKKT 9.21 06/20/2023       Lab Results   Component Value Date    CREATININE 0.54 (L) 09/29/2022    CREATININE 0.75 12/02/2021    CREATININE 0.70 11/01/2021    BUN 18 09/29/2022     07/24/2015    K 3.7 09/29/2022     09/29/2022    CO2 28 09/29/2022     eGFR   Date Value Ref Range Status   09/29/2022 86 ml/min/1.73sq m Final       Lab Results   Component Value Date    ALT 26 09/29/2022    AST 25 09/29/2022    GGT 78 11/03/2022    ALKPHOS 168 (H) 09/29/2022    BILITOT 0.56 07/24/2015       Lab Results   Component Value Date    CHOLESTEROL 156 09/29/2022    CHOLESTEROL 192 11/01/2021    CHOLESTEROL 199 04/02/2021     Lab Results   Component Value Date    HDL 80 09/29/2022    HDL 92 11/01/2021     04/02/2021     Lab Results   Component Value Date    TRIG 92 09/29/2022    TRIG 69 11/01/2021    TRIG 59 04/02/2021     Lab Results   Component Value Date    [address] 08/06/2018         Impression:  1. Hyperthyroidism    2. Age-related osteoporosis without current pathological fracture    3. Vitamin D deficiency         Plan:    Celena Dominguez was seen today for follow-up. Diagnoses and all orders for this visit:    Hyperthyroidism 2" Graves disease. She remains clinically and biochemically euthyroid. TSH has recovered  and now detectable. fT3 and T3 are normal.    Today we discussed options and agreed to wait and watch only at this time. Will repeat labs as listed below and follow up in 6 months. -     T4, free; Future  -     TSH, 3rd generation; Future  -     T3; Future  -     Thyroid stimulating immunoglobulin; Future    Age related osteoporosis without pathological fracture. Reviewed options and I recommend either Prolia or reclast. Favor prolia as less side effects. Vitamin D deficiency        I have spent 32 minutes with patient today in which greater than 50% of this time was spent in counseling/coordination of care. Discussed with the patient and all questioned fully answered. She will call me if any problems arise. Educated/ Counseled patient on diagnostic test results, prognosis, risk vs benefit of treatment options, importance of treatment compliance, healthy life and lifestyle choices.       [employer]

## 2023-12-13 NOTE — PATIENT INSTRUCTIONS
Denosumab (By injection)   Denosumab ([id number]-[id number]-sy-mab)  Treats osteoporosis, bone cancer, hypercalcemia, and other bone problems in patients who have cancer. Brand Name(s): Prolia, Xgeva   There may be other brand names for this medicine. When This Medicine Should Not Be Used: This medicine is not right for everyone. You should not receive it if you had an allergic reaction to denosumab, or if you are pregnant. How to Use This Medicine:   Injectable  Your doctor will prescribe your exact dose and tell you how often it should be given. This medicine is given as a shot under your skin. It is usually given in your upper arm, upper thigh, or stomach. A nurse or other health provider will give you this medicine. You may receive other medicines (including calcium supplements, Vitamin D) to prevent unwanted effects. This medicine should come with a Medication Guide. Ask your pharmacist for a copy if you do not have one. Missed dose: Call your doctor or pharmacist for instructions. Drugs and Foods to Avoid:   Ask your doctor or pharmacist before using any other medicine, including over-the-counter medicines, vitamins, and herbal products. Do not use Prolia® and Xgeva® together. They contain the same medicine. Some medicines can affect how denosumab works. Tell your doctor if you are also using medicine that weakens your immune system, including a steroid or cancer medicine. Warnings While Using This Medicine: It is not safe to take this medicine during pregnancy. It could harm an unborn baby. Tell your doctor right away if you become pregnant. If you are a woman who can get pregnant, your doctor may do tests to make sure you are not pregnant before receiving this medicine. Use an effective form of birth control to keep from getting pregnant during treatment with this medicine and for 5 months after the last dose.   Tell your doctor if you are breastfeeding, or if you have kidney disease, diabetes, gum disease, or a history of fractures. Tell your doctor if you have problems with your thyroid, parathyroid, or digestive system. This medicine may cause the following problems:  Increased risk of broken thigh bone  Increased risk of infections  Serious skin reactions  Severe bone, joint, or muscle pain  This medicine can cause jaw problems. You must have regular dental exams while you are being treated with this medicine. Tell your dentist that you are receiving this medicine. Practice good oral hygiene. Do not suddenly stop using this medicine without checking first with your doctor. Doing so may increase your risk for more fractures. Talk to your doctor about other medicines that you can take. Your doctor will do lab tests at regular visits to check on the effects of this medicine. Keep all appointments. Possible Side Effects While Using This Medicine:   Call your doctor right away if you notice any of these side effects:   Allergic reaction: Itching or hives, swelling in your face or hands, swelling or tingling in your mouth or throat, chest tightness, trouble breathing  Blistering, peeling, red skin rash  Chest pain, fast or uneven heartbeat, trouble breathing  Fever, chills, cough, sore throat, body aches  Lightheadedness, dizziness, fainting  Muscle spasms or twitching, numbness or tingling in your fingers, toes, or lips  Pain or burning during urination, change in how much or how often you urinate  Pain, swelling, heavy feeling, or numbness in your mouth or jaw, loose teeth or other teeth problems  Severe bone, joint, or muscle pain  Sudden and severe stomach pain, nausea, vomiting  Unusual pain in your thigh, groin, or hip  If you notice these less serious side effects, talk with your doctor:   Arm or leg pain  Diarrhea  Redness, pain, itching, burning, swelling, or a lump under your skin where the shot was given  Tiredness or weakness  If you notice other side effects that you think are caused by this medicine, tell your doctor. Call your doctor for medical advice about side effects. You may report side effects to FDA at 6-563-FDA-4844  © Copyright Abdulaziz Elise 2023 Information is for End User's use only and may not be sold, redistributed or otherwise used for commercial purposes. The above information is an  only. It is not intended as medical advice for individual conditions or treatments. Talk to your doctor, nurse or pharmacist before following any medical regimen to see if it is safe and effective for you. Zoledronic Acid (By injection)   Zoledronic Acid ([id number] AS-id)  Treats high blood calcium levels. Also treats bone damage caused by Paget disease, multiple myeloma, and cancers that spread to the bone. Also treats osteoporosis and reduces the risk of hip fractures in certain patients. Brand Name(s): Reclast, Zoledronic Acid Novaplus   There may be other brand names for this medicine. When This Medicine Should Not Be Used: This medicine is not right for everyone. You should not receive it if you had an allergic reaction to zoledronic acid, or if you are pregnant. How to Use This Medicine:   Injectable  A nurse or other health provider will give you this medicine. Your doctor will prescribe your dose and schedule. This medicine is given through a needle placed in a vein. Your doctor may tell you to drink extra liquids before your treatment to prevent kidney problems. Your doctor may also give you vitamin D and calcium supplements. Tell your doctor if you are not able to take these medicines. This medicine should come with a Medication Guide. Ask your pharmacist for a copy if you do not have one. Missed dose: You must use this medicine on a fixed schedule. Call your doctor or pharmacist if you miss a dose. Drugs and Foods to Avoid:   Ask your doctor or pharmacist before using any other medicine, including over-the-counter medicines, vitamins, and herbal products.   Do not use other medicines that also contain zoledronic acid. Do not use zoledronic acid together with another bisphosphonate medicine. Some foods and medicines can affect how zoledronic acid works. Tell your doctor if you are using digoxin, antibiotics, diuretics (water pills), an NSAID pain or arthritis medicine (such as aspirin, celecoxib, ibuprofen, naproxen), steroid medicines, or cancer medicines. Warnings While Using This Medicine: It is not safe to take this medicine during pregnancy. It could harm an unborn baby. Tell your doctor right away if you become pregnant. Tell your doctor if you are breastfeeding, or if you have kidney disease, anemia, aspirin-sensitive asthma, bleeding problems, cancer, congestive heart failure, low blood calcium levels, stomach absorption problems, mineral imbalance, dental problems or gum disease. Also tell your doctor if you had surgery on your bowel or parathyroid or thyroid gland. This medicine may cause the following problems:  Jaw or teeth problems  Severe bone, joint, or muscle pain  Increased risk of thigh bone fracture  Low calcium levels in your blood  You must have regular dental exams while you are being treated with this medicine. Tell your dentist or oral surgeon that you are using this medicine. Your doctor will do lab tests at regular visits to check on the effects of this medicine. Keep all appointments. Possible Side Effects While Using This Medicine:   Call your doctor right away if you notice any of these side effects:   Allergic reaction: Itching or hives, swelling in your face or hands, swelling or tingling in your mouth or throat, chest tightness, trouble breathing  Chest pain, trouble breathing, fast or uneven heartbeat  Decrease in how much or how often you urinate, blood in the urine, lower back or side pain, burning or painful urination  Muscle spasm or twitching, or numbness or tingling in your fingers, feet, or around your mouth  Pain, swelling, or numbness in the mouth or jaw, loose teeth or other teeth problems  Severe muscle, bone, or joint pain  Unusual pain in your thigh, groin, or hip  If you notice these less serious side effects, talk with your doctor:   Fever, chills, cough, sore throat, and body aches  Headache  Mild nausea, constipation, diarrhea, stomach pain or upset  Redness, pain, or swelling of your skin where the needle is placed  If you notice other side effects that you think are caused by this medicine, tell your doctor. Call your doctor for medical advice about side effects. You may report side effects to FDA at 5-933-FDA-6041  © Copyright Kasie Rush 2023 Information is for End User's use only and may not be sold, redistributed or otherwise used for commercial purposes. The above information is an  only. It is not intended as medical advice for individual conditions or treatments. Talk to your doctor, nurse or pharmacist before following any medical regimen to see if it is safe and effective for you.

## 2023-12-14 ENCOUNTER — TELEPHONE (OUTPATIENT)
Age: 83
End: 2023-12-14

## 2023-12-14 NOTE — TELEPHONE ENCOUNTER
Patient wanted to call to speak to Dr. Ben Villarreal regarding what she discussed with her endo doc. She stated that it was recommended for her to start prolia and wanted your opinion.   Acosta Pacheco

## 2023-12-18 ENCOUNTER — HOSPITAL ENCOUNTER (OUTPATIENT)
Dept: RADIOLOGY | Facility: HOSPITAL | Age: 83
Discharge: HOME | End: 2023-12-18
Payer: COMMERCIAL

## 2023-12-18 DIAGNOSIS — Z12.31 ENCOUNTER FOR SCREENING MAMMOGRAM FOR MALIGNANT NEOPLASM OF BREAST: ICD-10-CM

## 2023-12-18 PROCEDURE — 77067 SCR MAMMO BI INCL CAD: CPT

## 2023-12-18 PROCEDURE — 77063 BREAST TOMOSYNTHESIS BI: CPT

## 2023-12-20 ENCOUNTER — HOSPITAL ENCOUNTER (OUTPATIENT)
Dept: RADIOLOGY | Facility: HOSPITAL | Age: 83
Discharge: HOME | End: 2023-12-20

## 2023-12-21 ENCOUNTER — TELEPHONE (OUTPATIENT)
Age: 83
End: 2023-12-21

## 2023-12-21 ENCOUNTER — TELEPHONE (OUTPATIENT)
Dept: ENDOCRINOLOGY | Facility: CLINIC | Age: 83
End: 2023-12-21

## 2023-12-21 NOTE — TELEPHONE ENCOUNTER
Patient called as an FYI to Dr. Lee that she has cancelled her Prolia injections due to possible side effects involving her recent dental implants.

## 2023-12-21 NOTE — TELEPHONE ENCOUNTER
Pt called to cancel her first prolia injection on 1/11/2024. Wanted me to inform provider of reason why. Said she had oral implant failures in the past and is also getting a tooth extracted in January. Wants to look more into the reasons behind her oral issues before proceeding with Prolia because she thinks it could be related to bone density.

## 2024-01-30 NOTE — PROGRESS NOTES
"  Follow-up Patient Progress Note      CC: Graves disease     History of Present Illness:   78-year-old female with osteoporosis, vitamin D deficiency, DJD, diabetes, colonic polyps and recently diagnosed mild hyperthyroidism 2\" Graves disease c +ve TSI  Last visit was 22  Since last visit, weight is same  She is essentially asymptomatic  Recent nuclear uptake scan was unremarkable  She stopped methimazole 23      No History of external radiation, recent Iodine loading or radiological diagnostic studies  No difficulty with swallowing or breathing or change in voice  3/8/2023 DEXA: T-scores of -2 0 LS, -2 0 LTH, -2 4 LFN, -2 8 right forearm  Consistent with osteoporosis  10-year FRAX score of 3 7% hip and 11% major osteoporotic fracture  Family Hx of thyroid:nephew - graves  Mother - hypothyroidism  Sister - hypothyroidism      Patient Active Problem List   Diagnosis   • Medicare annual wellness visit, subsequent   • Osteopenia   • Fall   • Benign positional vertigo, left   • Head injury   • Prediabetes   • Screening for cardiovascular condition   • Absence of bladder continence   • Cellulitis of pubic region   • Venous insufficiency   • Skin lesion   • Urine incontinence   • Chronic pain of right knee   • Sciatica of right side   • Lower limb pain, posterior, right   • Low blood potassium   • After cataract, right eye   • Pre-op exam   • Iron deficiency anemia   • Incontinence of feces   • Functional bowel disorder   • Hyperthyroidism   • Abnormal laboratory test   • Vitamin D deficiency   • Fecal smearing     Past Medical History:   Diagnosis Date   • Arthritis    • Colon cancer [facility])     age 45   • Colon polyp    • GI malignancy ([facility] [address] ) 2022      Past Surgical History:   Procedure Laterality Date   •  SECTION      43 years ago   • [address]    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   • JOINT REPLACEMENT  2022    R TKA   • TOTAL ABDOMINAL HYSTERECTOMY Bilateral     " age 37   • TOTAL ABDOMINAL HYSTERECTOMY W/ BILATERAL SALPINGOOPHORECTOMY Bilateral 1983    fibroids; age 37      Family History   Problem Relation Age of Onset   • No Known Problems Mother    • No Known Problems Father    • Cancer Family    • Lung cancer Sister 80   • Colon cancer Sister 79   • Breast cancer Sister 61   • No Known Problems Daughter    • No Known Problems Maternal Aunt    • No Known Problems Maternal Grandmother    • No Known Problems Maternal Grandfather    • No Known Problems Paternal Grandmother    • No Known Problems Paternal Grandfather      Social History     Tobacco Use   • Smoking status: Former     Packs/day: 0 25     Years: 17 00     Total pack years: 4 25     Types: Cigarettes     Start date:      Quit date:      Years since quittin 5   • Smokeless tobacco: Never   Substance Use Topics   • Alcohol use: No     No Known Allergies      Current Outpatient Medications:   •  Biotin 1000 MCG tablet, Take 1,000 mcg by mouth daily, Disp: , Rfl:   •  Calcium 500-125 MG-UNIT TABS, Take 1 tablet by mouth daily, Disp: , Rfl:   •  Carboxymethylcellulose Sodium (RETAINE CMC OP), Apply to eye, Disp: , Rfl:   •  Cholecalciferol (VITAMIN D3) 1000 units CAPS, Take by mouth, Disp: , Rfl:   •  cyanocobalamin (VITAMIN B-12) 500 MCG tablet, Take 500 mcg by mouth daily, Disp: , Rfl:   •  magnesium gluconate (MAGONATE) 500 mg tablet, Take 500 mg by mouth 2 (two) times a day, Disp: , Rfl:   •  Multiple Vitamin (MULTIVITAMIN) tablet, Take 1 tablet by mouth daily, Disp: , Rfl:   •  Omega-3 Fatty Acids (OMEGA-3 FISH OIL) 1200 MG CAPS, Take by mouth, Disp: , Rfl:   •  Probiotic Product (PROBIOTIC-10) CAPS, Take by mouth, Disp: , Rfl:   •  RESTASIS 0 05 % ophthalmic emulsion, , Disp: , Rfl:   •  zinc gluconate 50 mg tablet, Take 50 mg by mouth daily, Disp: , Rfl:     Review of Systems   HENT: Negative  Eyes: Negative  Respiratory: Negative  Cardiovascular: Negative  Gastrointestinal: Negative  "  Endocrine: Negative  Musculoskeletal: Negative  Skin: Negative  Allergic/Immunologic: Negative  Neurological: Negative  Hematological: Negative  Psychiatric/Behavioral: Negative  Physical Exam:  Body mass index is 26 79 kg/m²  /68 (BP Location: Left arm, Patient Position: Sitting, Cuff Size: Standard)   Pulse 68   Temp 98 4 °F (36 9 °C)   Ht 4' 9 5\" (1 461 m)   Wt 57 2 kg (126 lb)   SpO2 98%   BMI 26 79 kg/m²    Vitals:    06/22/23 1111   Weight: 57 2 kg (126 lb)        Physical Exam  Constitutional:       General: She is not in acute distress  Appearance: She is well-developed  She is not ill-appearing  HENT:      Head: Normocephalic and atraumatic  Nose: Nose normal       Mouth/Throat:      Pharynx: Oropharynx is clear  Eyes:      Extraocular Movements: Extraocular movements intact  Conjunctiva/sclera: Conjunctivae normal    Neck:      Thyroid: No thyromegaly  Cardiovascular:      Rate and Rhythm: Normal rate  Pulmonary:      Effort: Pulmonary effort is normal    Musculoskeletal:         General: No deformity  Cervical back: Normal range of motion  Skin:     Capillary Refill: Capillary refill takes less than 2 seconds  Coloration: Skin is not pale  Findings: No rash  Neurological:      Mental Status: She is alert and oriented to person, place, and time     Psychiatric:         Behavior: Behavior normal          Labs:   Lab Results   Component Value Date    HGBA1C 5 8 (H) 09/29/2022       Lab Results   Component Value Date    WPF3IGSCVIUO <0 010 (L) 06/20/2023    V4XMLTY 1 0 06/20/2023    Z1UHYQG 9 21 06/20/2023       Lab Results   Component Value Date    CREATININE 0 54 (L) 09/29/2022    CREATININE 0 75 12/02/2021    CREATININE 0 70 11/01/2021    BUN 18 09/29/2022     07/24/2015    K 3 7 09/29/2022     09/29/2022    CO2 28 09/29/2022     eGFR   Date Value Ref Range Status   09/29/2022 86 ml/min/1 73sq m Final       Lab " Results   Component Value Date    ALT 26 09/29/2022    AST 25 09/29/2022    GGT 78 11/03/2022    ALKPHOS 168 (H) 09/29/2022    BILITOT 0 56 07/24/2015       Lab Results   Component Value Date    CHOLESTEROL 156 09/29/2022    CHOLESTEROL 192 11/01/2021    CHOLESTEROL 199 04/02/2021     Lab Results   Component Value Date    HDL 80 09/29/2022    HDL 92 11/01/2021     04/02/2021     Lab Results   Component Value Date    TRIG 92 09/29/2022    TRIG 69 11/01/2021    TRIG 59 04/02/2021     Lab Results   Component Value Date    [place] 91 08/06/2018         Impression:  1  Hyperthyroidism    2  Age-related osteoporosis without current pathological fracture    3  Vitamin D deficiency         Plan:    Diagnoses and all orders for this visit:    Hyperthyroidism  Secondary to Graves' disease  Nuclear scan was unremarkable  TSI was positive  She seems to be clinically stable associated with normal T4 and T3 in spite of holding methimazole for approximately 4 weeks  TSH however remains suppressed  Today we discussed options and agreed to repeat labs at roughly 3 months or if there are symptoms based on which further decisions can be made  Follow-up in 6 months  -     T4, free; Future  -     TSH, 3rd generation; Future  -     T3; Future    Age-related osteoporosis without current pathological fracture  Recent DEXA scan was suggestive of osteoporosis  This is associated with a high 10-year FRAX score that would require medical therapy as per guidelines  Today we discussed all aspects of osteoporosis including pathophysiology, risk factors, complications, diet, calcium 1200mg/day, vitamin D supplementation to maintain goal >30ng/dL, lifestyle modifications, medical fitness training, goals of therapy, follow up needs and medications  She may benefit from bisphosphonate and I suggest using Reclast which is once a year IV infusion  Patient hopes to avoid daily intake of medications  Vitamin D deficiency  Advised vitamin D3 2000 IU daily OTC  I have spent 31 minutes with patient today in which greater than 50% of this time was spent in counseling/coordination of care  Discussed with the patient and all questioned fully answered  She will call me if any problems arise  Educated/ Counseled patient on diagnostic test results, prognosis, risk vs benefit of treatment options, importance of treatment compliance, healthy life and lifestyle choices        [address] [employer]

## 2024-04-15 ENCOUNTER — APPOINTMENT (OUTPATIENT)
Dept: LAB | Facility: CLINIC | Age: 84
End: 2024-04-15
Payer: COMMERCIAL

## 2024-04-15 DIAGNOSIS — E05.90 HYPERTHYROIDISM: ICD-10-CM

## 2024-04-15 DIAGNOSIS — R73.03 PREDIABETES: ICD-10-CM

## 2024-04-15 DIAGNOSIS — Z13.6 SCREENING FOR CARDIOVASCULAR CONDITION: ICD-10-CM

## 2024-04-15 LAB
ALBUMIN SERPL BCP-MCNC: 3.9 G/DL (ref 3.5–5)
ALP SERPL-CCNC: 82 U/L (ref 34–104)
ALT SERPL W P-5'-P-CCNC: 29 U/L (ref 7–52)
ANION GAP SERPL CALCULATED.3IONS-SCNC: 10 MMOL/L (ref 4–13)
AST SERPL W P-5'-P-CCNC: 26 U/L (ref 13–39)
BILIRUB SERPL-MCNC: 0.61 MG/DL (ref 0.2–1)
BUN SERPL-MCNC: 17 MG/DL (ref 5–25)
CALCIUM SERPL-MCNC: 8.8 MG/DL (ref 8.4–10.2)
CHLORIDE SERPL-SCNC: 102 MMOL/L (ref 96–108)
CHOLEST SERPL-MCNC: 188 MG/DL
CO2 SERPL-SCNC: 29 MMOL/L (ref 21–32)
CREAT SERPL-MCNC: 0.67 MG/DL (ref 0.6–1.3)
GFR SERPL CREATININE-BSD FRML MDRD: 80 ML/MIN/1.73SQ M
GLUCOSE P FAST SERPL-MCNC: 82 MG/DL (ref 65–99)
HDLC SERPL-MCNC: 83 MG/DL
LDLC SERPL CALC-MCNC: 86 MG/DL (ref 0–100)
POTASSIUM SERPL-SCNC: 4 MMOL/L (ref 3.5–5.3)
PROT SERPL-MCNC: 7 G/DL (ref 6.4–8.4)
SODIUM SERPL-SCNC: 141 MMOL/L (ref 135–147)
TRIGL SERPL-MCNC: 96 MG/DL

## 2024-04-15 PROCEDURE — 36415 COLL VENOUS BLD VENIPUNCTURE: CPT

## 2024-04-15 PROCEDURE — 80053 COMPREHEN METABOLIC PANEL: CPT

## 2024-04-15 PROCEDURE — 80061 LIPID PANEL: CPT

## 2024-04-29 ENCOUNTER — RA CDI HCC (OUTPATIENT)
Dept: OTHER | Facility: HOSPITAL | Age: 84
End: 2024-04-29

## 2024-05-06 ENCOUNTER — OFFICE VISIT (OUTPATIENT)
Dept: INTERNAL MEDICINE CLINIC | Facility: CLINIC | Age: 84
End: 2024-05-06
Payer: COMMERCIAL

## 2024-05-06 VITALS
SYSTOLIC BLOOD PRESSURE: 128 MMHG | OXYGEN SATURATION: 90 % | HEART RATE: 80 BPM | WEIGHT: 127 LBS | DIASTOLIC BLOOD PRESSURE: 80 MMHG | BODY MASS INDEX: 27.4 KG/M2 | HEIGHT: 57 IN

## 2024-05-06 DIAGNOSIS — N39.3 FEMALE STRESS INCONTINENCE: ICD-10-CM

## 2024-05-06 DIAGNOSIS — Z13.6 SCREENING FOR CARDIOVASCULAR CONDITION: ICD-10-CM

## 2024-05-06 DIAGNOSIS — M81.0 OSTEOPOROSIS, UNSPECIFIED OSTEOPOROSIS TYPE, UNSPECIFIED PATHOLOGICAL FRACTURE PRESENCE: ICD-10-CM

## 2024-05-06 DIAGNOSIS — Z00.00 MEDICARE ANNUAL WELLNESS VISIT, SUBSEQUENT: Primary | ICD-10-CM

## 2024-05-06 DIAGNOSIS — E66.3 OVERWEIGHT (BMI 25.0-29.9): ICD-10-CM

## 2024-05-06 PROCEDURE — G0439 PPPS, SUBSEQ VISIT: HCPCS | Performed by: INTERNAL MEDICINE

## 2024-05-06 PROCEDURE — 99213 OFFICE O/P EST LOW 20 MIN: CPT | Performed by: INTERNAL MEDICINE

## 2024-05-06 NOTE — PATIENT INSTRUCTIONS
Medicare Preventive Visit Patient Instructions  Thank you for completing your Welcome to Medicare Visit or Medicare Annual Wellness Visit today. Your next wellness visit will be due in one year (5/7/2025).  The screening/preventive services that you may require over the next 5-10 years are detailed below. Some tests may not apply to you based off risk factors and/or age. Screening tests ordered at today's visit but not completed yet may show as past due. Also, please note that scanned in results may not display below.  Preventive Screenings:  Service Recommendations Previous Testing/Comments   Colorectal Cancer Screening  * Colonoscopy    * Fecal Occult Blood Test (FOBT)/Fecal Immunochemical Test (FIT)  * Fecal DNA/Cologuard Test  * Flexible Sigmoidoscopy Age: 45-75 years old   Colonoscopy: every 10 years (may be performed more frequently if at higher risk)  OR  FOBT/FIT: every 1 year  OR  Cologuard: every 3 years  OR  Sigmoidoscopy: every 5 years  Screening may be recommended earlier than age 45 if at higher risk for colorectal cancer. Also, an individualized decision between you and your healthcare provider will decide whether screening between the ages of 76-85 would be appropriate. Colonoscopy: 05/13/2022  FOBT/FIT: Not on file  Cologuard: Not on file  Sigmoidoscopy: Not on file    Screening Not Indicated     Breast Cancer Screening Age: 40+ years old  Frequency: every 1-2 years  Not required if history of left and right mastectomy Mammogram: 12/18/2023    Screening Current   Cervical Cancer Screening Between the ages of 21-29, pap smear recommended once every 3 years.   Between the ages of 30-65, can perform pap smear with HPV co-testing every 5 years.   Recommendations may differ for women with a history of total hysterectomy, cervical cancer, or abnormal pap smears in past. Pap Smear: 04/16/2019    Screening Not Indicated   Hepatitis C Screening Once for adults born between 1945 and 1965  More frequently in  patients at high risk for Hepatitis C Hep C Antibody: Not on file        Diabetes Screening 1-2 times per year if you're at risk for diabetes or have pre-diabetes Fasting glucose: 82 mg/dL (4/15/2024)  A1C: 5.8 % (9/29/2022)  Screening Current   Cholesterol Screening Once every 5 years if you don't have a lipid disorder. May order more often based on risk factors. Lipid panel: 04/15/2024    Screening Current     Other Preventive Screenings Covered by Medicare:  Abdominal Aortic Aneurysm (AAA) Screening: covered once if your at risk. You're considered to be at risk if you have a family history of AAA.  Lung Cancer Screening: covers low dose CT scan once per year if you meet all of the following conditions: (1) Age 55-77; (2) No signs or symptoms of lung cancer; (3) Current smoker or have quit smoking within the last 15 years; (4) You have a tobacco smoking history of at least 20 pack years (packs per day multiplied by number of years you smoked); (5) You get a written order from a healthcare provider.  Glaucoma Screening: covered annually if you're considered high risk: (1) You have diabetes OR (2) Family history of glaucoma OR (3)  aged 50 and older OR (4)  American aged 65 and older  Osteoporosis Screening: covered every 2 years if you meet one of the following conditions: (1) You're estrogen deficient and at risk for osteoporosis based off medical history and other findings; (2) Have a vertebral abnormality; (3) On glucocorticoid therapy for more than 3 months; (4) Have primary hyperparathyroidism; (5) On osteoporosis medications and need to assess response to drug therapy.   Last bone density test (DXA Scan): 03/08/2023.  HIV Screening: covered annually if you're between the age of 15-65. Also covered annually if you are younger than 15 and older than 65 with risk factors for HIV infection. For pregnant patients, it is covered up to 3 times per pregnancy.    Immunizations:  Immunization  Recommendations   Influenza Vaccine Annual influenza vaccination during flu season is recommended for all persons aged >= 6 months who do not have contraindications   Pneumococcal Vaccine   * Pneumococcal conjugate vaccine = PCV13 (Prevnar 13), PCV15 (Vaxneuvance), PCV20 (Prevnar 20)  * Pneumococcal polysaccharide vaccine = PPSV23 (Pneumovax) Adults 19-65 yo with certain risk factors or if 65+ yo  If never received any pneumonia vaccine: recommend Prevnar 20 (PCV20)  Give PCV20 if previously received 1 dose of PCV13 or PPSV23   Hepatitis B Vaccine 3 dose series if at intermediate or high risk (ex: diabetes, end stage renal disease, liver disease)   Respiratory syncytial virus (RSV) Vaccine - COVERED BY MEDICARE PART D  * RSVPreF3 (Arexvy) CDC recommends that adults 60 years of age and older may receive a single dose of RSV vaccine using shared clinical decision-making (SCDM)   Tetanus (Td) Vaccine - COST NOT COVERED BY MEDICARE PART B Following completion of primary series, a booster dose should be given every 10 years to maintain immunity against tetanus. Td may also be given as tetanus wound prophylaxis.   Tdap Vaccine - COST NOT COVERED BY MEDICARE PART B Recommended at least once for all adults. For pregnant patients, recommended with each pregnancy.   Shingles Vaccine (Shingrix) - COST NOT COVERED BY MEDICARE PART B  2 shot series recommended in those 19 years and older who have or will have weakened immune systems or those 50 years and older     Health Maintenance Due:      Topic Date Due   • DXA SCAN  03/08/2025     Immunizations Due:      Topic Date Due   • COVID-19 Vaccine (4 - 2023-24 season) 09/01/2023     Advance Directives   What are advance directives?  Advance directives are legal documents that state your wishes and plans for medical care. These plans are made ahead of time in case you lose your ability to make decisions for yourself. Advance directives can apply to any medical decision, such as the  treatments you want, and if you want to donate organs.   What are the types of advance directives?  There are many types of advance directives, and each state has rules about how to use them. You may choose a combination of any of the following:  Living will:  This is a written record of the treatment you want. You can also choose which treatments you do not want, which to limit, and which to stop at a certain time. This includes surgery, medicine, IV fluid, and tube feedings.   Durable power of  for healthcare (DPAHC):  This is a written record that states who you want to make healthcare choices for you when you are unable to make them for yourself. This person, called a proxy, is usually a family member or a friend. You may choose more than 1 proxy.  Do not resuscitate (DNR) order:  A DNR order is used in case your heart stops beating or you stop breathing. It is a request not to have certain forms of treatment, such as CPR. A DNR order may be included in other types of advance directives.  Medical directive:  This covers the care that you want if you are in a coma, near death, or unable to make decisions for yourself. You can list the treatments you want for each condition. Treatment may include pain medicine, surgery, blood transfusions, dialysis, IV or tube feedings, and a ventilator (breathing machine).  Values history:  This document has questions about your views, beliefs, and how you feel and think about life. This information can help others choose the care that you would choose.  Why are advance directives important?  An advance directive helps you control your care. Although spoken wishes may be used, it is better to have your wishes written down. Spoken wishes can be misunderstood, or not followed. Treatments may be given even if you do not want them. An advance directive may make it easier for your family to make difficult choices about your care.   Urinary Incontinence   Urinary incontinence  (UI)  is when you lose control of your bladder. UI develops because your bladder cannot store or empty urine properly. The 3 most common types of UI are stress incontinence, urge incontinence, or both.  Medicines:   May be given to help strengthen your bladder control. Report any side effects of medication to your healthcare provider.  Do pelvic muscle exercises often:  Your pelvic muscles help you stop urinating. Squeeze these muscles tight for 5 seconds, then relax for 5 seconds. Gradually work up to squeezing for 10 seconds. Do 3 sets of 15 repetitions a day, or as directed. This will help strengthen your pelvic muscles and improve bladder control.  Train your bladder:  Go to the bathroom at set times, such as every 2 hours, even if you do not feel the urge to go. You can also try to hold your urine when you feel the urge to go. For example, hold your urine for 5 minutes when you feel the urge to go. As that becomes easier, hold your urine for 10 minutes.   Self-care:   Keep a UI record.  Write down how often you leak urine and how much you leak. Make a note of what you were doing when you leaked urine.  Drink liquids as directed. You may need to limit the amount of liquid you drink to help control your urine leakage. Do not drink any liquid right before you go to bed. Limit or do not have drinks that contain caffeine or alcohol.   Prevent constipation.  Eat a variety of high-fiber foods. Good examples are high-fiber cereals, beans, vegetables, and whole-grain breads. Walking is the best way to trigger your intestines to have a bowel movement.  Exercise regularly and maintain a healthy weight.  Weight loss and exercise will decrease pressure on your bladder and help you control your leakage.   Use a catheter as directed  to help empty your bladder. A catheter is a tiny, plastic tube that is put into your bladder to drain your urine.   Go to behavior therapy as directed.  Behavior therapy may be used to help you  learn to control your urge to urinate.    Weight Management   Why it is important to manage your weight:  Being overweight increases your risk of health conditions such as heart disease, high blood pressure, type 2 diabetes, and certain types of cancer. It can also increase your risk for osteoarthritis, sleep apnea, and other respiratory problems. Aim for a slow, steady weight loss. Even a small amount of weight loss can lower your risk of health problems.  How to lose weight safely:  A safe and healthy way to lose weight is to eat fewer calories and get regular exercise. You can lose up about 1 pound a week by decreasing the number of calories you eat by 500 calories each day.   Healthy meal plan for weight management:  A healthy meal plan includes a variety of foods, contains fewer calories, and helps you stay healthy. A healthy meal plan includes the following:  Eat whole-grain foods more often.  A healthy meal plan should contain fiber. Fiber is the part of grains, fruits, and vegetables that is not broken down by your body. Whole-grain foods are healthy and provide extra fiber in your diet. Some examples of whole-grain foods are whole-wheat breads and pastas, oatmeal, brown rice, and bulgur.  Eat a variety of vegetables every day.  Include dark, leafy greens such as spinach, kale, cassandra greens, and mustard greens. Eat yellow and orange vegetables such as carrots, sweet potatoes, and winter squash.   Eat a variety of fruits every day.  Choose fresh or canned fruit (canned in its own juice or light syrup) instead of juice. Fruit juice has very little or no fiber.  Eat low-fat dairy foods.  Drink fat-free (skim) milk or 1% milk. Eat fat-free yogurt and low-fat cottage cheese. Try low-fat cheeses such as mozzarella and other reduced-fat cheeses.  Choose meat and other protein foods that are low in fat.  Choose beans or other legumes such as split peas or lentils. Choose fish, skinless poultry (chicken or turkey),  or lean cuts of red meat (beef or pork). Before you cook meat or poultry, cut off any visible fat.   Use less fat and oil.  Try baking foods instead of frying them. Add less fat, such as margarine, sour cream, regular salad dressing and mayonnaise to foods. Eat fewer high-fat foods. Some examples of high-fat foods include french fries, doughnuts, ice cream, and cakes.  Eat fewer sweets.  Limit foods and drinks that are high in sugar. This includes candy, cookies, regular soda, and sweetened drinks.  Exercise:  Exercise at least 30 minutes per day on most days of the week. Some examples of exercise include walking, biking, dancing, and swimming. You can also fit in more physical activity by taking the stairs instead of the elevator or parking farther away from stores. Ask your healthcare provider about the best exercise plan for you.      © Copyright [employer] 2018 Information is for End User's use only and may not be sold, redistributed or otherwise used for commercial purposes. All illustrations and images included in CareNotes® are the copyrighted property of [employer], [employer] or [employer]      Kegel Exercises for Women   AMBULATORY CARE:   Kegel exercises  help strengthen your pelvic muscles. Pelvic muscles hold your pelvic organs, such as your bladder and uterus, in place. Kegel exercises help prevent or control certain conditions, such as urine incontinence (leakage) or uterine prolapse.       Call your doctor or physical therapist if:   You cannot feel your muscles tighten or relax.    You continue to leak urine.    You have questions or concerns about your condition or care.    Use the correct muscles:  Pelvic muscles are the muscles you use to control urine flow. To target these muscles, stop and start the flow of urine several times. This will help you become familiar with how it feels to tighten and relax these muscles.  How to do Kegel exercises:   Get into a comfortable position.  You may lie  down, stand up, or sit down to do these exercises. When you first try to do these exercises, it may be easier if you lie down.    Tighten or squeeze your pelvic muscles slowly.  It may feel like you are trying to hold back urine or gas. Hold this position for 3 seconds. Relax for 3 seconds. Repeat this cycle 10 times. Do not hold your breath when you do Kegel exercises. Keep your stomach, back, and leg muscles relaxed.    Do 10 sets of Kegel exercises, at least 3 times a day.  When you know how to do Kegel exercises, use different positions. This will help to strengthen your pelvic muscles as much as possible. You can do these exercises while you lie on the floor, watch TV, or while you stand. Tighten your pelvic muscles before you sneeze, cough, or lift to prevent urine leakage. You may notice improved bladder control within about 6 weeks.    Follow up with your doctor or physical therapist as directed:  Write down your questions so you remember to ask them during your visits.  © Copyright [employer] 2023 Information is for End User's use only and may not be sold, redistributed or otherwise used for commercial purposes.  The above information is an  only. It is not intended as medical advice for individual conditions or treatments. Talk to your doctor, nurse or pharmacist before following any medical regimen to see if it is safe and effective for you.

## 2024-05-06 NOTE — PROGRESS NOTES
Assessment and Plan:     Problem List Items Addressed This Visit        Musculoskeletal and Integument    Osteoporosis     Reviewed DEXA scan in detail with the patient patient is currently on calcium and vitamin D, weightbearing exercises I will have her see dentist Dr. Arizmendi for dental clearance for starting Prolia patient will let us know when this is completed            Urinary    Female stress incontinence     Recommend Kegel's exercises she has seen uro-GYN in the past symptoms are relatively mild            Other    Medicare annual wellness visit, subsequent - Primary     Assessment and plan 1.  Medicare subsequent annual wellness examination overall the patient is clinically stable and doing well, we encouraged the patient to follow a healthy and balanced diet.  We recommend that the patient exercise routinely approximately 30 minutes 5 times per week .  We have reviewed the patient's vaccines and have made recommendations for updates if necessary annual flu shot     .  We will be ordering screening laboratories which are age appropriate.  Return to the office in   6 months   call if any problems.         Overweight (BMI 25.0-29.9)     I have counselled the pt to follow a healthy and balanced diet ,and recommend routine exercise.        Other Visit Diagnoses     Screening for cardiovascular condition        Relevant Orders    Comprehensive metabolic panel    Lipid Panel with Direct LDL reflex        RTO in 3 months call if any problems    Depression Screening and Follow-up Plan: Patient was screened for depression during today's encounter. They screened negative with a PHQ-2 score of 0.    Urinary Incontinence Plan of Care: counseling topics discussed: practice Kegel (pelvic floor strengthening) exercises.       Preventive health issues were discussed with patient, and age appropriate screening tests were ordered as noted in patient's After Visit Summary.  Personalized health advice and appropriate  "referrals for health education or preventive services given if needed, as noted in patient's After Visit Summary.     History of Present Illness:     Patient presents for a Medicare Wellness Visit    HPI 83-year old female coming in for a follow up office visit regarding stress incontinence, osteoporosis, overweight; the patient reports me compliant taking medications without untoward side effects the.  The patient is here to review his medical condition, update me on the medical condition and the patient reports me no hospitalizations and no ER visits.  No injuries no illnesses try to follow-up and balanced diet remains active she was recently gone for DEXA scan showing osteoporosis she has not seen dentist recently?  If having a dental problem she would like to have this evaluated prior to starting Prolia.  not exercising , s/p tkr 2 yrs not taking prolia \"dosent like to take med\"  Patient Care Team:  Boubacar Lee DO as PCP - General  Courtney Clark DO     Review of Systems:     Review of Systems   Constitutional:  Negative for activity change, appetite change and unexpected weight change.   HENT:  Negative for congestion and postnasal drip.    Eyes:  Negative for visual disturbance.   Respiratory:  Negative for cough and shortness of breath.    Cardiovascular:  Negative for chest pain.   Gastrointestinal:  Negative for abdominal pain, diarrhea, nausea and vomiting.   Neurological:  Negative for dizziness, light-headedness and headaches.   Hematological:  Negative for adenopathy.        Problem List:     Patient Active Problem List   Diagnosis   • Osteopenia   • Benign positional vertigo, left   • Encounter for screening mammogram for breast cancer   • Absence of bladder continence   • Venous insufficiency   • Skin lesion   • Sciatica of right side   • After cataract, right eye   • Iron deficiency anemia   • Functional bowel disorder   • Hyperthyroidism   • Vitamin D deficiency   • Contact dermatitis   • " Medicare annual wellness visit, subsequent   • Female stress incontinence   • Osteoporosis   • Overweight (BMI 25.0-29.9)      Past Medical and Surgical History:     Past Medical History:   Diagnosis Date   • Arthritis    • Cancer (HCC)    • Colon cancer (HCC)     age 52   • Colon polyp    • GI malignancy (HCC) 2022     Past Surgical History:   Procedure Laterality Date   •  SECTION      43 years ago   • COLON SURGERY      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   • JOINT REPLACEMENT  2022    R TKA   • KNEE SURGERY     • TOTAL ABDOMINAL HYSTERECTOMY Bilateral     age 43   • TOTAL ABDOMINAL HYSTERECTOMY W/ BILATERAL SALPINGOOPHORECTOMY Bilateral     fibroids; age 43      Family History:     Family History   Problem Relation Age of Onset   • Thyroid disease Mother    • Arthritis Father    • Cancer Family    • Lung cancer Sister 83   • Cancer Sister         Passed  2013 as result of lung cancer   • Colon cancer Sister 70   • Breast cancer Sister 60        Diagnosed in situ but had one breast removed over 20 yrs ago; still living  very healthy at 89 yrs.   • No Known Problems Daughter    • No Known Problems Maternal Aunt    • No Known Problems Maternal Grandmother    • No Known Problems Maternal Grandfather    • No Known Problems Paternal Grandmother    • No Known Problems Paternal Grandfather    • Dementia Brother    • Hearing loss Brother       Social History:     Social History     Socioeconomic History   • Marital status:      Spouse name: None   • Number of children: None   • Years of education: None   • Highest education level: None   Occupational History   • None   Tobacco Use   • Smoking status: Former     Current packs/day: 0.00     Average packs/day: 0.3 packs/day for 26.6 years (9.1 ttl pk-yrs)     Types: Cigarettes     Start date:      Quit date:      Years since quittin.3   • Smokeless tobacco: Never   • Tobacco comments:     Was not heavy smoker    Vaping Use   • Vaping status: Never Used   Substance and Sexual Activity   • Alcohol use: No   • Drug use: No   • Sexual activity: Not Currently     Partners: Male   Other Topics Concern   • None   Social History Narrative   • None     Social Determinants of Health     Financial Resource Strain: Low Risk  (4/18/2023)    Overall Financial Resource Strain (CARDIA)    • Difficulty of Paying Living Expenses: Not hard at all   Food Insecurity: No Food Insecurity (5/6/2024)    Hunger Vital Sign    • Worried About Running Out of Food in the Last Year: Never true    • Ran Out of Food in the Last Year: Never true   Transportation Needs: No Transportation Needs (5/6/2024)    PRAPARE - Transportation    • Lack of Transportation (Medical): No    • Lack of Transportation (Non-Medical): No   Physical Activity: Not on file   Stress: Not on file   Social Connections: Not on file   Intimate Partner Violence: Not on file   Housing Stability: Low Risk  (5/6/2024)    Housing Stability Vital Sign    • Unable to Pay for Housing in the Last Year: No    • Number of Places Lived in the Last Year: 1    • Unstable Housing in the Last Year: No      Medications and Allergies:     Current Outpatient Medications   Medication Sig Dispense Refill   • Biotin 1000 MCG tablet Take 1,000 mcg by mouth daily     • Calcium 500-125 MG-UNIT TABS Take 1 tablet by mouth daily     • Carboxymethylcellulose Sodium (RETAINE CMC OP) Apply to eye     • Cholecalciferol (VITAMIN D3) 1000 units CAPS Take by mouth     • cyanocobalamin (VITAMIN B-12) 500 MCG tablet Take 500 mcg by mouth daily     • magnesium gluconate (MAGONATE) 500 mg tablet Take 500 mg by mouth 2 (two) times a day     • Multiple Vitamin (MULTIVITAMIN) tablet Take 1 tablet by mouth daily     • Omega-3 Fatty Acids (OMEGA-3 FISH OIL) 1200 MG CAPS Take by mouth     • Probiotic Product (PROBIOTIC-10) CAPS Take by mouth     • RESTASIS 0.05 % ophthalmic emulsion      • zinc gluconate 50 mg tablet Take 50  mg by mouth daily       No current facility-administered medications for this visit.     No Known Allergies   Immunizations:     Immunization History   Administered Date(s) Administered   • COVID-19 MODERNA VACC 0.25 ML IM BOOSTER 01/04/2022   • COVID-19 PFIZER VACCINE 0.3 ML IM 02/13/2021, 03/04/2021   • INFLUENZA 10/06/2022   • Influenza Split High Dose Preservative Free IM 10/08/2016, 09/22/2017   • Influenza, high dose seasonal 0.7 mL 09/10/2019, 08/28/2020, 10/06/2022, 10/13/2023   • Pneumococcal Conjugate 13-Valent 05/06/2019   • Pneumococcal Polysaccharide PPV23 07/23/2010   • TD (adult) Preservative Free 07/23/2013   • Zoster 04/24/2014   • Zoster Vaccine Recombinant 05/04/2021, 09/15/2022      Health Maintenance:         Topic Date Due   • DXA SCAN  03/08/2025         Topic Date Due   • COVID-19 Vaccine (4 - 2023-24 season) 09/01/2023      Medicare Screening Tests and Risk Assessments:     Megan is here for her Subsequent Wellness visit.     Health Risk Assessment:   Patient rates overall health as very good. Patient feels that their physical health rating is slightly better. Patient is very satisfied with their life. Eyesight was rated as same. Hearing was rated as same. Patient feels that their emotional and mental health rating is same. Patients states they are never, rarely angry. Patient states they are never, rarely unusually tired/fatigued. Pain experienced in the last 7 days has been none. Patient states that she has experienced no weight loss or gain in last 6 months.     Depression Screening:   PHQ-2 Score: 0      Fall Risk Screening:   In the past year, patient has experienced: no history of falling in past year      Urinary Incontinence Screening:   Patient has leaked urine accidently in the last six months. Only once from coughing    Home Safety:  Patient does not have trouble with stairs inside or outside of their home. Patient has working smoke alarms and has no working carbon monoxide  detector. Home safety hazards include: none. Have carbon monoxide monitor; it's not installed yet.    Nutrition:   Current diet is Regular, Low Saturated Fat, Low Carb and No Added Salt. Recent vacation went off regular diet.  Will go on regular foods again    Medications:   Patient is currently taking over-the-counter supplements. OTC medications include: see medication list. Patient is able to manage medications. Will provide current list of supplements    Activities of Daily Living (ADLs)/Instrumental Activities of Daily Living (IADLs):   Walk and transfer into and out of bed and chair?: Yes  Dress and groom yourself?: Yes    Bathe or shower yourself?: Yes    Feed yourself? Yes  Do your laundry/housekeeping?: Yes  Manage your money, pay your bills and track your expenses?: Yes  Make your own meals?: Yes    Do your own shopping?: Yes    Previous Hospitalizations:   Any hospitalizations or ED visits within the last 12 months?: No      Hospitalization Comments: Expect to be working on Advanced Care & Living Will & Pof Atty forms               A Atty    Advance Care Planning:   Living will: No    Durable POA for healthcare: No    Advanced directive: No    Advanced directive counseling given: Yes    End of Life Decisions reviewed with patient: Yes      PREVENTIVE SCREENINGS      Cardiovascular Screening:    General: Screening Current      Diabetes Screening:     General: Screening Current      Colorectal Cancer Screening:     General: Screening Not Indicated      Breast Cancer Screening:     General: Screening Current      Cervical Cancer Screening:    General: Screening Not Indicated      Osteoporosis Screening:    General: Screening Not Indicated and History Osteoporosis      Lung Cancer Screening:     General: Screening Not Indicated    Screening, Brief Intervention, and Referral to Treatment (SBIRT)    Screening  Typical number of drinks in a day: 0  Typical number of drinks in a week: 0  Interpretation: Low risk  "drinking behavior.    AUDIT-C Screenin) How often did you have a drink containing alcohol in the past year? never  2) How many drinks did you have on a typical day when you were drinking in the past year? 0  3) How often did you have 6 or more drinks on one occasion in the past year? never    AUDIT-C Score: 0  Interpretation: Score 0-2 (female): Negative screen for alcohol misuse    Single Item Drug Screening:  How often have you used an illegal drug (including marijuana) or a prescription medication for non-medical reasons in the past year? never    Single Item Drug Screen Score: 0  Interpretation: Negative screen for possible drug use disorder    No results found.     Physical Exam:     /80   Pulse 80   Ht 4' 9\" (1.448 m)   Wt 57.6 kg (127 lb)   SpO2 90%   BMI 27.48 kg/m²     Physical Exam  Vitals and nursing note reviewed.   Constitutional:       General: She is not in acute distress.     Appearance: Normal appearance. She is well-developed. She is not ill-appearing, toxic-appearing or diaphoretic.   HENT:      Head: Normocephalic and atraumatic.      Right Ear: External ear normal.      Left Ear: External ear normal.      Nose: Nose normal.   Eyes:      Pupils: Pupils are equal, round, and reactive to light.   Cardiovascular:      Rate and Rhythm: Normal rate and regular rhythm.      Heart sounds: Normal heart sounds. No murmur heard.  Pulmonary:      Effort: Pulmonary effort is normal.      Breath sounds: Normal breath sounds.   Abdominal:      General: There is no distension.      Palpations: Abdomen is soft.      Tenderness: There is no abdominal tenderness. There is no guarding.   Neurological:      Mental Status: She is alert.          Boubacar Lee DO  "

## 2024-05-07 ENCOUNTER — TELEPHONE (OUTPATIENT)
Age: 84
End: 2024-05-07

## 2024-05-07 NOTE — ASSESSMENT & PLAN NOTE
Assessment and plan 1.  Medicare subsequent annual wellness examination overall the patient is clinically stable and doing well, we encouraged the patient to follow a healthy and balanced diet.  We recommend that the patient exercise routinely approximately 30 minutes 5 times per week .  We have reviewed the patient's vaccines and have made recommendations for updates if necessary annual flu shot     .  We will be ordering screening laboratories which are age appropriate.  Return to the office in   6 months   call if any problems.

## 2024-05-07 NOTE — TELEPHONE ENCOUNTER
Patient would like a call back with the correct spelling of the dentist that she was referred to.  She states she found a Dr Roche on [address] [facility], [place] [address]      Please adivse

## 2024-05-07 NOTE — ASSESSMENT & PLAN NOTE
Reviewed DEXA scan in detail with the patient patient is currently on calcium and vitamin D, weightbearing exercises I will have her see dentist Dr. Arizmendi for dental clearance for starting Prolia patient will let us know when this is completed

## 2024-05-07 NOTE — TELEPHONE ENCOUNTER
Megan states she has noticed she's been experiencing leg cramping on her inner left thigh. She states she will be sitting at times and she can feel the cramp coming on, and if she stand up it will help. She is looking for a recommendation from Dr. Lee as to what she can take/ drink or stretches that she can do.    She states we can leave a detailed message if she doesn't answer with Dr. Lee's advice.     Thank you

## 2024-05-31 ENCOUNTER — TELEPHONE (OUTPATIENT)
Age: 84
End: 2024-05-31

## 2024-05-31 NOTE — TELEPHONE ENCOUNTER
Pt called and she got another letter, she dropped one off a month ago,  that she gets these additional benefits if you have certain conditions and hers was prediabetic and she received another letter today that she will be disenrolled unless the dr fills out the form saying she has this condition and if she doesn't she good with not receiving the benefits anymore. Pt will bring letter by Monday for dr to fill out

## 2024-06-05 PROBLEM — Z00.00 MEDICARE ANNUAL WELLNESS VISIT, SUBSEQUENT: Status: RESOLVED | Noted: 2024-05-06 | Resolved: 2024-06-05

## 2024-06-12 ENCOUNTER — APPOINTMENT (OUTPATIENT)
Dept: LAB | Facility: CLINIC | Age: 84
End: 2024-06-12
Payer: COMMERCIAL

## 2024-06-12 DIAGNOSIS — E05.90 HYPERTHYROIDISM: Primary | ICD-10-CM

## 2024-06-12 LAB
T3 SERPL-MCNC: 0.9 NG/ML
T4 FREE SERPL-MCNC: 1.01 NG/DL (ref 0.61–1.12)
TSH SERPL DL<=0.05 MIU/L-ACNC: 0.64 UIU/ML (ref 0.45–4.5)

## 2024-06-12 PROCEDURE — 84443 ASSAY THYROID STIM HORMONE: CPT

## 2024-06-13 ENCOUNTER — OFFICE VISIT (OUTPATIENT)
Dept: ENDOCRINOLOGY | Facility: CLINIC | Age: 84
End: 2024-06-13
Payer: COMMERCIAL

## 2024-06-13 VITALS
WEIGHT: 126 LBS | RESPIRATION RATE: 14 BRPM | TEMPERATURE: 97.5 F | OXYGEN SATURATION: 98 % | BODY MASS INDEX: 27.18 KG/M2 | DIASTOLIC BLOOD PRESSURE: 86 MMHG | SYSTOLIC BLOOD PRESSURE: 138 MMHG | HEIGHT: 57 IN | HEART RATE: 63 BPM

## 2024-06-13 DIAGNOSIS — E55.9 VITAMIN D DEFICIENCY: ICD-10-CM

## 2024-06-13 DIAGNOSIS — M81.0 OSTEOPOROSIS, UNSPECIFIED OSTEOPOROSIS TYPE, UNSPECIFIED PATHOLOGICAL FRACTURE PRESENCE: ICD-10-CM

## 2024-06-13 DIAGNOSIS — E05.90 HYPERTHYROIDISM: Primary | ICD-10-CM

## 2024-06-13 PROBLEM — M85.80 OSTEOPENIA: Status: RESOLVED | Noted: 2018-11-04 | Resolved: 2024-06-13

## 2024-06-13 PROCEDURE — G2211 COMPLEX E/M VISIT ADD ON: HCPCS | Performed by: INTERNAL MEDICINE

## 2024-06-13 PROCEDURE — 99214 OFFICE O/P EST MOD 30 MIN: CPT | Performed by: INTERNAL MEDICINE

## 2024-06-13 NOTE — PROGRESS NOTES
"    Follow-up Patient Progress Note      CC: Graves disease     History of Present Illness:   82-year-old female with osteoporosis, vitamin D deficiency, DJD, diabetes, colonic polyps and recently diagnosed mild hyperthyroidism 2\" Graves disease c +ve TSI. Last visit was 12/13/23.     Since last visit, weight is same.  She is essentially asymptomatic.    She stopped methimazole 5/18/23.     No History of external radiation, recent Iodine loading or radiological diagnostic studies.   No difficulty with swallowing or breathing or change in voice.       1/30/22: NM uptake thyroid - normal.    3/8/2023 DEXA: T-scores of -2.0 LS, -2.0 LTH, -2.4 LFN, -2.8 right forearm.  Consistent with osteoporosis.  10-year FRAX score of 3.7% hip and 11% major osteoporotic fracture.     Family Hx of thyroid:nephew - graves. Mother - hypothyroidism. Sister - hypothyroidism.    Physical Exam:  Body mass index is 27.27 kg/m².  /86 (BP Location: Left arm, Patient Position: Sitting)   Pulse 63   Temp 97.5 °F (36.4 °C) (Tympanic)   Resp 14   Ht 4' 9\" (1.448 m)   Wt 57.2 kg (126 lb) Comment: patient reported, patient refused scale  SpO2 98%   BMI 27.27 kg/m²    Vitals:    06/13/24 1102   Weight: 57.2 kg (126 lb)        Physical Exam  Constitutional:       General: She is not in acute distress.     Appearance: She is well-developed.   HENT:      Head: Normocephalic and atraumatic.      Nose: Nose normal.   Eyes:      Conjunctiva/sclera: Conjunctivae normal.   Pulmonary:      Effort: Pulmonary effort is normal.   Abdominal:      General: There is no distension.   Musculoskeletal:      Cervical back: Normal range of motion and neck supple.   Skin:     Findings: No rash.      Comments: No icterus   Neurological:      Mental Status: She is alert and oriented to person, place, and time.         Labs:   Lab Results   Component Value Date    HGBA1C 5.8 (H) 09/29/2022       Lab Results   Component Value Date    BDI6XFMCXJCL 0.636 " 06/12/2024    P5ONDKW 0.9 06/12/2024    E1DLPZI 9.21 06/20/2023       Lab Results   Component Value Date    CREATININE 0.67 04/15/2024    CREATININE 0.54 (L) 09/29/2022    CREATININE 0.52 06/17/2022    BUN 17 04/15/2024     07/24/2015    K 4.0 04/15/2024     04/15/2024    CO2 29 04/15/2024     eGFRcr   Date Value Ref Range Status   06/17/2022 92 >59 Final     eGFR   Date Value Ref Range Status   04/15/2024 80 ml/min/1.73sq m Final       Lab Results   Component Value Date    ALT 29 04/15/2024    AST 26 04/15/2024    GGT 78 11/03/2022    ALKPHOS 82 04/15/2024    BILITOT 0.56 07/24/2015       Lab Results   Component Value Date    CHOLESTEROL 188 04/15/2024    CHOLESTEROL 156 09/29/2022    CHOLESTEROL 192 11/01/2021     Lab Results   Component Value Date    HDL 83 04/15/2024    HDL 80 09/29/2022    HDL 92 11/01/2021     Lab Results   Component Value Date    TRIG 96 04/15/2024    TRIG 92 09/29/2022    TRIG 69 11/01/2021     Lab Results   Component Value Date    NONHDLC 91 08/06/2018         Assessment/Plan:    1. Hyperthyroidism  Assessment & Plan:  She remains euthyroid clinically and biochemically.  We agreed to watch without methimazole.    Follow up in 6-12 months as needed either with myself or PCP.    2. Osteoporosis, unspecified osteoporosis type, unspecified pathological fracture presence  Assessment & Plan:  She is able to do enough diet/exercise, calcium and vitamin D.    She is not yet able to decide based on reported side effects for reclast and prolia.  We had discussed last 2 visits - the same.    I reiterated advise to consider reclast as first option vs Prolia as second option.    Follow up in 1 year either with me or Dr. Lee.  Orders:  -     DXA bone density spine hip and pelvis; Future; Expected date: 06/05/2025  3. Vitamin D deficiency        I have spent a total time of 31 minutes on 06/13/24 in caring for this patient including greater than 50% of this time was spent in  counseling/coordination of care as listed above.       Discussed with the patient and all questioned fully answered. She will contact me with concerns.    Gerry Yip

## 2024-06-13 NOTE — ASSESSMENT & PLAN NOTE
She is able to do enough diet/exercise, calcium and vitamin D.    She is not yet able to decide based on reported side effects for reclast and prolia.  We had discussed last 2 visits - the same.    I reiterated advise to consider reclast as first option vs Prolia as second option.    Follow up in 1 year either with me or Dr. Lee.

## 2024-06-13 NOTE — ASSESSMENT & PLAN NOTE
She remains euthyroid clinically and biochemically.  We agreed to watch without methimazole.    Follow up in 6-12 months as needed either with myself or PCP.

## 2024-06-13 NOTE — PATIENT INSTRUCTIONS
Zoledronic Acid (By injection)   Zoledronic Acid (rfn-od-KEZV-ik AS-id)  Treats high blood calcium levels. Also treats bone damage caused by Paget disease, multiple myeloma, and cancers that spread to the bone. Also treats osteoporosis and reduces the risk of hip fractures in certain patients.   Brand Name(s): Reclast, Zoledronic Acid Novaplus   There may be other brand names for this medicine.  When This Medicine Should Not Be Used:   This medicine is not right for everyone. You should not receive it if you had an allergic reaction to zoledronic acid, or if you are pregnant.  How to Use This Medicine:   Injectable  A nurse or other health provider will give you this medicine.  Your doctor will prescribe your dose and schedule. This medicine is given through a needle placed in a vein.  Your doctor may tell you to drink extra liquids before your treatment to prevent kidney problems.  Your doctor may also give you vitamin D and calcium supplements. Tell your doctor if you are not able to take these medicines.  This medicine should come with a Medication Guide. Ask your pharmacist for a copy if you do not have one.  Missed dose:You must use this medicine on a fixed schedule. Call your doctor or pharmacist if you miss a dose.  Drugs and Foods to Avoid:   Ask your doctor or pharmacist before using any other medicine, including over-the-counter medicines, vitamins, and herbal products.  Do not use other medicines that also contain zoledronic acid. Do not use zoledronic acid together with another bisphosphonate medicine.  Some foods and medicines can affect how zoledronic acid works. Tell your doctor if you are using digoxin, antibiotics, diuretics (water pills), an NSAID pain or arthritis medicine (such as aspirin, celecoxib, ibuprofen, naproxen), steroid medicines, or cancer medicines.  Warnings While Using This Medicine:   It is not safe to take this medicine during pregnancy. It could harm an unborn baby. Tell your  doctor right away if you become pregnant.  Tell your doctor if you are breastfeeding, or if you have kidney disease, anemia, aspirin-sensitive asthma, bleeding problems, cancer, congestive heart failure, low blood calcium levels, stomach absorption problems, mineral imbalance, dental problems or gum disease. Also tell your doctor if you had surgery on your bowel or parathyroid or thyroid gland.  This medicine may cause the following problems:  Jaw or teeth problems  Severe bone, joint, or muscle pain  Increased risk of thigh bone fracture  Low calcium levels in your blood  You must have regular dental exams while you are being treated with this medicine. Tell your dentist or oral surgeon that you are using this medicine.  Your doctor will do lab tests at regular visits to check on the effects of this medicine. Keep all appointments.  Possible Side Effects While Using This Medicine:   Call your doctor right away if you notice any of these side effects:  Allergic reaction: Itching or hives, swelling in your face or hands, swelling or tingling in your mouth or throat, chest tightness, trouble breathing  Chest pain, trouble breathing, fast or uneven heartbeat  Decrease in how much or how often you urinate, blood in the urine, lower back or side pain, burning or painful urination  Muscle spasm or twitching, or numbness or tingling in your fingers, feet, or around your mouth  Pain, swelling, or numbness in the mouth or jaw, loose teeth or other teeth problems  Severe muscle, bone, or joint pain  Unusual pain in your thigh, groin, or hip  If you notice these less serious side effects, talk with your doctor:   Fever, chills, cough, sore throat, and body aches  Headache  Mild nausea, constipation, diarrhea, stomach pain or upset  Redness, pain, or swelling of your skin where the needle is placed  If you notice other side effects that you think are caused by this medicine, tell your doctor.   Call your doctor for medical  advice about side effects. You may report side effects to FDA at 7-386-FDA-8352  © Copyright Merative 2023 Information is for End User's use only and may not be sold, redistributed or otherwise used for commercial purposes.  The above information is an  only. It is not intended as medical advice for individual conditions or treatments. Talk to your doctor, nurse or pharmacist before following any medical regimen to see if it is safe and effective for you.          Denosumab (By injection)   Denosumab (fvu-SYN-jb-mab)  Treats osteoporosis, bone cancer, hypercalcemia, and other bone problems in patients who have cancer.   Brand Name(s): Prolia, Xgeva   There may be other brand names for this medicine.  When This Medicine Should Not Be Used:   This medicine is not right for everyone. You should not receive it if you had an allergic reaction to denosumab, or if you are pregnant.  How to Use This Medicine:   Injectable  Your doctor will prescribe your exact dose and tell you how often it should be given. This medicine is given as a shot under your skin. It is usually given in your upper arm, upper thigh, or stomach.  A nurse or other health provider will give you this medicine.  You may receive other medicines (including calcium supplements, Vitamin D) to prevent unwanted effects.  This medicine should come with a Medication Guide. Ask your pharmacist for a copy if you do not have one.  Missed dose: Call your doctor or pharmacist for instructions.  Drugs and Foods to Avoid:   Ask your doctor or pharmacist before using any other medicine, including over-the-counter medicines, vitamins, and herbal products.  Do not use Prolia® and Xgeva® together. They contain the same medicine.  Some medicines can affect how denosumab works. Tell your doctor if you are also using medicine that weakens your immune system, including a steroid or cancer medicine.  Warnings While Using This Medicine:   It is not safe to take  this medicine during pregnancy. It could harm an unborn baby. Tell your doctor right away if you become pregnant. If you are a woman who can get pregnant, your doctor may do tests to make sure you are not pregnant before receiving this medicine. Use an effective form of birth control to keep from getting pregnant during treatment with this medicine and for 5 months after the last dose.  Tell your doctor if you are breastfeeding, or if you have kidney disease, diabetes, gum disease, or a history of fractures. Tell your doctor if you have problems with your thyroid, parathyroid, or digestive system.  This medicine may cause the following problems:  Increased risk of broken thigh bone  Increased risk of infections  Serious skin reactions  Severe bone, joint, or muscle pain  This medicine can cause jaw problems. You must have regular dental exams while you are being treated with this medicine. Tell your dentist that you are receiving this medicine. Practice good oral hygiene.  Do not suddenly stop using this medicine without checking first with your doctor. Doing so may increase your risk for more fractures. Talk to your doctor about other medicines that you can take.  Your doctor will do lab tests at regular visits to check on the effects of this medicine. Keep all appointments.  Possible Side Effects While Using This Medicine:   Call your doctor right away if you notice any of these side effects:  Allergic reaction: Itching or hives, swelling in your face or hands, swelling or tingling in your mouth or throat, chest tightness, trouble breathing  Blistering, peeling, red skin rash  Chest pain, fast or uneven heartbeat, trouble breathing  Fever, chills, cough, sore throat, body aches  Lightheadedness, dizziness, fainting  Muscle spasms or twitching, numbness or tingling in your fingers, toes, or lips  Pain or burning during urination, change in how much or how often you urinate  Pain, swelling, heavy feeling, or  numbness in your mouth or jaw, loose teeth or other teeth problems  Severe bone, joint, or muscle pain  Sudden and severe stomach pain, nausea, vomiting  Unusual pain in your thigh, groin, or hip  If you notice these less serious side effects, talk with your doctor:   Arm or leg pain  Diarrhea  Redness, pain, itching, burning, swelling, or a lump under your skin where the shot was given  Tiredness or weakness  If you notice other side effects that you think are caused by this medicine, tell your doctor.   Call your doctor for medical advice about side effects. You may report side effects to FDA at 7-484-FDA-7846  © Copyright Merative 2023 Information is for End User's use only and may not be sold, redistributed or otherwise used for commercial purposes.  The above information is an  only. It is not intended as medical advice for individual conditions or treatments. Talk to your doctor, nurse or pharmacist before following any medical regimen to see if it is safe and effective for you.

## 2024-06-14 LAB — TSI SER-ACNC: 0.64 IU/L (ref 0–0.55)

## 2024-07-03 ENCOUNTER — TELEPHONE (OUTPATIENT)
Age: 84
End: 2024-07-03

## 2024-07-03 NOTE — TELEPHONE ENCOUNTER
Patient was notified that she would need to schedule an appointment, patient verbalized understanding.     Appointment scheduled for July 16th.

## 2024-07-03 NOTE — TELEPHONE ENCOUNTER
Patient called in stating that she would like a refill on her clotrimazole-betamethasone (LOTRISONE) 1-0.05 % cream as she is currently out, Patient was notified that a message would be sent to Dr. Clark, Pt verbalized understanding, no further questions at this time      Pharmacy on file confirmed with patient.

## 2024-07-16 ENCOUNTER — ANNUAL EXAM (OUTPATIENT)
Dept: OBGYN CLINIC | Facility: CLINIC | Age: 84
End: 2024-07-16
Payer: COMMERCIAL

## 2024-07-16 VITALS — DIASTOLIC BLOOD PRESSURE: 78 MMHG | WEIGHT: 125 LBS | BODY MASS INDEX: 27.05 KG/M2 | SYSTOLIC BLOOD PRESSURE: 118 MMHG

## 2024-07-16 DIAGNOSIS — Z12.31 ENCOUNTER FOR SCREENING MAMMOGRAM FOR BREAST CANCER: ICD-10-CM

## 2024-07-16 DIAGNOSIS — L30.9 DERMATITIS: Primary | ICD-10-CM

## 2024-07-16 PROCEDURE — 99213 OFFICE O/P EST LOW 20 MIN: CPT | Performed by: OBSTETRICS & GYNECOLOGY

## 2024-07-16 RX ORDER — CLOBETASOL PROPIONATE 0.5 MG/G
CREAM TOPICAL 2 TIMES DAILY
Qty: 30 G | Refills: 3 | Status: SHIPPED | OUTPATIENT
Start: 2024-07-16 | End: 2024-07-21 | Stop reason: CLARIF

## 2024-07-16 NOTE — PATIENT INSTRUCTIONS
The patient has responded well to dermatitis in the vulvar area.  She needs refill of Temovate.  I refill was authorized.  She will continue to use it as needed on a as needed basis.  If it does not respond she will let me know.

## 2024-07-16 NOTE — PROGRESS NOTES
This is an 83-year-old female, she is a  2 para 1.  She has a history of a hysterectomy BSO.  She is not sexually active.  There is a long history of recurrent dermatitis in the vulvar area.  Responds very well to Temovate.  She is not sexually active.  She does not need a yearly exam.  I refill the medication was authorized.  She will keep me informed of her progress.

## 2024-07-18 ENCOUNTER — TELEPHONE (OUTPATIENT)
Age: 84
End: 2024-07-18

## 2024-07-18 NOTE — TELEPHONE ENCOUNTER
Pt called stating she saw  and gave him an incorrect name for medication she needs. It is a cream and the correct name is clotrimazole-betamethasone (LOTRISONE) 1-0.05 % she unfortunately told him it was clobetasol (TEMOVATE) 0.05 % cream. Pt wanted to know if the provider is able to contact the pharmacy and put in a correction. Asked to please call her to further discuss.

## 2024-07-21 DIAGNOSIS — N76.3 SUBACUTE VULVITIS: Primary | ICD-10-CM

## 2024-07-21 RX ORDER — CLOTRIMAZOLE AND BETAMETHASONE DIPROPIONATE 10; .64 MG/G; MG/G
CREAM TOPICAL 2 TIMES DAILY
Qty: 45 G | Refills: 1 | Status: SHIPPED | OUTPATIENT
Start: 2024-07-21

## 2024-07-22 NOTE — TELEPHONE ENCOUNTER
Call to pt and made her aware that script for Lotrisone cream was sent to her pharmacy. Pt thankful.

## 2024-09-04 ENCOUNTER — RA CDI HCC (OUTPATIENT)
Dept: OTHER | Facility: HOSPITAL | Age: 84
End: 2024-09-04

## 2024-09-11 ENCOUNTER — OFFICE VISIT (OUTPATIENT)
Dept: INTERNAL MEDICINE CLINIC | Facility: CLINIC | Age: 84
End: 2024-09-11
Payer: COMMERCIAL

## 2024-09-11 VITALS
HEIGHT: 57 IN | TEMPERATURE: 98 F | HEART RATE: 61 BPM | BODY MASS INDEX: 26.32 KG/M2 | RESPIRATION RATE: 18 BRPM | DIASTOLIC BLOOD PRESSURE: 90 MMHG | SYSTOLIC BLOOD PRESSURE: 160 MMHG | WEIGHT: 122 LBS

## 2024-09-11 DIAGNOSIS — M81.0 OSTEOPOROSIS, UNSPECIFIED OSTEOPOROSIS TYPE, UNSPECIFIED PATHOLOGICAL FRACTURE PRESENCE: ICD-10-CM

## 2024-09-11 DIAGNOSIS — Z13.6 SCREENING FOR CARDIOVASCULAR CONDITION: ICD-10-CM

## 2024-09-11 DIAGNOSIS — E05.90 HYPERTHYROIDISM: ICD-10-CM

## 2024-09-11 DIAGNOSIS — Z23 NEED FOR INFLUENZA VACCINATION: Primary | ICD-10-CM

## 2024-09-11 DIAGNOSIS — E66.3 OVERWEIGHT (BMI 25.0-29.9): ICD-10-CM

## 2024-09-11 DIAGNOSIS — R03.0 ELEVATED BLOOD PRESSURE READING: ICD-10-CM

## 2024-09-11 PROCEDURE — 99213 OFFICE O/P EST LOW 20 MIN: CPT | Performed by: INTERNAL MEDICINE

## 2024-09-11 PROCEDURE — G0008 ADMIN INFLUENZA VIRUS VAC: HCPCS

## 2024-09-11 PROCEDURE — 90662 IIV NO PRSV INCREASED AG IM: CPT

## 2024-09-11 NOTE — PROGRESS NOTES
"  Assessment/Plan:    Overweight (BMI 25.0-29.9)  I have counselled the pt to follow a healthy and balanced diet ,and recommend routine exercise.    Hyperthyroidism  Clinically stable working with endocrinology have reviewed the endocrinology report    Osteoporosis  Weightbearing exercises continue calcium and vitamin D working with endocrinology at this point time she does not want to start MS    Elevated blood pressure reading  Would like patient to check blood pressure daily for the next week and report the readings in 1 week reduce sodium    osteoporosis, vitamin D deficiency, DJD, diabetes, colonic polyps and recently diagnosed mild hyperthyroidism 2\" Graves disease c +ve TSI. Last visit was 12/13/23.      Problem List Items Addressed This Visit          Endocrine    Hyperthyroidism     Clinically stable working with endocrinology have reviewed the endocrinology report         Relevant Orders    TSH, 3rd generation       Musculoskeletal and Integument    Osteoporosis     Weightbearing exercises continue calcium and vitamin D working with endocrinology at this point time she does not want to start MS            Other    Overweight (BMI 25.0-29.9)     I have counselled the pt to follow a healthy and balanced diet ,and recommend routine exercise.          Other Visit Diagnoses       Need for influenza vaccination    -  Primary    Relevant Orders    influenza vaccine, high-dose, PF 0.5 mL (Fluzone High Dose) (Completed)    Screening for cardiovascular condition        Relevant Orders    Comprehensive metabolic panel    Lipid Panel with Direct LDL reflex              Return to office  6 months  call if any problems     Patient ID: Megan Mckenzie is a 84 y.o. female.    HPI 84-year old female coming in for a follow up office visit regarding hypothyroidism, overweight, osteoporosis and elevated blood pressure reading; the patient reports me compliant taking medications without untoward side effects the.  The patient " is here to review his medical condition, update me on the medical condition and the patient reports me no hospitalizations and no ER visits.  No injuries no illnesses overall doing very well patient is very active trying to follow healthy balanced diet here to review laboratories has been to endocrinology.  She would like to receive her flu shot derm rash resolved sister with demention  pt is poa she is in [place] the pt reports trigger finger    The following portions of the patient's history were reviewed and updated as appropriate: allergies, current medications, past family history, past medical history, past social history, past surgical history and problem list.    Review of Systems   Constitutional:  Negative for activity change, appetite change and unexpected weight change.   HENT:  Negative for congestion and postnasal drip.    Eyes:  Negative for visual disturbance.   Respiratory:  Negative for cough and shortness of breath.    Cardiovascular:  Negative for chest pain.   Gastrointestinal:  Negative for abdominal pain, diarrhea, nausea and vomiting.   Neurological:  Negative for dizziness, light-headedness and headaches.   Hematological:  Negative for adenopathy.         Objective:    No follow-ups on file.    No results found.      No Known Allergies    Past Medical History:   Diagnosis Date    Arthritis     Cancer (HCC)     Colon cancer (HCC)     age 52    Colon polyp     GI malignancy (HCC) 2022     Past Surgical History:   Procedure Laterality Date     SECTION      43 years ago    COLON SURGERY      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    JOINT REPLACEMENT  2022    R TKA    KNEE SURGERY      TOTAL ABDOMINAL HYSTERECTOMY Bilateral     age 43    TOTAL ABDOMINAL HYSTERECTOMY W/ BILATERAL SALPINGOOPHORECTOMY Bilateral     fibroids; age 43     Current Outpatient Medications on File Prior to Visit   Medication Sig Dispense Refill    Biotin 1000 MCG tablet Take 1,000  mcg by mouth daily      Calcium 500-125 MG-UNIT TABS Take 1 tablet by mouth daily      Carboxymethylcellulose Sodium (RETAINE [facility] OP) Apply to eye      Cholecalciferol (VITAMIN D3) 1000 units CAPS Take by mouth      cyanocobalamin (VITAMIN B-12) 500 MCG tablet Take 500 mcg by mouth daily      magnesium gluconate (MAGONATE) 500 mg tablet Take 500 mg by mouth 2 (two) times a day      Multiple Vitamin (MULTIVITAMIN) tablet Take 1 tablet by mouth daily      Omega-3 Fatty Acids (OMEGA-3 FISH OIL) 1200 MG CAPS Take by mouth      Probiotic Product (PROBIOTIC-10) CAPS Take by mouth      RESTASIS 0.05 % ophthalmic emulsion       zinc gluconate 50 mg tablet Take 50 mg by mouth daily      clotrimazole-betamethasone (LOTRISONE) 1-0.05 % cream Apply topically 2 (two) times a day (Patient not taking: Reported on 9/11/2024) 45 g 1     No current facility-administered medications on file prior to visit.     Family History   Problem Relation Age of Onset    Thyroid disease Mother     Arthritis Father     Cancer Family     Lung cancer Sister 83    Cancer Sister         Passed  2013 as result of lung cancer    Colon cancer Sister 70    Breast cancer Sister 60        Diagnosed in situ but had one breast removed over 20 yrs ago; still living  very healthy at 87 yrs.    No Known Problems Daughter     No Known Problems Maternal Aunt     No Known Problems Maternal Grandmother     No Known Problems Maternal Grandfather     No Known Problems Paternal Grandmother     No Known Problems Paternal Grandfather     Dementia Brother     Hearing loss Brother      Social History     Socioeconomic History    Marital status:      Spouse name: Not on file    Number of children: Not on file    Years of education: Not on file    Highest education level: Not on file   Occupational History    Not on file   Tobacco Use    Smoking status: Former     Current packs/day: 0.00     Average packs/day: 0.3 packs/day for 26.6 years (9.1 ttl pk-yrs)     Types:  "Cigarettes     Start date:      Quit date:      Years since quittin.7    Smokeless tobacco: Never    Tobacco comments:     Was not heavy smoker   Vaping Use    Vaping status: Never Used   Substance and Sexual Activity    Alcohol use: No    Drug use: No    Sexual activity: Not Currently     Partners: Male   Other Topics Concern    Not on file   Social History Narrative    Not on file     Social Determinants of Health     Financial Resource Strain: Low Risk  (2023)    Overall Financial Resource Strain (CARDIA)     Difficulty of Paying Living Expenses: Not hard at all   Food Insecurity: No Food Insecurity (2024)    Hunger Vital Sign     Worried About Running Out of Food in the Last Year: Never true     Ran Out of Food in the Last Year: Never true   Transportation Needs: No Transportation Needs (2024)    PRAPARE - Transportation     Lack of Transportation (Medical): No     Lack of Transportation (Non-Medical): No   Physical Activity: Not on file   Stress: Not on file   Social Connections: Not on file   Intimate Partner Violence: Not on file   Housing Stability: Low Risk  (2024)    Housing Stability Vital Sign     Unable to Pay for Housing in the Last Year: No     Number of Times Moved in the Last Year: 1     Homeless in the Last Year: No     Vitals:    24   BP: 160/90   BP Location: Left arm   Patient Position: Sitting   Cuff Size: Standard   Pulse: 61   Resp: 18   Temp: 98 °F (36.7 °C)   TempSrc: Tympanic   Weight: 55.3 kg (122 lb)   Height: 4' 9\" (1.448 m)     Results for orders placed or performed in visit on 24   TSH, 3rd generation   Result Value Ref Range    TSH 3RD GENERATON 0.636 0.450 - 4.500 uIU/mL     Weight (last 2 days)       None          Body mass index is 26.4 kg/m².  BP      Temp      Pulse     Resp      SpO2        Vitals:    24   Weight: 55.3 kg (122 lb)     Vitals:    24   Weight: 55.3 kg (122 lb)       /90 (BP Location: Left " "arm, Patient Position: Sitting, Cuff Size: Standard)   Pulse 61   Temp 98 °F (36.7 °C) (Tympanic)   Resp 18   Ht 4' 9\" (1.448 m)   Wt 55.3 kg (122 lb)   BMI 26.40 kg/m²          Physical Exam  Vitals and nursing note reviewed.   Constitutional:       General: She is not in acute distress.     Appearance: Normal appearance. She is well-developed. She is not ill-appearing, toxic-appearing or diaphoretic.   HENT:      Head: Normocephalic.      Mouth/Throat:      Mouth: Oropharynx is clear and moist.   Eyes:      General: No scleral icterus.        Right eye: No discharge.         Left eye: No discharge.      Conjunctiva/sclera: Conjunctivae normal.      Pupils: Pupils are equal, round, and reactive to light.   Cardiovascular:      Rate and Rhythm: Normal rate and regular rhythm.      Heart sounds: Normal heart sounds. No murmur heard.     No friction rub. No gallop.   Pulmonary:      Effort: No respiratory distress.      Breath sounds: Normal breath sounds. No wheezing or rales.   Abdominal:      General: Bowel sounds are normal. There is no distension.      Palpations: Abdomen is soft. There is no mass.      Tenderness: There is no abdominal tenderness. There is no guarding or rebound.   Musculoskeletal:         General: No deformity or edema.      Cervical back: Neck supple.   Lymphadenopathy:      Cervical: No cervical adenopathy.   Neurological:      Mental Status: She is alert.      Coordination: Coordination normal.         "

## 2024-09-15 PROBLEM — R03.0 ELEVATED BLOOD PRESSURE READING: Status: ACTIVE | Noted: 2024-09-15

## 2024-09-15 NOTE — ASSESSMENT & PLAN NOTE
Weightbearing exercises continue calcium and vitamin D working with endocrinology at this point time she does not want to start MS

## 2024-09-15 NOTE — ASSESSMENT & PLAN NOTE
Would like patient to check blood pressure daily for the next week and report the readings in 1 week reduce sodium

## 2024-09-16 ENCOUNTER — TELEPHONE (OUTPATIENT)
Dept: INTERNAL MEDICINE CLINIC | Facility: CLINIC | Age: 84
End: 2024-09-16

## 2024-09-16 NOTE — TELEPHONE ENCOUNTER
----- Message from Boubacar Lee DO sent at 9/15/2024  8:54 AM EDT -----  Please have patient check blood pressure once daily for the next 1 week and report the readings   Physical Therapy  Co-Treatment with OT    Patient Name:  Riri Gonsalves   MRN:  [id number]    Recommendations:     Discharge Recommendations:  nursing facility, skilled   Discharge Equipment Recommendations: (will determine DME needs closer to discharge)   Barriers to discharge: Decreased caregiver support family will not be able to assist at current functional level.     Assessment:     Riri Gonsalves is a 25 y.o. female admitted with a medical diagnosis of Acute respiratory failure.  She presents with the following impairments/functional limitations:  weakness, impaired functional mobilty, gait instability, impaired endurance, impaired balance, decreased lower extremity function  Pt shannon treatment better being able to perform BLE there exer and needing less assistance for mobility. Pt will benefit from cont skilled PT 5x/wk to progress physically. Pt will need SNF placement to maximize rehab potential.     Rehab Prognosis: Good; patient would benefit from acute skilled PT services to address these deficits and reach maximum level of function.    Recent Surgery: * No surgery found *      Plan:     During this hospitalization, patient to be seen 5 x/week to address the identified rehab impairments via gait training, therapeutic activities, therapeutic exercises and progress toward the following goals:    · Plan of Care Expires:  04/24/20    Subjective     Chief Complaint: pt had no complaints during treatment.   Patient/Family Comments/goals:  To get better and go home.   Pain/Comfort:  · Pain Rating 1: 0/10  · Pain Rating Post-Intervention 1: 0/10      Objective:     Communicated with nurse prior to session.  Patient found supine with telemetry, blood pressure cuff, pulse ox (continuous), oxygen, central line(comfort flow) upon PT entry to room.     General Precautions: Standard, fall, airborne   Orthopedic Precautions:    Braces:       Functional Mobility:  · Bed Mobility:   Pt needed verbal cues for hand placement  and sequencing for functional mobility.   · Rolling Left:  moderate assistance  · Supine to Sit: moderate assistance  · Sit to Supine: maximal assistance  ·   · Transfers:     · Sit to Stand:  minimum assistance with hand-held assist. Pt was CGA static standing balance to brush teeth with OT. Pt was not able to complete task in standing and had to return to sitting on EOB.  Pt stood from bed x 3 reps with min assist.  Pt was max assist x 2 sit to stand from chair and transfer back to bed.   ·   · Bed to Chair: moderate assistance with  hand-held assist  using  Stand Pivot. Pt sat in chair x 1.5 hours.  ·   · Balance: pt sat on EOB with SBA to perform ADLS with OT.       AM-PAC 6 CLICK MOBILITY  Turning over in bed (including adjusting bedclothes, sheets and blankets)?: 2  Sitting down on and standing up from a chair with arms (e.g., wheelchair, bedside commode, etc.): 3  Moving from lying on back to sitting on the side of the bed?: 2  Moving to and from a bed to a chair (including a wheelchair)?: 2  Need to walk in hospital room?: 1  Climbing 3-5 steps with a railing?: 1  Basic Mobility Total Score: 11       Therapeutic Activities and Exercises:   pt performed AAROM there exer BLE in supine x 15 reps and was given verbal and demonstration of BLE there exer in sitting. Pt received verbal instruction to perform there exer in sitting x 10 reps each day. Pt verbally expressed understanding of such.     Patient left supine with all lines intact and call button in reach..    GOALS:   Multidisciplinary Problems     Physical Therapy Goals        Problem: Physical Therapy Goal    Goal Priority Disciplines Outcome Goal Variances Interventions   Physical Therapy Goal     PT, PT/OT Ongoing, Progressing     Description:  Goals to be met by: 20    Patient will increase functional independence with mobility by performin. Supine to sit with MInimal Assistance -not met  2. Sit to stand transfer with Minimal Assistance  -not met  3. Gait  x 100 feet with Contact Guard Assistance using AD if needed. -not met  4. Ascend/descend 8 stair with left Handrails Contact Guard Assistance - not met  5. Lower extremity exercise program x15 reps  with assistance as needed -not met                      Time Tracking:     PT Received On: 03/27/20  PT Start Time: 1000     PT Stop Time: 1036  PT Total Time (min): 36 min     2nd start time: 12:05  2nd end time :12:20  (36 min +15 min + 41 min total treatment time)     Billable Minutes: Therapeutic Activity 18 min and Therapeutic Exercise 23 min    Treatment Type: (co-treatment)  PT/PTA: PT     PTA Visit Number: 0     Sharlene Jhaveri, PT  03/27/2020

## 2024-09-23 NOTE — TELEPHONE ENCOUNTER
Patient called back to provide BP readings:  They were taken on the left arm sitting down.  9/16 - between 3 - 4 pm  150/83, HR: 58    9/17 - 11:30 am  132/88, HR: 58    9/18 - 10:20 am  139/88 HR: 58    9/19 - 11 am?  138/88, HR: 58  118/86, HR: 60 - 11:15 am    9/20 - 11:15 am  129/81, HR: 59  140/91, HR:61 - 11 pm    Patient also stated she is due to travel on Wednesday to visit her brother who is 10 years older than she is, in [place].  She was informed by his daughter that he was diagnosed with Covid, she believes on Friday, 9/20.  She will be bringing her sister who, is about 6 years older and living in an assisted living facility.  She is concerned and would like some guidance on if she and her sister should go to visit him or if it is best not to go. Okay to leave a voicemail if patient is unable to answer. Please advise.  Thank you!

## 2024-12-19 ENCOUNTER — HOSPITAL ENCOUNTER (OUTPATIENT)
Dept: RADIOLOGY | Age: 84
Discharge: HOME | End: 2024-12-19
Payer: COMMERCIAL

## 2024-12-19 DIAGNOSIS — Z12.31 ENCOUNTER FOR SCREENING MAMMOGRAM FOR BREAST CANCER: ICD-10-CM

## 2024-12-19 PROCEDURE — 77067 SCR MAMMO BI INCL CAD: CPT

## 2024-12-19 PROCEDURE — 77063 BREAST TOMOSYNTHESIS BI: CPT

## 2025-02-21 ENCOUNTER — TELEPHONE (OUTPATIENT)
Age: 85
End: 2025-02-21

## 2025-02-21 NOTE — TELEPHONE ENCOUNTER
Patient calling looking for a new ophthalmologist.  She stated that her current ophthalmologist Dr. Ashok Cooney is not answering the phone at his offices.    Please call patient with a recommendations of ophthalmologist in the area.    Thank you.

## 2025-02-24 DIAGNOSIS — Z01.00 NORMAL EYE AND VISION EXAM: Primary | ICD-10-CM

## 2025-03-14 ENCOUNTER — OFFICE VISIT (OUTPATIENT)
Dept: INTERNAL MEDICINE CLINIC | Facility: CLINIC | Age: 85
End: 2025-03-14
Payer: MEDICARE

## 2025-03-14 VITALS
OXYGEN SATURATION: 98 % | HEART RATE: 65 BPM | WEIGHT: 126 LBS | DIASTOLIC BLOOD PRESSURE: 100 MMHG | BODY MASS INDEX: 27.27 KG/M2 | SYSTOLIC BLOOD PRESSURE: 150 MMHG

## 2025-03-14 DIAGNOSIS — I10 PRIMARY HYPERTENSION: ICD-10-CM

## 2025-03-14 DIAGNOSIS — M81.0 OSTEOPOROSIS, UNSPECIFIED OSTEOPOROSIS TYPE, UNSPECIFIED PATHOLOGICAL FRACTURE PRESENCE: Primary | ICD-10-CM

## 2025-03-14 DIAGNOSIS — E66.3 OVERWEIGHT (BMI 25.0-29.9): ICD-10-CM

## 2025-03-14 DIAGNOSIS — E05.90 HYPERTHYROIDISM: ICD-10-CM

## 2025-03-14 DIAGNOSIS — Z00.00 MEDICARE ANNUAL WELLNESS VISIT, SUBSEQUENT: ICD-10-CM

## 2025-03-14 DIAGNOSIS — Z13.6 SCREENING FOR CARDIOVASCULAR CONDITION: ICD-10-CM

## 2025-03-14 PROCEDURE — 99214 OFFICE O/P EST MOD 30 MIN: CPT | Performed by: INTERNAL MEDICINE

## 2025-03-14 PROCEDURE — G0438 PPPS, INITIAL VISIT: HCPCS | Performed by: INTERNAL MEDICINE

## 2025-03-14 RX ORDER — AMLODIPINE BESYLATE 5 MG/1
5 TABLET ORAL DAILY
Qty: 30 TABLET | Refills: 5 | Status: SHIPPED | OUTPATIENT
Start: 2025-03-14

## 2025-03-14 NOTE — ASSESSMENT & PLAN NOTE
Improved currently she remains euthyroid having endocrinology clinically stable and doing well continue the current medical regiment will continue monitor.  Will check TSH  Orders:  •  TSH, 3rd generation; Future

## 2025-03-14 NOTE — PROGRESS NOTES
Name: Megan Mckenzie      : 1940      MRN: [id number]  Encounter Provider: Boubacar Lee DO  Encounter Date: 3/14/2025   Encounter [facility]: [facility]  Assessment & Plan  Osteoporosis, unspecified osteoporosis type, unspecified pathological fracture presence  Patient prefers not to be on treatment at this point the DEXA scan has been ordered through her endocrinologist, I did  the patient on the concerns of osteoporosis risk of fracture and treatment options recommend weightbearing exercises, calcium and vitamin D encouraged to consider Prolia at this point time she does not want treatment       Screening for cardiovascular condition  I have counselled the pt to follow a healthy and balanced diet ,and recommend routine exercise.  Orders:  •  Comprehensive metabolic panel; Future  •  Lipid Panel with Direct LDL reflex; Future    Hyperthyroidism  Improved currently she remains euthyroid having endocrinology clinically stable and doing well continue the current medical regiment will continue monitor.  Will check TSH  Orders:  •  TSH, 3rd generation; Future    Primary hypertension  Suboptimal control blood pressure in office 150/100 will start amlodipine 5 mg once daily patient is hesitant about starting medication advised her to check her blood pressure at home for next 3 days if it remains elevated greater than 140/90 she can start the amlodipine 5 mg once daily she will keep a log for me I have given her information about hypertension.  I have explained to the patient of risk of untreated hypertension including increased risk for cardiovascular disease including stroke and heart attack.  Patient is asymptomatic reduce sodium  Orders:  •  amLODIPine (NORVASC) 5 mg tablet; Take 1 tablet (5 mg total) by mouth daily    Medicare annual wellness visit, subsequent  Assessment and plan 1.  Medicare subsequent annual wellness examination overall the patient is  clinically stable and doing well, we encouraged the patient to follow a healthy and balanced diet.  We recommend that the patient exercise routinely approximately 30 minutes 5 times per week .  We have reviewed the patient's vaccines and have made recommendations for updates if necessary    up-to-date with shingles vaccine, pneumonia shot, flu shot consider COVID booster.  We will be ordering screening laboratories which are age appropriate.  Return to the office in   4 weeks   call if any problems.       Overweight (BMI 25.0-29.9)  I have counselled the pt to follow a healthy and balanced diet ,and recommend routine exercise.           Depression Screening and Follow-up Plan: Patient was screened for depression during today's encounter. They screened negative with a PHQ-2 score of 0.        Preventive health issues were discussed with patient, and age appropriate screening tests were ordered as noted in patient's After Visit Summary. Personalized health advice and appropriate referrals for health education or preventive services given if needed, as noted in patient's After Visit Summary.    History of Present Illness     HPI 84-year old female coming in for a follow up office visit regarding osteoporosis, hypothyroidism, primary hypertension; the patient reports me compliant taking medications without untoward side effects the.  The patient is here to review his medical condition, update me on the medical condition and the patient reports me no hospitalizations and no ER visits.  No injuries no illnesses no headache no lightheadedness no dizziness no double vision.  There is review laboratories in detail overall has had a good 6 months without any major injuries or illnesses.  She has been working with endocrinology Dr. Yip at this point in time with shared decision making she does not want to go on medicine for osteoporosis we did  her at length about the importance.  She has another DEXA scan  ordered to reevaluate.  Try to follow-up and balanced diet remains active  Patient Care Team:  Boubacar Lee DO as PCP - General  Courtney Clark DO    Review of Systems   Constitutional:  Negative for activity change, appetite change and unexpected weight change.   HENT:  Negative for congestion and postnasal drip.    Eyes:  Negative for visual disturbance.   Respiratory:  Negative for cough and shortness of breath.    Cardiovascular:  Negative for chest pain.   Gastrointestinal:  Negative for abdominal pain, diarrhea, nausea and vomiting.   Neurological:  Negative for dizziness, light-headedness and headaches.   Hematological:  Negative for adenopathy.     Medical History Reviewed by provider this encounter:  Tobacco  Allergies  Meds  Problems  Med Hx  Surg Hx  Fam Hx       Annual Wellness Visit Questionnaire   Megan is here for her Subsequent Wellness visit.     Health Risk Assessment:   Patient rates overall health as very good. Patient feels that their physical health rating is same. Patient is satisfied with their life. Eyesight was rated as same. Hearing was rated as same. Patient feels that their emotional and mental health rating is same. Patients states they are never, rarely angry. Patient states they are never, rarely unusually tired/fatigued. Pain experienced in the last 7 days has been none. Patient states that she has experienced no weight loss or gain in last 6 months.     Depression Screening:   PHQ-2 Score: 0      Fall Risk Screening:   In the past year, patient has experienced: no history of falling in past year      Urinary Incontinence Screening:   Patient has not leaked urine accidently in the last six months.     Home Safety:  Patient does not have trouble with stairs inside or outside of their home. Patient has working smoke alarms and has working carbon monoxide detector. Home safety hazards include: none.     Nutrition:   Current diet is Regular.     Medications:   Patient is  not currently taking any over-the-counter supplements. Patient is able to manage medications.     Activities of Daily Living (ADLs)/Instrumental Activities of Daily Living (IADLs):   Walk and transfer into and out of bed and chair?: Yes  Dress and groom yourself?: Yes    Bathe or shower yourself?: Yes    Feed yourself? Yes  Do your laundry/housekeeping?: Yes  Manage your money, pay your bills and track your expenses?: Yes  Make your own meals?: Yes    Do your own shopping?: Yes    Previous Hospitalizations:   Any hospitalizations or ED visits within the last 12 months?: No      Advance Care Planning:   Living will: Yes    Durable POA for healthcare: Yes    Advanced directive: Yes      Cognitive Screening:   Provider or family/friend/caregiver concerned regarding cognition?: No    PREVENTIVE SCREENINGS      Cardiovascular Screening:    General: Screening Current      Diabetes Screening:     General: Screening Current      Colorectal Cancer Screening:     General: Screening Not Indicated      Breast Cancer Screening:     General: Screening Current      Cervical Cancer Screening:    General: Screening Not Indicated      Osteoporosis Screening:    General: Screening Not Indicated and History Osteoporosis      Lung Cancer Screening:     General: Screening Not Indicated    Screening, Brief Intervention, and Referral to Treatment (SBIRT)     Screening  Typical number of drinks in a day: 0  Typical number of drinks in a week: 0  Interpretation: Low risk drinking behavior.    AUDIT-C Screenin) How often did you have a drink containing alcohol in the past year? never  2) How many drinks did you have on a typical day when you were drinking in the past year? 0  3) How often did you have 6 or more drinks on one occasion in the past year? never    AUDIT-C Score: 0  Interpretation: Score 0-2 (female): Negative screen for alcohol misuse    Single Item Drug Screening:  How often have you used an illegal drug (including  marijuana) or a prescription medication for non-medical reasons in the past year? never    Single Item Drug Screen Score: 0  Interpretation: Negative screen for possible drug use disorder    Social Drivers of Health     Financial Resource Strain: Low Risk  (4/18/2023)    Overall Financial Resource Strain (CARDIA)    • Difficulty of Paying Living Expenses: Not hard at all   Food Insecurity: No Food Insecurity (3/14/2025)    Hunger Vital Sign    • Worried About Running Out of Food in the Last Year: Never true    • Ran Out of Food in the Last Year: Never true   Transportation Needs: No Transportation Needs (3/14/2025)    PRAPARE - Transportation    • Lack of Transportation (Medical): No    • Lack of Transportation (Non-Medical): No   Housing Stability: Low Risk  (3/14/2025)    Housing Stability Vital Sign    • Unable to Pay for Housing in the Last Year: No    • Number of Times Moved in the Last Year: 1    • Homeless in the Last Year: No   Utilities: Not At Risk (3/14/2025)    [facility] Utilities    • Threatened with loss of utilities: No     No results found.    Objective   /100 (BP Location: Left arm, Patient Position: Sitting, Cuff Size: Standard)   Pulse 65   Wt 57.2 kg (126 lb)   SpO2 98%   BMI 27.27 kg/m²     Physical Exam  Constitutional:       Appearance: She is well-developed.   HENT:      Head: Normocephalic and atraumatic.      Right Ear: External ear normal.      Left Ear: External ear normal.      Nose: Nose normal.   Eyes:      Pupils: Pupils are equal, round, and reactive to light.   Cardiovascular:      Rate and Rhythm: Normal rate and regular rhythm.      Heart sounds: Normal heart sounds. No murmur heard.  Pulmonary:      Effort: Pulmonary effort is normal.      Breath sounds: Normal breath sounds.   Abdominal:      General: There is no distension.      Palpations: Abdomen is soft.      Tenderness: There is no abdominal tenderness. There is no guarding.     No edema

## 2025-03-16 NOTE — PATIENT INSTRUCTIONS
Medicare Preventive Visit Patient Instructions  Thank you for completing your Welcome to Medicare Visit or Medicare Annual Wellness Visit today. Your next wellness visit will be due in one year (3/17/2026).  The screening/preventive services that you may require over the next 5-10 years are detailed below. Some tests may not apply to you based off risk factors and/or age. Screening tests ordered at today's visit but not completed yet may show as past due. Also, please note that scanned in results may not display below.  Preventive Screenings:  Service Recommendations Previous Testing/Comments   Colorectal Cancer Screening  * Colonoscopy    * Fecal Occult Blood Test (FOBT)/Fecal Immunochemical Test (FIT)  * Fecal DNA/Cologuard Test  * Flexible Sigmoidoscopy Age: 45-75 years old   Colonoscopy: every 10 years (may be performed more frequently if at higher risk)  OR  FOBT/FIT: every 1 year  OR  Cologuard: every 3 years  OR  Sigmoidoscopy: every 5 years  Screening may be recommended earlier than age 45 if at higher risk for colorectal cancer. Also, an individualized decision between you and your healthcare provider will decide whether screening between the ages of 76-85 would be appropriate. Colonoscopy: 05/13/2022  FOBT/FIT: Not on file  Cologuard: Not on file  Sigmoidoscopy: Not on file    Screening Not Indicated     Breast Cancer Screening Age: 40+ years old  Frequency: every 1-2 years  Not required if history of left and right mastectomy Mammogram: 12/19/2024    Screening Current   Cervical Cancer Screening Between the ages of 21-29, pap smear recommended once every 3 years.   Between the ages of 30-65, can perform pap smear with HPV co-testing every 5 years.   Recommendations may differ for women with a history of total hysterectomy, cervical cancer, or abnormal pap smears in past. Pap Smear: 04/16/2019    Screening Not Indicated   Hepatitis C Screening Once for adults born between 1945 and 1965  More frequently in  patients at high risk for Hepatitis C Hep C Antibody: Not on file        Diabetes Screening 1-2 times per year if you're at risk for diabetes or have pre-diabetes Fasting glucose: 82 mg/dL (4/15/2024)  A1C: 5.8 % (9/29/2022)  Screening Current   Cholesterol Screening Once every 5 years if you don't have a lipid disorder. May order more often based on risk factors. Lipid panel: 04/15/2024    Screening Current     Other Preventive Screenings Covered by Medicare:  Abdominal Aortic Aneurysm (AAA) Screening: covered once if your at risk. You're considered to be at risk if you have a family history of AAA.  Lung Cancer Screening: covers low dose CT scan once per year if you meet all of the following conditions: (1) Age 55-77; (2) No signs or symptoms of lung cancer; (3) Current smoker or have quit smoking within the last 15 years; (4) You have a tobacco smoking history of at least 20 pack years (packs per day multiplied by number of years you smoked); (5) You get a written order from a healthcare provider.  Glaucoma Screening: covered annually if you're considered high risk: (1) You have diabetes OR (2) Family history of glaucoma OR (3)  aged 50 and older OR (4)  American aged 65 and older  Osteoporosis Screening: covered every 2 years if you meet one of the following conditions: (1) You're estrogen deficient and at risk for osteoporosis based off medical history and other findings; (2) Have a vertebral abnormality; (3) On glucocorticoid therapy for more than 3 months; (4) Have primary hyperparathyroidism; (5) On osteoporosis medications and need to assess response to drug therapy.   Last bone density test (DXA Scan): 03/08/2023.  HIV Screening: covered annually if you're between the age of 15-65. Also covered annually if you are younger than 15 and older than 65 with risk factors for HIV infection. For pregnant patients, it is covered up to 3 times per pregnancy.    Immunizations:  Immunization  Recommendations   Influenza Vaccine Annual influenza vaccination during flu season is recommended for all persons aged >= 6 months who do not have contraindications   Pneumococcal Vaccine   * Pneumococcal conjugate vaccine = PCV13 (Prevnar 13), PCV15 (Vaxneuvance), PCV20 (Prevnar 20)  * Pneumococcal polysaccharide vaccine = PPSV23 (Pneumovax) Adults 19-63 yo with certain risk factors or if 65+ yo  If never received any pneumonia vaccine: recommend Prevnar 20 (PCV20)  Give PCV20 if previously received 1 dose of PCV13 or PPSV23   Hepatitis B Vaccine 3 dose series if at intermediate or high risk (ex: diabetes, end stage renal disease, liver disease)   Respiratory syncytial virus (RSV) Vaccine - COVERED BY MEDICARE PART D  * RSVPreF3 (Arexvy) CDC recommends that adults 60 years of age and older may receive a single dose of RSV vaccine using shared clinical decision-making (SCDM)   Tetanus (Td) Vaccine - COST NOT COVERED BY MEDICARE PART B Following completion of primary series, a booster dose should be given every 10 years to maintain immunity against tetanus. Td may also be given as tetanus wound prophylaxis.   Tdap Vaccine - COST NOT COVERED BY MEDICARE PART B Recommended at least once for all adults. For pregnant patients, recommended with each pregnancy.   Shingles Vaccine (Shingrix) - COST NOT COVERED BY MEDICARE PART B  2 shot series recommended in those 19 years and older who have or will have weakened immune systems or those 50 years and older     Health Maintenance Due:      Topic Date Due   • DXA SCAN  03/08/2025     Immunizations Due:      Topic Date Due   • DTaP,Tdap,and Td Vaccines (1 - Tdap) 07/24/2013   • COVID-19 Vaccine (4 - 2024-25 season) 09/01/2024     Advance Directives   What are advance directives?  Advance directives are legal documents that state your wishes and plans for medical care. These plans are made ahead of time in case you lose your ability to make decisions for yourself. Advance  directives can apply to any medical decision, such as the treatments you want, and if you want to donate organs.   What are the types of advance directives?  There are many types of advance directives, and each state has rules about how to use them. You may choose a combination of any of the following:  Living will:  This is a written record of the treatment you want. You can also choose which treatments you do not want, which to limit, and which to stop at a certain time. This includes surgery, medicine, IV fluid, and tube feedings.   Durable power of  for healthcare (DPAHC):  This is a written record that states who you want to make healthcare choices for you when you are unable to make them for yourself. This person, called a proxy, is usually a family member or a friend. You may choose more than 1 proxy.  Do not resuscitate (DNR) order:  A DNR order is used in case your heart stops beating or you stop breathing. It is a request not to have certain forms of treatment, such as CPR. A DNR order may be included in other types of advance directives.  Medical directive:  This covers the care that you want if you are in a coma, near death, or unable to make decisions for yourself. You can list the treatments you want for each condition. Treatment may include pain medicine, surgery, blood transfusions, dialysis, IV or tube feedings, and a ventilator (breathing machine).  Values history:  This document has questions about your views, beliefs, and how you feel and think about life. This information can help others choose the care that you would choose.  Why are advance directives important?  An advance directive helps you control your care. Although spoken wishes may be used, it is better to have your wishes written down. Spoken wishes can be misunderstood, or not followed. Treatments may be given even if you do not want them. An advance directive may make it easier for your family to make difficult choices about  your care.   Weight Management   Why it is important to manage your weight:  Being overweight increases your risk of health conditions such as heart disease, high blood pressure, type 2 diabetes, and certain types of cancer. It can also increase your risk for osteoarthritis, sleep apnea, and other respiratory problems. Aim for a slow, steady weight loss. Even a small amount of weight loss can lower your risk of health problems.  How to lose weight safely:  A safe and healthy way to lose weight is to eat fewer calories and get regular exercise. You can lose up about 1 pound a week by decreasing the number of calories you eat by 500 calories each day.   Healthy meal plan for weight management:  A healthy meal plan includes a variety of foods, contains fewer calories, and helps you stay healthy. A healthy meal plan includes the following:  Eat whole-grain foods more often.  A healthy meal plan should contain fiber. Fiber is the part of grains, fruits, and vegetables that is not broken down by your body. Whole-grain foods are healthy and provide extra fiber in your diet. Some examples of whole-grain foods are whole-wheat breads and pastas, oatmeal, brown rice, and bulgur.  Eat a variety of vegetables every day.  Include dark, leafy greens such as spinach, kale, cassandra greens, and mustard greens. Eat yellow and orange vegetables such as carrots, sweet potatoes, and winter squash.   Eat a variety of fruits every day.  Choose fresh or canned fruit (canned in its own juice or light syrup) instead of juice. Fruit juice has very little or no fiber.  Eat low-fat dairy foods.  Drink fat-free (skim) milk or 1% milk. Eat fat-free yogurt and low-fat cottage cheese. Try low-fat cheeses such as mozzarella and other reduced-fat cheeses.  Choose meat and other protein foods that are low in fat.  Choose beans or other legumes such as split peas or lentils. Choose fish, skinless poultry (chicken or turkey), or lean cuts of red meat  (beef or pork). Before you cook meat or poultry, cut off any visible fat.   Use less fat and oil.  Try baking foods instead of frying them. Add less fat, such as margarine, sour cream, regular salad dressing and mayonnaise to foods. Eat fewer high-fat foods. Some examples of high-fat foods include french fries, doughnuts, ice cream, and cakes.  Eat fewer sweets.  Limit foods and drinks that are high in sugar. This includes candy, cookies, regular soda, and sweetened drinks.  Exercise:  Exercise at least 30 minutes per day on most days of the week. Some examples of exercise include walking, biking, dancing, and swimming. You can also fit in more physical activity by taking the stairs instead of the elevator or parking farther away from stores. Ask your healthcare provider about the best exercise plan for you.      © Copyright [employer] 2018 Information is for End User's use only and may not be sold, redistributed or otherwise used for commercial purposes. All illustrations and images included in CareNotes® are the copyrighted property of A.D.A[employer]M., Inc. or [employer]

## 2025-03-24 ENCOUNTER — TELEPHONE (OUTPATIENT)
Age: 85
End: 2025-03-24

## 2025-03-24 NOTE — TELEPHONE ENCOUNTER
Patient had a loss in the family and would like her upcoming appt moved to the 28th and on. Please ask Dr. Lee and call patient back.  Francoise Nicholson

## 2025-04-02 ENCOUNTER — TELEPHONE (OUTPATIENT)
Dept: INTERNAL MEDICINE CLINIC | Facility: CLINIC | Age: 85
End: 2025-04-02

## 2025-04-02 NOTE — TELEPHONE ENCOUNTER
Patient called requesting refill for amlodipine. Patient made aware medication was refilled on 3/14/25 for 30 with 5 refills to [facility] pharmacy. Patient instructed to contact the pharmacy to obtain refills of medication. Patient verbalized understanding.

## 2025-05-01 ENCOUNTER — APPOINTMENT (OUTPATIENT)
Dept: LAB | Facility: HOSPITAL | Age: 85
End: 2025-05-01
Payer: COMMERCIAL

## 2025-05-01 DIAGNOSIS — E05.90 HYPERTHYROIDISM: ICD-10-CM

## 2025-05-01 DIAGNOSIS — Z13.6 SCREENING FOR CARDIOVASCULAR CONDITION: ICD-10-CM

## 2025-05-01 LAB
ALBUMIN SERPL BCG-MCNC: 4.2 G/DL (ref 3.5–5)
ALP SERPL-CCNC: 85 U/L (ref 34–104)
ALT SERPL W P-5'-P-CCNC: 39 U/L (ref 7–52)
ANION GAP SERPL CALCULATED.3IONS-SCNC: 8 MMOL/L (ref 4–13)
AST SERPL W P-5'-P-CCNC: 36 U/L (ref 13–39)
BILIRUB SERPL-MCNC: 0.63 MG/DL (ref 0.2–1)
BUN SERPL-MCNC: 16 MG/DL (ref 5–25)
CALCIUM SERPL-MCNC: 9.2 MG/DL (ref 8.4–10.2)
CHLORIDE SERPL-SCNC: 103 MMOL/L (ref 96–108)
CHOLEST SERPL-MCNC: 199 MG/DL (ref ?–200)
CO2 SERPL-SCNC: 30 MMOL/L (ref 21–32)
CREAT SERPL-MCNC: 0.68 MG/DL (ref 0.6–1.3)
GFR SERPL CREATININE-BSD FRML MDRD: 79 ML/MIN/1.73SQ M
GLUCOSE P FAST SERPL-MCNC: 85 MG/DL (ref 65–99)
HDLC SERPL-MCNC: 93 MG/DL
LDLC SERPL CALC-MCNC: 88 MG/DL (ref 0–100)
POTASSIUM SERPL-SCNC: 4.2 MMOL/L (ref 3.5–5.3)
PROT SERPL-MCNC: 7.5 G/DL (ref 6.4–8.4)
SODIUM SERPL-SCNC: 141 MMOL/L (ref 135–147)
TRIGL SERPL-MCNC: 89 MG/DL (ref ?–150)
TSH SERPL DL<=0.05 MIU/L-ACNC: 0.66 UIU/ML (ref 0.45–4.5)

## 2025-05-01 PROCEDURE — 80061 LIPID PANEL: CPT

## 2025-05-01 PROCEDURE — 36415 COLL VENOUS BLD VENIPUNCTURE: CPT

## 2025-05-01 PROCEDURE — 84443 ASSAY THYROID STIM HORMONE: CPT

## 2025-05-01 PROCEDURE — 80053 COMPREHEN METABOLIC PANEL: CPT

## 2025-05-02 ENCOUNTER — OFFICE VISIT (OUTPATIENT)
Dept: INTERNAL MEDICINE CLINIC | Facility: CLINIC | Age: 85
End: 2025-05-02
Payer: COMMERCIAL

## 2025-05-02 VITALS
OXYGEN SATURATION: 97 % | BODY MASS INDEX: 27.61 KG/M2 | SYSTOLIC BLOOD PRESSURE: 142 MMHG | DIASTOLIC BLOOD PRESSURE: 92 MMHG | HEIGHT: 57 IN | WEIGHT: 128 LBS | HEART RATE: 54 BPM

## 2025-05-02 DIAGNOSIS — M81.0 OSTEOPOROSIS, UNSPECIFIED OSTEOPOROSIS TYPE, UNSPECIFIED PATHOLOGICAL FRACTURE PRESENCE: ICD-10-CM

## 2025-05-02 DIAGNOSIS — E05.90 HYPERTHYROIDISM: Primary | ICD-10-CM

## 2025-05-02 DIAGNOSIS — Z63.6 CAREGIVER STRESS: ICD-10-CM

## 2025-05-02 DIAGNOSIS — E66.3 OVERWEIGHT (BMI 25.0-29.9): ICD-10-CM

## 2025-05-02 DIAGNOSIS — I10 PRIMARY HYPERTENSION: ICD-10-CM

## 2025-05-02 PROCEDURE — 99214 OFFICE O/P EST MOD 30 MIN: CPT | Performed by: INTERNAL MEDICINE

## 2025-05-02 SDOH — SOCIAL STABILITY - SOCIAL INSECURITY: DEPENDENT RELATIVE NEEDING CARE AT HOME: Z63.6

## 2025-05-02 NOTE — PROGRESS NOTES
Name: Megan Mckenzie      : 1940      MRN: [id number]  Encounter Provider: Boubacar Lee DO  Encounter Date: 2025   Encounter department: [facility]  :  Assessment & Plan  Hyperthyroidism  Most recent TSH is now euthyroid 0.6 currently off methimazole I did review the notes from endocrinology currently she is stable/asymptomatic       Osteoporosis, unspecified osteoporosis type, unspecified pathological fracture presence  I have reviewed endocrinology note she has been offered Prolia and Reclast but has not decided at this point weightbearing exercises calcium and vitamin D supplementation       Overweight (BMI 25.0-29.9)  {I have counselled the pt to follow a healthy and balanced diet ,and recommend routine exercise.       Primary hypertension  Hypertension - controlled, I have counseled patient following healthy balance diet, I would like the patient reduce sodium, exercise routinely, I would like the patient continued the med current medical regiment and we will continue to monitor.  Patient reports me that she has not developed a schedule or take medicine I explained the importance of taking a blood pressure pill at this time every day and setting up a good schedule she should consider a reminder dk I have explained to her the importance to reduce the risk of cardiovascular disease including stroke she has agreed       Caregiver stress  Today we had a long discussion about her sister who lives in [place] and in a nursing home for what sounds to be frontal lobe dementia she is having significant memory loss along with behavioral issues.  Patient is planning to bring her back to the [facility] for nursing home care.  She is very concerned about her sister and that she does not have the level of care where she is at in [place] as she is planning to go to [place] and drive her home to [facility] and work on placement.  Today we did provide supportive  listening and counseling for the patient.  At this point time she does not need a medication for her stress       Return to office  6 months  call if any problems       History of Present Illness   HPI 84-year old female coming in for a follow up office visit regarding hypothyroidism, osteoporosis, overweight, hypertension and caregiver stress; the patient reports me compliant taking medications without untoward side effects the.  The patient is here to review his medical condition, update me on the medical condition and the patient reports me no hospitalizations and no ER visits.  No injuries no illnesses patient reports me she has not been able to set a good pattern for her hypertensive condition this is because she wakes at different times in the morning and there is no set pattern.  Today she did not take her pill as of yet.  She also reports me caregiver stress she is worried about her sister who has advanced dementia for which sounds like frontal lobe dementia because she has significant behavioral issues.  She is currently living in assisted care but needs higher level of care, she is planning to go to [place] and drive her home to [facility] for a nursing home placement for her sister.  She is also worked with endocrinology guarding the hyperthyroidism and reports that she has not signed starting a medication for osteoporosis at this point.  Has no illnesses prior to following up on exact taking the norvase 130/80 didn't take the med yet today  Review of Systems   Constitutional:  Negative for activity change, appetite change and unexpected weight change.   HENT:  Negative for congestion and postnasal drip.    Eyes:  Negative for visual disturbance.   Respiratory:  Negative for cough and shortness of breath.    Cardiovascular:  Negative for chest pain.   Gastrointestinal:  Negative for abdominal pain, diarrhea, nausea and vomiting.   Neurological:  Negative for dizziness, light-headedness and  "headaches.   Hematological:  Negative for adenopathy.       Objective   /92 (BP Location: Left arm, Patient Position: Sitting, Cuff Size: Large)   Pulse (!) 54   Ht 4' 9\" (1.448 m)   Wt 58.1 kg (128 lb)   SpO2 97%   BMI 27.70 kg/m²      Physical Exam  Vitals and nursing note reviewed.   Constitutional:       General: She is not in acute distress.     Appearance: Normal appearance. She is well-developed. She is not ill-appearing, toxic-appearing or diaphoretic.   HENT:      Head: Normocephalic and atraumatic.      Right Ear: External ear normal.      Left Ear: External ear normal.      Nose: Nose normal.   Eyes:      Pupils: Pupils are equal, round, and reactive to light.   Cardiovascular:      Rate and Rhythm: Normal rate and regular rhythm.      Heart sounds: Normal heart sounds. No murmur heard.  Pulmonary:      Effort: Pulmonary effort is normal.      Breath sounds: Normal breath sounds.   Abdominal:      General: There is no distension.      Palpations: Abdomen is soft.      Tenderness: There is no abdominal tenderness. There is no guarding.   Psychiatric:         Mood and Affect: Mood is anxious. Mood is not depressed.         Thought Content: Thought content does not include suicidal ideation.       Administrative Statements   I have spent a total time of 30 minutes in caring for this patient on the day of the visit/encounter including Diagnostic results, Instructions for management, Impressions, Counseling / Coordination of care, Documenting in the medical record, Reviewing/placing orders in the medical record (including tests, medications, and/or procedures), and Obtaining or reviewing history  .  "

## 2025-05-04 PROBLEM — Z63.6 CAREGIVER STRESS: Status: ACTIVE | Noted: 2025-05-04

## 2025-05-04 PROBLEM — I10 PRIMARY HYPERTENSION: Status: ACTIVE | Noted: 2025-05-04

## 2025-05-04 NOTE — ASSESSMENT & PLAN NOTE
Most recent TSH is now euthyroid 0.6 currently off methimazole I did review the notes from endocrinology currently she is stable/asymptomatic

## 2025-05-04 NOTE — ASSESSMENT & PLAN NOTE
{I have counselled the pt to follow a healthy and balanced diet ,and recommend routine exercise.

## 2025-05-04 NOTE — ASSESSMENT & PLAN NOTE
I have reviewed endocrinology note she has been offered Prolia and Reclast but has not decided at this point weightbearing exercises calcium and vitamin D supplementation

## 2025-05-04 NOTE — ASSESSMENT & PLAN NOTE
Today we had a long discussion about her sister who lives in [place] and in a nursing home for what sounds to be frontal lobe dementia she is having significant memory loss along with behavioral issues.  Patient is planning to bring her back to the [facility] for nursing home care.  She is very concerned about her sister and that she does not have the level of care where she is at in [place] as she is planning to go to [place] and drive her home to [facility] and work on placement.  Today we did provide supportive listening and counseling for the patient.  At this point time she does not need a medication for her stress

## 2025-05-04 NOTE — ASSESSMENT & PLAN NOTE
Hypertension - controlled, I have counseled patient following healthy balance diet, I would like the patient reduce sodium, exercise routinely, I would like the patient continued the med current medical regiment and we will continue to monitor.  Patient reports me that she has not developed a schedule or take medicine I explained the importance of taking a blood pressure pill at this time every day and setting up a good schedule she should consider a reminder dk I have explained to her the importance to reduce the risk of cardiovascular disease including stroke she has agreed

## 2025-06-04 ENCOUNTER — HOSPITAL ENCOUNTER (OUTPATIENT)
Dept: RADIOLOGY | Age: 85
Discharge: HOME | End: 2025-06-04
Attending: INTERNAL MEDICINE
Payer: COMMERCIAL

## 2025-06-04 DIAGNOSIS — M81.0 OSTEOPOROSIS, UNSPECIFIED OSTEOPOROSIS TYPE, UNSPECIFIED PATHOLOGICAL FRACTURE PRESENCE: ICD-10-CM

## 2025-06-04 PROCEDURE — 77080 DXA BONE DENSITY AXIAL: CPT

## 2025-06-13 ENCOUNTER — OFFICE VISIT (OUTPATIENT)
Dept: ENDOCRINOLOGY | Facility: CLINIC | Age: 85
End: 2025-06-13
Payer: COMMERCIAL

## 2025-06-13 VITALS
HEART RATE: 53 BPM | SYSTOLIC BLOOD PRESSURE: 126 MMHG | HEIGHT: 57 IN | DIASTOLIC BLOOD PRESSURE: 84 MMHG | BODY MASS INDEX: 27.18 KG/M2 | WEIGHT: 126 LBS | TEMPERATURE: 98.5 F | OXYGEN SATURATION: 98 %

## 2025-06-13 DIAGNOSIS — E05.90 HYPERTHYROIDISM: Primary | ICD-10-CM

## 2025-06-13 DIAGNOSIS — M81.0 AGE-RELATED OSTEOPOROSIS WITHOUT CURRENT PATHOLOGICAL FRACTURE: ICD-10-CM

## 2025-06-13 DIAGNOSIS — E55.9 VITAMIN D DEFICIENCY: ICD-10-CM

## 2025-06-13 PROCEDURE — 99214 OFFICE O/P EST MOD 30 MIN: CPT | Performed by: INTERNAL MEDICINE

## 2025-06-13 NOTE — PROGRESS NOTES
"    Follow-up Patient Progress Note      CC: Graves disease     History of Present Illness:   82-year-old female with osteoporosis, vitamin D deficiency, DJD, diabetes, colonic polyps and recently diagnosed mild hyperthyroidism 2\" Graves disease c +ve TSI. Last visit was 6/13/24.     Since last visit, weight is same.  She is essentially asymptomatic.     She stopped methimazole 5/18/23.     No History of external radiation, recent Iodine loading or radiological diagnostic studies.   No difficulty with swallowing or breathing or change in voice.        1/30/22: NM uptake thyroid - normal.     3/8/2023 DEXA: T-scores of -2.0 LS, -2.0 LTH, -2.4 LFN, -2.8 right forearm.  Consistent with osteoporosis.  10-year FRAX score of 3.7% hip and 11% major osteoporotic fracture.     Family Hx of thyroid:nephew - graves. Mother - hypothyroidism. Sister - hypothyroidism.      Physical Exam:  Body mass index is 27.27 kg/m².  /84 (BP Location: Left arm, Patient Position: Sitting, Cuff Size: Standard)   Pulse (!) 53   Temp 98.5 °F (36.9 °C) (Tympanic)   Ht 4' 9\" (1.448 m)   Wt 57.2 kg (126 lb) Comment: reported by pt  SpO2 98%   BMI 27.27 kg/m²    Vitals:    06/13/25 1116   Weight: 57.2 kg (126 lb)        Physical Exam  Constitutional:       General: She is not in acute distress.     Appearance: She is well-developed.   HENT:      Head: Normocephalic and atraumatic.      Nose: Nose normal.     Eyes:      Conjunctiva/sclera: Conjunctivae normal.     Pulmonary:      Effort: Pulmonary effort is normal.   Abdominal:      General: There is no distension.     Musculoskeletal:      Cervical back: Normal range of motion and neck supple.     Skin:     Findings: No rash.      Comments: No icterus     Neurological:      Mental Status: She is alert and oriented to person, place, and time.         Labs:   Lab Results   Component Value Date    HGBA1C 5.8 (H) 09/29/2022       Lab Results   Component Value Date    [id number] 0.655 " 05/01/2025    P2EJBDM 0.9 06/12/2024    R9RPQKQ 9.21 06/20/2023       Lab Results   Component Value Date    CREATININE 0.68 05/01/2025    CREATININE 0.67 04/15/2024    CREATININE 0.54 (L) 09/29/2022    BUN 16 05/01/2025     07/24/2015    K 4.2 05/01/2025     05/01/2025    CO2 30 05/01/2025     eGFRcr   Date Value Ref Range Status   06/17/2022 92 >59 Final     eGFR   Date Value Ref Range Status   05/01/2025 79 ml/min/1.73sq m Final       Lab Results   Component Value Date    ALT 39 05/01/2025    AST 36 05/01/2025    GGT 78 11/03/2022    ALKPHOS 85 05/01/2025    BILITOT 0.56 07/24/2015       Lab Results   Component Value Date    CHOLESTEROL 199 05/01/2025    CHOLESTEROL 188 04/15/2024    CHOLESTEROL 156 09/29/2022     Lab Results   Component Value Date    HDL 93 05/01/2025    HDL 83 04/15/2024    HDL 80 09/29/2022     Lab Results   Component Value Date    TRIG 89 05/01/2025    TRIG 96 04/15/2024    TRIG 92 09/29/2022     Lab Results   Component Value Date    NONHDLC 91 08/06/2018         Assessment/Plan:    1. Hyperthyroidism  Assessment & Plan:  She remains euthyroid clinically and biochemically. TSH remains low normal.  We agreed to watch without methimazole.    Repeat TSH, fT4 and TSI.  Follow up in 6 months as needed either with myself or PCP.    Orders:  -     T4, free; Future  -     TSH, 3rd generation; Future  -     Thyroid stimulating immunoglobulin; Future  2. Age-related osteoporosis without current pathological fracture  Assessment & Plan:  She will benefit from prolia based on high frax score and need to preserve QoL.  She is able to do enough diet/exercise, calcium and vitamin D.    She is not completely convinced but willing to start prolia based on same recs by PCP and myself.    Follow up in 6 months.  Orders:  -     PTH, intact; Future  -     Phosphorus; Future  -     Comprehensive metabolic panel; Future  -     denosumab (PROLIA) 60 mg/mL; Inject 1 mL (60 mg total) under the skin every 6  (six) months  3. Vitamin D deficiency  -     Vitamin D 25 hydroxy; Future        I have spent a total time of  minutes on 06/13/25 in caring for this patient including greater than 50% of this time was spent in counseling/coordination of care as listed above.       Discussed with the patient and all questioned fully answered. She will contact me with concerns.    Gerry Yip

## 2025-06-13 NOTE — PATIENT INSTRUCTIONS
Patient Education     Denosumab (kvng BATEMAN gabby mab)   Brand Names: [place] Prolia; Xgeva   Brand Names: [place] Prolia; Xgeva   Warning   Prolia and Jubbonti:   This drug may lower blood calcium levels. If you already have low blood calcium, it may get worse. Low blood calcium must be treated before getting this drug.  People who have some types of kidney problems have a higher risk of very low calcium levels. This includes people who are on dialysis. Sometimes, very low calcium levels have needed treatment in a hospital and have been life-threatening or deadly. A certain health problem called chronic kidney disease-mineral bone disorder (CKD-MBD) may raise the risk of very low calcium levels. Your doctor may test you for CKD-MBD before starting this drug and while you take it.  What is this drug used for?   It is used to treat soft, brittle bones (osteoporosis).  It is used for bone growth.  It is used when treating some cancers.  It is used to treat bone loss in patients getting certain treatments for cancer.  It is used to treat high calcium levels in patients with cancer.  It may be given to you for other reasons. Talk with the doctor.  What do I need to tell my doctor BEFORE I take this drug?   If you are allergic to this drug; any part of this drug; or any other drugs, foods, or substances. Tell your doctor about the allergy and what signs you had.  If you have low calcium levels.  If you are using another drug that has the same drug in it.  If you are pregnant or may be pregnant. Some brands of this drug are not for use during pregnancy.  If you are breast-feeding or plan to breast-feed.  This is not a list of all drugs or health problems that interact with this drug.  Tell your doctor and pharmacist about all of your drugs (prescription or OTC, natural products, vitamins) and health problems. You must check to make sure that it is safe for you to take this drug with all of your drugs and health problems. Do not  start, stop, or change the dose of any drug without checking with your doctor.  What are some things I need to know or do while I take this drug?   All products:   Tell all of your health care providers that you take this drug. This includes your doctors, nurses, pharmacists, and dentists.  This drug may raise the chance of a broken leg. Talk with the doctor.  If treatment with this drug is stopped, skipped, or delayed, the chance of a broken bone is raised. This includes bones in the spine. The chance of having more than 1 broken bone in the spine is raised if you have ever had a broken bone in your spine. Do not stop, skip, or delay treatment with this drug without talking to your doctor.  Have your blood work and bone density checked as you have been told by your doctor.  Take calcium and vitamin D as you were told by your doctor.  Have a dental exam before starting this drug.  Take good care of your teeth. See a dentist often.  This drug may cause harm to an unborn baby. A pregnancy test will be done before you start this drug to show that you are NOT pregnant.  If you may become pregnant, you must use birth control while taking this drug and for some time after the last dose. Ask your doctor how long to use birth control. If you get pregnant, call your doctor right away.  Xgeva and Kaci:   Very low blood calcium levels have happened with this drug. Sometimes, this has been deadly. If you have questions, talk with the doctor.  High calcium levels have happened after this drug was stopped in people whose bones were still growing and people with giant cell bone tumor. Call your doctor right away if you have signs of high calcium levels like weakness, confusion, feeling tired, headache, upset stomach or throwing up, constipation, or bone pain.  Prolia and Jubbonti:   Very bad infections have been reported with use of this drug. If you have any infection, are taking antibiotics now or in the recent past, or have  many infections, talk with your doctor.  Rarely, a pancreas problem (pancreatitis) has happened with this drug. This has included 1 death. Call your doctor right away if you have signs of pancreatitis like very bad stomach pain, very bad back pain, or very bad upset stomach or throwing up.  High cholesterol has happened with this drug. If you have questions, talk with the doctor.  This drug is not approved for use in children. High calcium levels have been reported in some children, which sometimes needed to be treated in the hospital. Bone and teeth growth may also be affected. Talk with the doctor.  What are some side effects that I need to call my doctor about right away?   WARNING/CAUTION: Even though it may be rare, some people may have very bad and sometimes deadly side effects when taking a drug. Tell your doctor or get medical help right away if you have any of the following signs or symptoms that may be related to a very bad side effect:  All products:   Signs of an allergic reaction, like rash; hives; itching; red, swollen, blistered, or peeling skin with or without fever; wheezing; tightness in the chest or throat; trouble breathing, swallowing, or talking; unusual hoarseness; or swelling of the mouth, face, lips, tongue, or throat.  Signs of low calcium levels like muscle cramps or spasms, numbness and tingling, or seizures.  Confusion.  Mouth sores.  Swelling in the arms or legs.  Feeling very tired or weak.  Any new or strange groin, hip, or thigh pain.  Very bad bone, joint, or muscle pain.  Shortness of breath.  This drug may cause jawbone problems. The risk may be higher with longer use, cancer, dental problems, ill-fitting dentures, anemia, blood clotting problems, or infection. It may also be higher if you have dental work, chemo, radiation, or take other drugs that may cause jawbone problems. Many drugs can do this. Talk with your doctor if any of these apply to you, or if you have questions.  Call your doctor right away if you have jaw swelling or pain.  Xgeva and Wyost:   Signs of low phosphate levels like change in eyesight, feeling confused, mood changes, muscle pain or weakness, shortness of breath or other breathing problems, or trouble swallowing.  Severe dizziness or passing out.  Any unexplained bruising or bleeding.  Prolia and Jubbonti:   Signs of infection like fever, chills, very bad sore throat, ear or sinus pain, cough, more sputum or change in color of sputum, pain with passing urine, mouth sores, or wound that will not heal.  Signs of skin infection like oozing, heat, swelling, redness, or pain.  Signs of high or low blood pressure like very bad headache or dizziness, passing out, or change in eyesight.  Chest pain.  Abnormal heartbeat.  Small bumps or patches on your skin, dry skin, or if your skin feels like leather.  Bladder pain or pain when passing urine or change in how much urine is passed.  Passing urine more often.  What are some other side effects of this drug?   All drugs may cause side effects. However, many people have no side effects or only have minor side effects. Call your doctor or get medical help if any of these side effects or any other side effects bother you or do not go away:  All products:   Back, muscle, or joint pain.  Headache.  Signs of a common cold.  Pain in arms or legs.  Xgeva and Wyost:   Constipation, diarrhea, stomach pain, upset stomach, throwing up, or decreased appetite.  Feeling tired or weak.  Nose or throat irritation.  Tooth pain.  These are not all of the side effects that may occur. If you have questions about side effects, call your doctor. Call your doctor for medical advice about side effects.  You may report side effects to your national health agency.  You may report side effects to the FDA at 1-261.976.7227. You may also report side effects at https://www.fda.gov/medwatch.  How is this drug best taken?   Use this drug as ordered by your  doctor. Read all information given to you. Follow all instructions closely.  It is given as a shot into the fatty part of the skin.  What do I do if I miss a dose?   Call your doctor to find out what to do.  How do I store and/or throw out this drug?   If you need to store this drug at home, talk with your doctor, nurse, or pharmacist about how to store it.  General drug facts   If your symptoms or health problems do not get better or if they become worse, call your doctor.  Do not share your drugs with others and do not take anyone else's drugs.  Keep all drugs in a safe place. Keep all drugs out of the reach of children and pets.  Throw away unused or  drugs. Do not flush down a toilet or pour down a drain unless you are told to do so. Check with your pharmacist if you have questions about the best way to throw out drugs. There may be drug take-back programs in your area.  Some drugs may have another patient information leaflet. If you have any questions about this drug, please talk with your doctor, nurse, pharmacist, or other health care provider.  Some drugs may have another patient information leaflet. Check with your pharmacist. If you have any questions about this drug, please talk with your doctor, nurse, pharmacist, or other health care provider.  If you think there has been an overdose, call your poison control center or get medical care right away. Be ready to tell or show what was taken, how much, and when it happened.  Consumer Information Use and Disclaimer   This generalized information is a limited summary of diagnosis, treatment, and/or medication information. It is not meant to be comprehensive and should be used as a tool to help the user understand and/or assess potential diagnostic and treatment options. It does NOT include all information about conditions, treatments, medications, side effects, or risks that may apply to a specific patient. It is not intended to be medical advice or a  substitute for the medical advice, diagnosis, or treatment of a health care provider based on the health care provider's examination and assessment of a patient's specific and unique circumstances. Patients must speak with a health care provider for complete information about their health, medical questions, and treatment options, including any risks or benefits regarding use of medications. This information does not endorse any treatments or medications as safe, effective, or approved for treating a specific patient. [employer] and its affiliates disclaim any warranty or liability relating to this information or the use thereof. The use of this information is governed by the Terms of Use, available at https://www.[employer] Networksuwer.com/en/know/clinical-effectiveness-terms.  Last Reviewed Date   2024-03-14  Copyright   © 2024 [employer] and its affiliates and/or licensors. All rights reserved.

## 2025-06-13 NOTE — ASSESSMENT & PLAN NOTE
She remains euthyroid clinically and biochemically. TSH remains low normal.  We agreed to watch without methimazole.    Repeat TSH, fT4 and TSI.  Follow up in 6 months as needed either with myself or PCP.

## 2025-06-13 NOTE — ASSESSMENT & PLAN NOTE
She will benefit from prolia based on high frax score and need to preserve QoL.  She is able to do enough diet/exercise, calcium and vitamin D.    She is not completely convinced but willing to start prolia based on same recs by PCP and myself.    Follow up in 6 months.

## 2025-06-17 ENCOUNTER — TELEPHONE (OUTPATIENT)
Age: 85
End: 2025-06-17

## 2025-06-17 NOTE — TELEPHONE ENCOUNTER
Patient called stating she picked up her Prolia from the pharmacy and would like to schedule an appointment for her injection.  Please advise

## 2025-06-19 NOTE — TELEPHONE ENCOUNTER
Patient schedule for her first Prolia Inj 06-. Patient is aware she has to wait 15 mins after administration.

## 2025-06-23 ENCOUNTER — CLINICAL SUPPORT (OUTPATIENT)
Dept: ENDOCRINOLOGY | Facility: CLINIC | Age: 85
End: 2025-06-23
Payer: MEDICARE

## 2025-06-23 VITALS
HEART RATE: 53 BPM | WEIGHT: 126 LBS | OXYGEN SATURATION: 98 % | TEMPERATURE: 98.5 F | SYSTOLIC BLOOD PRESSURE: 110 MMHG | BODY MASS INDEX: 27.18 KG/M2 | DIASTOLIC BLOOD PRESSURE: 86 MMHG | HEIGHT: 57 IN

## 2025-06-23 DIAGNOSIS — M81.0 AGE-RELATED OSTEOPOROSIS WITHOUT CURRENT PATHOLOGICAL FRACTURE: Primary | ICD-10-CM

## 2025-06-23 PROCEDURE — 96372 THER/PROPH/DIAG INJ SC/IM: CPT | Performed by: INTERNAL MEDICINE

## 2025-06-27 NOTE — PROGRESS NOTES
"Assessment/Plan:    Megan Mckenzie came into the [facility] today 06/27/25 to receive  1st Prolia injection.      Verbal consent obtained.  Consent given by: patient    patient states patient has been medically healthy with no underlining concerns/complications.      Megan Mckenzie presents with N/A symptoms today.       All insturctions were reviewed with the patient.    If the patient should have any questions/concerns, advised patient to contacted [facility].       Subjective:     History provided by: patient    Patient ID: Megan Mckenzie is a 84 y.o. female      Objective:    Vitals:    06/23/25 1211   BP: 110/86   BP Location: Left arm   Patient Position: Sitting   Cuff Size: Standard   Pulse: (!) 53   Temp: 98.5 °F (36.9 °C)   TempSrc: Tympanic   SpO2: 98%   Weight: 57.2 kg (126 lb)   Height: 4' 9\" (1.448 m)       Patient tolerated the injection well without any complications.  Injection site/s left arm.  Medication was provided by patient supply.    Patient signed consent form yes   Patient signed ABN form yes (If no patient is not a medicare patient).   Patient waited 15 minutes after injection yes (This only applies to patient's receiving first time injection).       Last Visit: 6/13/2025  Next visit:12/15/2025     "

## 2025-07-25 ENCOUNTER — TELEPHONE (OUTPATIENT)
Age: 85
End: 2025-07-25

## 2025-07-25 NOTE — TELEPHONE ENCOUNTER
Patient called stating with her new insurance starting the beginning of this year they want her to pay $2,800 for the prolia through mail order and $1600 at the pharmacy. She is asking if we can order a generic like fosamax or actonel?

## 2025-08-03 DIAGNOSIS — M81.0 AGE-RELATED OSTEOPOROSIS WITHOUT CURRENT PATHOLOGICAL FRACTURE: Primary | ICD-10-CM

## 2025-08-03 RX ORDER — ALENDRONATE SODIUM 70 MG/1
70 TABLET ORAL
Qty: 12 TABLET | Refills: 1 | Status: SHIPPED | OUTPATIENT
Start: 2025-08-03